# Patient Record
Sex: FEMALE | Race: WHITE | Employment: UNEMPLOYED | URBAN - METROPOLITAN AREA
[De-identification: names, ages, dates, MRNs, and addresses within clinical notes are randomized per-mention and may not be internally consistent; named-entity substitution may affect disease eponyms.]

---

## 2017-04-05 PROBLEM — K21.9 GASTROESOPHAGEAL REFLUX DISEASE: Status: ACTIVE | Noted: 2017-04-05

## 2017-05-26 ENCOUNTER — HOSPITAL ENCOUNTER (OUTPATIENT)
Dept: PHYSICAL THERAPY | Age: 37
Discharge: HOME | End: 2017-05-26
Payer: COMMERCIAL

## 2017-05-26 DIAGNOSIS — M79.7 FIBROMYALGIA: ICD-10-CM

## 2017-05-26 PROCEDURE — 97162 PT EVAL MOD COMPLEX 30 MIN: CPT

## 2017-05-26 NOTE — PROGRESS NOTES
Ambulatory/Rehab Services H2 Model Falls Risk Assessment    Risk Factor Pts. ·   Confusion/Disorientation/Impulsivity  []    4 ·   Symptomatic Depression  []   2 ·   Altered Elimination  []   1 ·   Dizziness/Vertigo  []   1 ·   Gender (Male)  []   1 ·   Any administered antiepileptics (anticonvulsants):  []   2 ·   Any administered benzodiazepines:  []   1 ·   Visual Impairment (specify):  []   1 ·   Portable Oxygen Use  []   1 ·   Orthostatic ? BP  []   1 ·   History of Recent Falls (within 3 mos.)  []   5     Ability to Rise from Chair (choose one) Pts. ·   Ability to rise in a single movement  [x]   0 ·   Pushes up, successful in one attempt  []   1 ·   Multiple attempts, but successful  []   3 ·   Unable to rise without assistance  []   4   Total: (5 or greater = High Risk) 0     Falls Prevention Plan:   []                Physical Limitations to Exercise (specify):   []                Mobility Assistance Device (type):   []                Exercise/Equipment Adaptation (specify):    ©2010 [facility] of [employer] [address] [place] [address] Patent #[id number].  Federal Law prohibits the replication, distribution or use without written permission from [facility] of [address] [place] [address]  5/26/2017

## 2017-05-26 NOTE — PROGRESS NOTES
Dawood Florentino  : 1980 [facility] [place] [address] [address] [facility]  [phone]   [phone]        OUTPATIENT PHYSICAL THERAPY:Initial Assessment 2017    ICD-10: Treatment Diagnosis: fibromyalgia (M79.7)  Difficulty in walking, not elsewhere classified (R26.2)  Precautions/Allergies:   Excedrin p.m. [diphenhydramine-acetaminophen]; Excedrin [acetaminophen-caffeine]; and Pepto-bismol [bismuth subsalicylate]   Fall Risk Score: 0 (? 5 = High Risk)  MD Orders: Evaluate and treat MEDICAL/REFERRING DIAGNOSIS:  Fibromyalgia [M79.7]   DATE OF ONSET: 17  REFERRING PHYSICIAN: Demetria Escobedo, [address]: 17   Patient has attended 1 physical therapy session including initial evaluation. INITIAL ASSESSMENT:  Ms. Ilana Mcdonald presents with severe self reported fibromyalgia symptoms and self reported mild mood disturbances. She presents with severe self reported fatigue and decreased activity tolerance with functional mobility (6 minute walk test). She presents with postural deviations listed in detail below. She would benefit from skilled physical therapy in order to restore prior level of function and prevent future impairment. PROBLEM LIST (Impacting functional limitations):  1. Decreased Strength  2. Decreased ADL/Functional Activities  3. Decreased Transfer Abilities  4. Decreased Ambulation Ability/Technique  5. Decreased Balance  6. Increased Pain  7. Decreased Activity Tolerance  8. Decreased Pacing Skills  9. Decreased Work Simplification/Energy Conservation Techniques  10. Increased Fatigue  11. Decreased Flexibility/Joint Mobility  12. Decreased [place] with Home Exercise Program INTERVENTIONS PLANNED:  1. Balance Exercise  2. Bed Mobility  3. Cold  4. Family Education  5. Gait Training  6. Heat  7. Home Exercise Program (HEP)  8. Manual Therapy  9. Neuromuscular Re-education/Strengthening  10.  Range of Motion (ROM)  11. Therapeutic Activites  12. Therapeutic Exercise/Strengthening  13. Transfer Training  14. aquatic therapy   15. Postural training/ training   TREATMENT PLAN:  Effective Dates: 5/26/17 TO 8/26/17. Frequency/Duration: 2 times a week for 8 weeks  GOALS: (Goals have been discussed and agreed upon with patient.)  DISCHARGE GOALS: Time Frame: 8 weeks  Patient will be independent with home exercise program within 8 weeks in order to improve independence with management of patient's symptoms and/or functional deficits. Patient will improve their fibromyalgia impact scale score to less than 70 points within 8 weeks in order to improve activity tolerance and pain free independence with activities of daily living and functional mobility. Patient will improve their physical subscale score for modified fatigue impact scale to 27 points within 8 weeks in order to improve activity tolerance. Rehabilitation Potential For Stated Goals: Good  Regarding Brenda Peterson therapy, I certify that the treatment plan above will be carried out by a therapist or under their direction. Thank you for this referral,  Donna Bobo, PT     Referring Physician Signature: Guy Rivera, *              Date                    The information in this section was collected on 5/26/17 (except where otherwise noted). HISTORY:   History of Present Injury/Illness (Reason for Referral):  Megan Andre notes she is going to try physical therapy again \"even though I know it won't help. \" She notes she is looking to hire a  due to [employer]" with medical care and work related issues. She notes she has been on short term disability due to fatigue and increased pain. She plans to return to work 5/30/17. She notes fibromyalgia symptoms are worsening.    Past Medical History/Comorbidities: anemia, fibromyalgia, GERD, hypothyroidism, migraine, morbid obesity, oligomenorrhea, ovarian cyst, vitamin D deficiency, gastrectomy  Social History/Living Environment:     Roberta Navarro lives with her teenage daughter, no steps to enter house, one level home  Prior Level of Function/Work/Activity:  Roberta Navarro works full time - plans to return to work 5/30/17. Her job requirements include prolonged sitting, computer, and phone use  Personal Factors:          Sex:  female        Age:  40 y.o. Current Medications:  Trisprintec, lyrica, zomig, cymbalta, metformin, synthroid, iron, valtrex, vitamin D3, multivitamin   Date Last Reviewed:  5/26/2017   Number of Personal Factors/Comorbidities that affect the Plan of Care: 1-2: MODERATE COMPLEXITY   EXAMINATION:   Observation/Orthostatic Postural Assessment:  Assessed 5/26/17 Roberta Navarro sits with forward head and rounded shoulders which indicate tight anterior chest musculature, upper trapezius, and levator scapula and weak posterior scapula musculature and deep cervical flexors.  Roberta Navarro stands with left iliac crest superior to right, left PSIS superior to right, left ASIS inferior to right  Palpation:  Assessed 5/26/17 Roberta Navarro notes tenderness to palpate throughout body during assessment  ROM: Assessed  5/26/17  Selective functional movement assessment  Cervical flexion 75%  Cervical extension 90%  Cervical rotation/sidebend bilaterally 75%  appleys scratch test external rotation T4 - feels good  appleys scratch test internal rotation T7 - pain left anterior shoulder and upper arm   Multisegmental flexion 75% - pain right low back region  Multisegmental extension 75% - doesn't hurt  Multisegmental rotation 75% - pain with right rotation  Single leg stance - at least 3 seconds bilaterally   Strength: Assessed  5/26/17   4/5 all major muscle groups bilateral upper and lower extremities   Special Tests: Assessed 5/26/17  Slump test - negative bilaterally  Straight leg raise- negative bilaterally, tight hamstrings bilaterally   Neurological Screen: Assessed 5/26/17  Myotomes: strong and symmetrical bilateral upper and lower extremities   Dermatomes: tingling noted bilateral hands and feet  Functional Mobility:  Assessed 5/26/17 Areta Clarity notes she requires assistance with carrying groceries, she notes she ambulates with straight cane at times due to increased pain, she notes difficulty completing house chores without significant rest between tasks  Balance:  Assessed 5/26/17 see single leg stance above  Mental Status:  Assessed 5/26/17  Alert and oriented x 4   Body Structures Involved:  1. Nerves  2. Joints  3. Muscles  4. Ligaments Body Functions Affected:  1. Sensory/Pain  2. Neuromusculoskeletal  3. Movement Related  4. Skin Related Activities and Participation Affected:  1. General Tasks and Demands  2. Mobility  3. Self Care  4. Domestic Life  5. Interpersonal Interactions and Relationships  6.  Community, Social and [place]   Number of elements that affect the Plan of Care: 3: MODERATE COMPLEXITY   CLINICAL PRESENTATION:   Presentation: Evolving clinical presentation with changing clinical characteristics: MODERATE COMPLEXITY   CLINICAL DECISION MAKING:   Objective Measures: Assessed 5/26/17    Tool Used: Fibromyalgia Impact Questionnaire  Score:  Initial: 86.73    Interpretation of Score: <40 = Mild Fibromyalgia Syndrome   40-60 = Moderate Fibromyalgia Syndrome   >60-70 = Severe Fibromyalgia Syndrome  Score 0 1-18 19-37 38-57 58-77 78-96 97   Modifier CH CI CJ CK CL CM CN     Tool Used: Thompson Depression Index   Score:  Initial: 13    Interpretation of Score: 0-10 = ups and downs considered normal   11-16= mild mood disturbance   17-20= borderline clinical depression   21-30= moderate depression   31-40= severe depression   Over 40= extreme depression     Tool Used: Modified Fatigue Impact Scale  Score:  Initial: Total score: 68/84  Physical subscale score: 31  Cognitive subscale score: 29  Psychosocial subscale score: 8      Tool Used: 6-MINUTE WALK TEST  Score:  Initial: 420 feet    Interpretation of Score: Normal range varies but is approximately 4347-0106 Feet  Medical Necessity:   · Patient is expected to demonstrate progress in strength, coordination and functional technique to increase independence with pain free functional mobility and activities of daily living. · Patient demonstrates good rehab potential due to higher previous functional level. Reason for Services/Other Comments:  · Patient continues to require skilled intervention due to increased pain with functional mobility and decreased activity tolerance. Use of outcome tool(s) and clinical judgement create a POC that gives a: Questionable prediction of patient's progress: MODERATE COMPLEXITY            TREATMENT:   (In addition to Assessment/Re-Assessment sessions the following treatments were rendered)  Pre-treatment Symptoms/Complaints:  Bernardo Tsai notes 4/10 pain whole body - achy constant pain  Pain: Initial:   Pain Intensity 1: 4  Pain Location 1:  (whole body)      Evaluation only this date    Treatment/Session Assessment:    Response to Treatment:  Bernardo Tsai notes increased pain in low back and legs after 6 minute walk test.   Pain: Post Treatment Session: 6/10  · Compliance with Program/Exercises: Will assess as treatment progresses. · Recommendations/Intent for next treatment session: \"Next visit will focus on advancements to more challenging activities\".   Total Treatment Duration:  PT Patient Time In/Time Out  Time In: 1345  Time Out: ENRIQUE Huynh

## 2017-05-30 ENCOUNTER — APPOINTMENT (OUTPATIENT)
Dept: PHYSICAL THERAPY | Age: 37
End: 2017-05-30
Payer: COMMERCIAL

## 2017-06-05 ENCOUNTER — HOSPITAL ENCOUNTER (OUTPATIENT)
Dept: PHYSICAL THERAPY | Age: 37
Discharge: HOME | End: 2017-06-05
Payer: COMMERCIAL

## 2017-06-05 PROCEDURE — 97110 THERAPEUTIC EXERCISES: CPT

## 2017-06-05 NOTE — PROGRESS NOTES
Jluis Lebron  : 1980 [facility] at [address], [facility] [address] [facility]  [phone]   [phone]        OUTPATIENT PHYSICAL THERAPY:Daily Note f 2017    ICD-10: Treatment Diagnosis: fibromyalgia (M79.7)  Difficulty in walking, not elsewhere classified (R26.2)  Precautions/Allergies:   Excedrin p.m. [diphenhydramine-acetaminophen]; Excedrin [acetaminophen-caffeine]; and Pepto-bismol [bismuth subsalicylate]   Fall Risk Score: 0 (? 5 = High Risk)  MD Orders: Evaluate and treat MEDICAL/REFERRING DIAGNOSIS:  Fibromyalgia [M79.7]   DATE OF ONSET: 17  REFERRING PHYSICIAN: Vonda Mata, [address]: 17   Patient has attended 1 physical therapy session including initial evaluation. INITIAL ASSESSMENT:  Ms. Dipak Ryan presents with severe self reported fibromyalgia symptoms and self reported mild mood disturbances. She presents with severe self reported fatigue and decreased activity tolerance with functional mobility (6 minute walk test). She presents with postural deviations listed in detail below. She would benefit from skilled physical therapy in order to restore prior level of function and prevent future impairment. PROBLEM LIST (Impacting functional limitations):  1. Decreased Strength  2. Decreased ADL/Functional Activities  3. Decreased Transfer Abilities  4. Decreased Ambulation Ability/Technique  5. Decreased Balance  6. Increased Pain  7. Decreased Activity Tolerance  8. Decreased Pacing Skills  9. Decreased Work Simplification/Energy Conservation Techniques  10. Increased Fatigue  11. Decreased Flexibility/Joint Mobility  12. Decreased [place] with Home Exercise Program INTERVENTIONS PLANNED:  1. Balance Exercise  2. Bed Mobility  3. Cold  4. Family Education  5. Gait Training  6. Heat  7. Home Exercise Program (HEP)  8. Manual Therapy  9. Neuromuscular Re-education/Strengthening  10.  Range of Motion (ROM)  11. Therapeutic Activites  12. Therapeutic Exercise/Strengthening  13. Transfer Training  14. aquatic therapy   15. Postural training/ training   TREATMENT PLAN:  Effective Dates: 5/26/17 TO 8/26/17. Frequency/Duration: 2 times a week for 8 weeks  GOALS: (Goals have been discussed and agreed upon with patient.)  DISCHARGE GOALS: Time Frame: 8 weeks  Patient will be independent with home exercise program within 8 weeks in order to improve independence with management of patient's symptoms and/or functional deficits. Patient will improve their fibromyalgia impact scale score to less than 70 points within 8 weeks in order to improve activity tolerance and pain free independence with activities of daily living and functional mobility. Patient will improve their physical subscale score for modified fatigue impact scale to 27 points within 8 weeks in order to improve activity tolerance. Rehabilitation Potential For Stated Goals: Good  Regarding Enedelia Dixon therapy, I certify that the treatment plan above will be carried out by a therapist or under their direction. Thank you for this referral,  Savi Womack PTA     Referring Physician Signature: Romi Baldwin, *              Date                    The information in this section was collected on 5/26/17 (except where otherwise noted). HISTORY:   History of Present Injury/Illness (Reason for Referral):  Kim Stark notes she is going to try physical therapy again \"even though I know it won't help. \" She notes she is looking to hire a  due to [employer]" with medical care and work related issues. She notes she has been on short term disability due to fatigue and increased pain. She plans to return to work 5/30/17. She notes fibromyalgia symptoms are worsening.    Past Medical History/Comorbidities: anemia, fibromyalgia, GERD, hypothyroidism, migraine, morbid obesity, oligomenorrhea, ovarian cyst, vitamin D deficiency, gastrectomy  Social History/Living Environment:     Megan Andre lives with her teenage daughter, no steps to enter house, one level home  Prior Level of Function/Work/Activity:  Megan Andre works full time - plans to return to work 5/30/17. Her job requirements include prolonged sitting, computer, and phone use  Personal Factors:          Sex:  female        Age:  40 y.o. Current Medications:  Trisprintec, lyrica, zomig, cymbalta, metformin, synthroid, iron, valtrex, vitamin D3, multivitamin   Date Last Reviewed:  6/5/2017   Number of Personal Factors/Comorbidities that affect the Plan of Care: 1-2: MODERATE COMPLEXITY   EXAMINATION:   Observation/Orthostatic Postural Assessment:  Assessed 5/26/17 Megan Andre sits with forward head and rounded shoulders which indicate tight anterior chest musculature, upper trapezius, and levator scapula and weak posterior scapula musculature and deep cervical flexors.  Megan Andre stands with left iliac crest superior to right, left PSIS superior to right, left ASIS inferior to right  Palpation:  Assessed 5/26/17 Megan Andre notes tenderness to palpate throughout body during assessment  ROM: Assessed  5/26/17  Selective functional movement assessment  Cervical flexion 75%  Cervical extension 90%  Cervical rotation/sidebend bilaterally 75%  appleys scratch test external rotation T4 - feels good  appleys scratch test internal rotation T7 - pain left anterior shoulder and upper arm   Multisegmental flexion 75% - pain right low back region  Multisegmental extension 75% - doesn't hurt  Multisegmental rotation 75% - pain with right rotation  Single leg stance - at least 3 seconds bilaterally   Strength: Assessed  5/26/17   4/5 all major muscle groups bilateral upper and lower extremities   Special Tests: Assessed 5/26/17  Slump test - negative bilaterally  Straight leg raise- negative bilaterally, tight hamstrings bilaterally   Neurological Screen: Assessed 5/26/17  Myotomes: strong and symmetrical bilateral upper and lower extremities   Dermatomes: tingling noted bilateral hands and feet  Functional Mobility:  Assessed 5/26/17 Kiki Tamayo notes she requires assistance with carrying groceries, she notes she ambulates with straight cane at times due to increased pain, she notes difficulty completing house chores without significant rest between tasks  Balance:  Assessed 5/26/17 see single leg stance above  Mental Status:  Assessed 5/26/17  Alert and oriented x 4   Body Structures Involved:  1. Nerves  2. Joints  3. Muscles  4. Ligaments Body Functions Affected:  1. Sensory/Pain  2. Neuromusculoskeletal  3. Movement Related  4. Skin Related Activities and Participation Affected:  1. General Tasks and Demands  2. Mobility  3. Self Care  4. Domestic Life  5. Interpersonal Interactions and Relationships  6.  Community, Social and [place]   Number of elements that affect the Plan of Care: 3: MODERATE COMPLEXITY   CLINICAL PRESENTATION:   Presentation: Evolving clinical presentation with changing clinical characteristics: MODERATE COMPLEXITY   CLINICAL DECISION MAKING:   Objective Measures: Assessed 5/26/17    Tool Used: Fibromyalgia Impact Questionnaire  Score:  Initial: 86.73    Interpretation of Score: <40 = Mild Fibromyalgia Syndrome   40-60 = Moderate Fibromyalgia Syndrome   >60-70 = Severe Fibromyalgia Syndrome  Score 0 1-18 19-37 38-57 58-77 78-96 97   Modifier CH CI CJ CK CL CM CN     Tool Used: Thompson Depression Index   Score:  Initial: 13    Interpretation of Score: 0-10 = ups and downs considered normal   11-16= mild mood disturbance   17-20= borderline clinical depression   21-30= moderate depression   31-40= severe depression   Over 40= extreme depression     Tool Used: Modified Fatigue Impact Scale  Score:  Initial: Total score: 68/84  Physical subscale score: 31  Cognitive subscale score: 29  Psychosocial subscale score: 8      Tool Used: 6-MINUTE WALK TEST  Score:  Initial: 420 feet    Interpretation of Score: Normal range varies but is approximately 2571-0302 Feet  Medical Necessity:   · Patient is expected to demonstrate progress in strength, coordination and functional technique to increase independence with pain free functional mobility and activities of daily living. · Patient demonstrates good rehab potential due to higher previous functional level. Reason for Services/Other Comments:  · Patient continues to require skilled intervention due to increased pain with functional mobility and decreased activity tolerance. Use of outcome tool(s) and clinical judgement create a POC that gives a: Questionable prediction of patient's progress: MODERATE COMPLEXITY            TREATMENT:   (In addition to Assessment/Re-Assessment sessions the following treatments were rendered)  Pre-treatment Symptoms/Complaints:  Roberta Navarro notes pain was 4.5/10. She reports she passed out Saturday afternoon, and took it easy on Sunday. .  Pain: Initial: 4.5/10           THERAPEUTIC EXERCISE: (45 minutes):  Exercises per grid below to improve mobility, strength and balance. Date:  6-5-17 Date:   Date:     Activity/Exercise Parameters Parameters Parameters   Nustep  Level 2  10 minutes     Supine - hamstring stretch With strap   3 x 30 sec B     Piriformis stretch  3 x 30 sec B     Pelvic tilt  10 x 5 sec      Lower trunk rotation  5 x 10 sec B     Knee to chest 3 x 30 sec                   Treatment/Session Assessment:  Patient was instructed and given handouts for the above exercises for a HEP. She did require verbal and tactile cues to correct a few positions. She had just a slight increase with pain upon departure. She did report feeling tired, but no fatigue noted. Response to Treatment:  Roberta Navarro seems open with treatment today. No difficulties noted. Pain: Post Treatment Session: 5.5/10  · Compliance with Program/Exercises:  Will assess as treatment progresses. · Recommendations/Intent for next treatment session: \"Next visit will focus on advancements to more challenging activities\".   Total Treatment Duration: 45 minutes   PT Patient Time In/Time Out  Time In: 3245  Time Out: [address], PTA

## 2017-06-08 ENCOUNTER — APPOINTMENT (OUTPATIENT)
Dept: PHYSICAL THERAPY | Age: 37
End: 2017-06-08
Payer: COMMERCIAL

## 2017-06-08 ENCOUNTER — HOSPITAL ENCOUNTER (OUTPATIENT)
Dept: PHYSICAL THERAPY | Age: 37
Discharge: HOME | End: 2017-06-08
Payer: COMMERCIAL

## 2017-06-08 PROCEDURE — 97113 AQUATIC THERAPY/EXERCISES: CPT

## 2017-06-08 NOTE — PROGRESS NOTES
James Long  : 1980 [facility] at [address], [facility]  [phone]   [id number]        OUTPATIENT PHYSICAL THERAPY:Daily Note and Aquatic Note 2017    ICD-10: Treatment Diagnosis: fibromyalgia (M79.7)  Difficulty in walking, not elsewhere classified (R26.2)  Precautions/Allergies:   Excedrin p.m. [diphenhydramine-acetaminophen]; Excedrin [acetaminophen-caffeine]; and Pepto-bismol [bismuth subsalicylate]   Fall Risk Score: 0 (? 5 = High Risk)  MD Orders: Evaluate and treat MEDICAL/REFERRING DIAGNOSIS:  Fibromyalgia [M79.7]   DATE OF ONSET: 17  REFERRING PHYSICIAN: Christiano Chavira, [address]: 17   Patient has attended 1 physical therapy session including initial evaluation. INITIAL ASSESSMENT:  Ms. Latisha Mehta presents with severe self reported fibromyalgia symptoms and self reported mild mood disturbances. She presents with severe self reported fatigue and decreased activity tolerance with functional mobility (6 minute walk test). She presents with postural deviations listed in detail below. She would benefit from skilled physical therapy in order to restore prior level of function and prevent future impairment. PROBLEM LIST (Impacting functional limitations):  1. Decreased Strength  2. Decreased ADL/Functional Activities  3. Decreased Transfer Abilities  4. Decreased Ambulation Ability/Technique  5. Decreased Balance  6. Increased Pain  7. Decreased Activity Tolerance  8. Decreased Pacing Skills  9. Decreased Work Simplification/Energy Conservation Techniques  10. Increased Fatigue  11. Decreased Flexibility/Joint Mobility  12. Decreased [place] with Home Exercise Program INTERVENTIONS PLANNED:  1. Balance Exercise  2. Bed Mobility  3. Cold  4. Family Education  5. Gait Training  6. Heat  7. Home Exercise Program (HEP)  8. Manual Therapy  9. Neuromuscular Re-education/Strengthening  10.  Range of Motion (ROM)  11. Therapeutic Activites  12. Therapeutic Exercise/Strengthening  13. Transfer Training  14. aquatic therapy   15. Postural training/ training   TREATMENT PLAN:  Effective Dates: 5/26/17 TO 8/26/17. Frequency/Duration: 2 times a week for 8 weeks  GOALS: (Goals have been discussed and agreed upon with patient.)  DISCHARGE GOALS: Time Frame: 8 weeks  Patient will be independent with home exercise program within 8 weeks in order to improve independence with management of patient's symptoms and/or functional deficits. Patient will improve their fibromyalgia impact scale score to less than 70 points within 8 weeks in order to improve activity tolerance and pain free independence with activities of daily living and functional mobility. Patient will improve their physical subscale score for modified fatigue impact scale to 27 points within 8 weeks in order to improve activity tolerance. Rehabilitation Potential For Stated Goals: Good  Regarding Man [facility]ings[place] therapy, I certify that the treatment plan above will be carried out by a therapist or under their direction. Thank you for this referral,  Aarti Almanza PTA     Referring Physician Signature: Saeid Santoyo, *              Date                    The information in this section was collected on 5/26/17 (except where otherwise noted). HISTORY:   History of Present Injury/Illness (Reason for Referral):  Gracia Ramírez notes she is going to try physical therapy again \"even though I know it won't help. \" She notes she is looking to hire a  due to [employer]" with medical care and work related issues. She notes she has been on short term disability due to fatigue and increased pain. She plans to return to work 5/30/17. She notes fibromyalgia symptoms are worsening.    Past Medical History/Comorbidities: anemia, fibromyalgia, GERD, hypothyroidism, migraine, morbid obesity, oligomenorrhea, ovarian cyst, vitamin D deficiency, gastrectomy  Social History/Living Environment:     Bulmaro Rahman lives with her teenage daughter, no steps to enter house, one level home  Prior Level of Function/Work/Activity:  Bulmaro Rahman works full time - plans to return to work 5/30/17. Her job requirements include prolonged sitting, computer, and phone use  Personal Factors:          Sex:  female        Age:  40 y.o. Current Medications:  Trisprintec, lyrica, zomig, cymbalta, metformin, synthroid, iron, valtrex, vitamin D3, multivitamin   Date Last Reviewed:  6/8/2017   Number of Personal Factors/Comorbidities that affect the Plan of Care: 1-2: MODERATE COMPLEXITY   EXAMINATION:   Observation/Orthostatic Postural Assessment:  Assessed 5/26/17 Bulmaro Rahman sits with forward head and rounded shoulders which indicate tight anterior chest musculature, upper trapezius, and levator scapula and weak posterior scapula musculature and deep cervical flexors.  Bulmaro Rahman stands with left iliac crest superior to right, left PSIS superior to right, left ASIS inferior to right  Palpation:  Assessed 5/26/17 Bulmaro Rahman notes tenderness to palpate throughout body during assessment  ROM: Assessed  5/26/17  Selective functional movement assessment  Cervical flexion 75%  Cervical extension 90%  Cervical rotation/sidebend bilaterally 75%  appleys scratch test external rotation T4 - feels good  appleys scratch test internal rotation T7 - pain left anterior shoulder and upper arm   Multisegmental flexion 75% - pain right low back region  Multisegmental extension 75% - doesn't hurt  Multisegmental rotation 75% - pain with right rotation  Single leg stance - at least 3 seconds bilaterally   Strength: Assessed  5/26/17   4/5 all major muscle groups bilateral upper and lower extremities   Special Tests: Assessed 5/26/17  Slump test - negative bilaterally  Straight leg raise- negative bilaterally, tight hamstrings bilaterally   Neurological Screen: Assessed 5/26/17  Myotomes: strong and symmetrical bilateral upper and lower extremities   Dermatomes: tingling noted bilateral hands and feet  Functional Mobility:  Assessed 5/26/17 Traci Moreno notes she requires assistance with carrying groceries, she notes she ambulates with straight cane at times due to increased pain, she notes difficulty completing house chores without significant rest between tasks  Balance:  Assessed 5/26/17 see single leg stance above  Mental Status:  Assessed 5/26/17  Alert and oriented x 4   Body Structures Involved:  1. Nerves  2. Joints  3. Muscles  4. Ligaments Body Functions Affected:  1. Sensory/Pain  2. Neuromusculoskeletal  3. Movement Related  4. Skin Related Activities and Participation Affected:  1. General Tasks and Demands  2. Mobility  3. Self Care  4. Domestic Life  5. Interpersonal Interactions and Relationships  6.  Community, Social and [place]   Number of elements that affect the Plan of Care: 3: MODERATE COMPLEXITY   CLINICAL PRESENTATION:   Presentation: Evolving clinical presentation with changing clinical characteristics: MODERATE COMPLEXITY   CLINICAL DECISION MAKING:   Objective Measures: Assessed 5/26/17    Tool Used: Fibromyalgia Impact Questionnaire  Score:  Initial: 86.73    Interpretation of Score: <40 = Mild Fibromyalgia Syndrome   40-60 = Moderate Fibromyalgia Syndrome   >60-70 = Severe Fibromyalgia Syndrome  Score 0 1-18 19-37 38-57 58-77 78-96 97   Modifier CH CI CJ CK CL CM CN     Tool Used: Thompson Depression Index   Score:  Initial: 13    Interpretation of Score: 0-10 = ups and downs considered normal   11-16= mild mood disturbance   17-20= borderline clinical depression   21-30= moderate depression   31-40= severe depression   Over 40= extreme depression     Tool Used: Modified Fatigue Impact Scale  Score:  Initial: Total score: 68/84  Physical subscale score: 31  Cognitive subscale score: 29  Psychosocial subscale score: 8      Tool Used: 6-MINUTE WALK TEST  Score:  Initial: 420 feet    Interpretation of Score: Normal range varies but is approximately 4297-7158 Feet  Medical Necessity:   · Patient is expected to demonstrate progress in strength, coordination and functional technique to increase independence with pain free functional mobility and activities of daily living. · Patient demonstrates good rehab potential due to higher previous functional level. Reason for Services/Other Comments:  · Patient continues to require skilled intervention due to increased pain with functional mobility and decreased activity tolerance. Use of outcome tool(s) and clinical judgement create a POC that gives a: Questionable prediction of patient's progress: MODERATE COMPLEXITY            TREATMENT:     Aquatic Therapy (45 minutes): Aquatic treatment performed per flow grid for Decreased muscle strength, Decreased endurance and Decreased activity endurance.       Aquatic Exercise Log       Date  6-8-17 Date   Date   Date   Date     Activity/ Exercise Parameters Parameters Parameters Parameters Parameters   Walking forward 2 laps       Walking backward 2 laps       Walking sideways 2 laps         Marching 2 laps         Goose Step 2 laps         Tip toes          Heels          Lunges        Side step squats        LE Exercises noodle         Hip Flex/Ext Marching x 15 B         Hip Abd/Add X 15 B         Hip IR/ER          Calf raises          Knee Flex          Squats          Leg Circles Deeper water x 10 each way each leg         Step Ups        UE Exercises noodle         Squeeze In X 15 B         Push Down X 15 B         Pull Down X 15 B         Bicep/Tricep        Rows/Press outs         Chi Positions          Trunk Rotation        Deep H2O/ Noodles noodle         Stabilization          Arms only Straddle - 2 laps         Legs only Straddle - 2 laps  Both - 2 laps       BarkBox X 20          Scissors X 20          Crab walk 2 laps       Lower abdominal work  Knees to chest x 10          Cardio          Jogging        Lap   Swimming          Stretches [x]  In [place]  []  Whirlpool* []  In [place]  []  Whirlpool* []  In [place]  []  Whirlpool* []  In Pool  []  Whirlpool* []  In Pool  []  Whirlpool*     Hamstrings X 3 B self         Heelcords X 3 B self         Piriformis X 3 B self         Neck                  * For increased soft tissue extensibility a warm water (over 100°F) environment was utilized. Supervision/monitoring was provided at all times. Treatment/Session Assessment:  Patient did well with her first aquatic session. She reports she is trying hard. Patient reports feeling tired, but no fatigue noted. Canary Malady Response to Treatment:  Elia Tavera seems open with treatment today. No difficulties noted. Pain: Post Treatment Session: 5.5/10  · Compliance with Program/Exercises: Will assess as treatment progresses. · Recommendations/Intent for next treatment session: \"Next visit will focus on advancements to more challenging activities\".   Total Treatment Duration: 45 minutes   PT Patient Time In/Time Out  Time In: 1315  Time Out: 179 [facility]

## 2017-06-12 ENCOUNTER — HOSPITAL ENCOUNTER (OUTPATIENT)
Dept: PHYSICAL THERAPY | Age: 37
Discharge: HOME | End: 2017-06-12
Payer: COMMERCIAL

## 2017-06-12 PROCEDURE — 97110 THERAPEUTIC EXERCISES: CPT

## 2017-06-12 NOTE — PROGRESS NOTES
Roberta Navarro  : 1980 [facility] Center at [address], [facility]  [phone]   [phone]        OUTPATIENT PHYSICAL THERAPY:Daily Note f 2017    ICD-10: Treatment Diagnosis: fibromyalgia (M79.7)  Difficulty in walking, not elsewhere classified (R26.2)  Precautions/Allergies:   Excedrin p.m. [diphenhydramine-acetaminophen]; Excedrin [acetaminophen-caffeine]; and Pepto-bismol [bismuth subsalicylate]   Fall Risk Score: 0 (? 5 = High Risk)  MD Orders: Evaluate and treat MEDICAL/REFERRING DIAGNOSIS:  Fibromyalgia [M79.7]   DATE OF ONSET: 17  REFERRING PHYSICIAN: Jay Williamson, [address]: 17   Patient has attended 1 physical therapy session including initial evaluation. INITIAL ASSESSMENT:  Ms. Velvet Olivarez presents with severe self reported fibromyalgia symptoms and self reported mild mood disturbances. She presents with severe self reported fatigue and decreased activity tolerance with functional mobility (6 minute walk test). She presents with postural deviations listed in detail below. She would benefit from skilled physical therapy in order to restore prior level of function and prevent future impairment. PROBLEM LIST (Impacting functional limitations):  1. Decreased Strength  2. Decreased ADL/Functional Activities  3. Decreased Transfer Abilities  4. Decreased Ambulation Ability/Technique  5. Decreased Balance  6. Increased Pain  7. Decreased Activity Tolerance  8. Decreased Pacing Skills  9. Decreased Work Simplification/Energy Conservation Techniques  10. Increased Fatigue  11. Decreased Flexibility/Joint Mobility  12. Decreased [place] with Home Exercise Program INTERVENTIONS PLANNED:  1. Balance Exercise  2. Bed Mobility  3. Cold  4. Family Education  5. Gait Training  6. Heat  7. Home Exercise Program (HEP)  8. Manual Therapy  9. Neuromuscular Re-education/Strengthening  10.  Range of Motion (ROM)  11. Therapeutic Activites  12. Therapeutic Exercise/Strengthening  13. Transfer Training  14. aquatic therapy   15. Postural training/ training   TREATMENT PLAN:  Effective Dates: 5/26/17 TO 8/26/17. Frequency/Duration: 2 times a week for 8 weeks  GOALS: (Goals have been discussed and agreed upon with patient.)  DISCHARGE GOALS: Time Frame: 8 weeks  Patient will be independent with home exercise program within 8 weeks in order to improve independence with management of patient's symptoms and/or functional deficits. Patient will improve their fibromyalgia impact scale score to less than 70 points within 8 weeks in order to improve activity tolerance and pain free independence with activities of daily living and functional mobility. Patient will improve their physical subscale score for modified fatigue impact scale to 27 points within 8 weeks in order to improve activity tolerance. Rehabilitation Potential For Stated Goals: Good  Regarding Shayla Christopher therapy, I certify that the treatment plan above will be carried out by a therapist or under their direction. Thank you for this referral,  Lexi Mohr PTA     Referring Physician Signature: Rajeev Arguello, *              Date                    The information in this section was collected on 5/26/17 (except where otherwise noted). HISTORY:   History of Present Injury/Illness (Reason for Referral):  Traci Moreno notes she is going to try physical therapy again \"even though I know it won't help. \" She notes she is looking to hire a  due to [employer]" with medical care and work related issues. She notes she has been on short term disability due to fatigue and increased pain. She plans to return to work 5/30/17. She notes fibromyalgia symptoms are worsening.    Past Medical History/Comorbidities: anemia, fibromyalgia, GERD, hypothyroidism, migraine, morbid obesity, oligomenorrhea, ovarian cyst, vitamin D deficiency, gastrectomy  Social History/Living Environment:     Marlyn Tang lives with her teenage daughter, no steps to enter house, one level home  Prior Level of Function/Work/Activity:  Marlyn Tang works full time - plans to return to work 5/30/17. Her job requirements include prolonged sitting, computer, and phone use  Personal Factors:          Sex:  female        Age:  40 y.o. Current Medications:  Trisprintec, lyrica, zomig, cymbalta, metformin, synthroid, iron, valtrex, vitamin D3, multivitamin   Date Last Reviewed:  6/12/2017   Number of Personal Factors/Comorbidities that affect the Plan of Care: 1-2: MODERATE COMPLEXITY   EXAMINATION:   Observation/Orthostatic Postural Assessment:  Assessed 5/26/17 Marlyn Tang sits with forward head and rounded shoulders which indicate tight anterior chest musculature, upper trapezius, and levator scapula and weak posterior scapula musculature and deep cervical flexors.  Marlyn Tang stands with left iliac crest superior to right, left PSIS superior to right, left ASIS inferior to right  Palpation:  Assessed 5/26/17 Marlyn Tang notes tenderness to palpate throughout body during assessment  ROM: Assessed  5/26/17  Selective functional movement assessment  Cervical flexion 75%  Cervical extension 90%  Cervical rotation/sidebend bilaterally 75%  appleys scratch test external rotation T4 - feels good  appleys scratch test internal rotation T7 - pain left anterior shoulder and upper arm   Multisegmental flexion 75% - pain right low back region  Multisegmental extension 75% - doesn't hurt  Multisegmental rotation 75% - pain with right rotation  Single leg stance - at least 3 seconds bilaterally   Strength: Assessed  5/26/17   4/5 all major muscle groups bilateral upper and lower extremities   Special Tests: Assessed 5/26/17  Slump test - negative bilaterally  Straight leg raise- negative bilaterally, tight hamstrings bilaterally   Neurological Screen: Assessed 5/26/17  Myotomes: strong and symmetrical bilateral upper and lower extremities   Dermatomes: tingling noted bilateral hands and feet  Functional Mobility:  Assessed 5/26/17 Tiesha Wilcox notes she requires assistance with carrying groceries, she notes she ambulates with straight cane at times due to increased pain, she notes difficulty completing house chores without significant rest between tasks  Balance:  Assessed 5/26/17 see single leg stance above  Mental Status:  Assessed 5/26/17  Alert and oriented x 4   Body Structures Involved:  1. Nerves  2. Joints  3. Muscles  4. Ligaments Body Functions Affected:  1. Sensory/Pain  2. Neuromusculoskeletal  3. Movement Related  4. Skin Related Activities and Participation Affected:  1. General Tasks and Demands  2. Mobility  3. Self Care  4. Domestic Life  5. Interpersonal Interactions and Relationships  6.  Community, Social and [place]   Number of elements that affect the Plan of Care: 3: MODERATE COMPLEXITY   CLINICAL PRESENTATION:   Presentation: Evolving clinical presentation with changing clinical characteristics: MODERATE COMPLEXITY   CLINICAL DECISION MAKING:   Objective Measures: Assessed 5/26/17    Tool Used: Fibromyalgia Impact Questionnaire  Score:  Initial: 86.73    Interpretation of Score: <40 = Mild Fibromyalgia Syndrome   40-60 = Moderate Fibromyalgia Syndrome   >60-70 = Severe Fibromyalgia Syndrome  Score 0 1-18 19-37 38-57 58-77 78-96 97   Modifier CH CI CJ CK CL CM CN     Tool Used: Thompson Depression Index   Score:  Initial: 13    Interpretation of Score: 0-10 = ups and downs considered normal   11-16= mild mood disturbance   17-20= borderline clinical depression   21-30= moderate depression   31-40= severe depression   Over 40= extreme depression     Tool Used: Modified Fatigue Impact Scale  Score:  Initial: Total score: 68/84  Physical subscale score: 31  Cognitive subscale score: 29  Psychosocial subscale score: 8      Tool Used: 6-MINUTE WALK TEST  Score:  Initial: 420 feet    Interpretation of Score: Normal range varies but is approximately 3010-3851 Feet  Medical Necessity:   · Patient is expected to demonstrate progress in strength, coordination and functional technique to increase independence with pain free functional mobility and activities of daily living. · Patient demonstrates good rehab potential due to higher previous functional level. Reason for Services/Other Comments:  · Patient continues to require skilled intervention due to increased pain with functional mobility and decreased activity tolerance. Use of outcome tool(s) and clinical judgement create a POC that gives a: Questionable prediction of patient's progress: MODERATE COMPLEXITY            TREATMENT:   (In addition to Assessment/Re-Assessment sessions the following treatments were rendered)  Pre-treatment Symptoms/Complaints:  Tamara Grayson notes pain was 7.5/10. She reports that this weekend and today have been bad. She reports extra stress in her life right now. She presents with straight cane today. Pain: Initial: 7.5/10           THERAPEUTIC EXERCISE: (45 minutes):  Exercises per grid below to improve mobility, strength and balance. Date:  6-5-17 Date:  6-12-17 Date:     Activity/Exercise Parameters Parameters Parameters   Nustep  Level 2  10 minutes Level 2  10 minutes     Supine - hamstring stretch With strap   3 x 30 sec B With strap   3 x 30 sec B    Piriformis stretch  3 x 30 sec B 3 x 30 sec B    Pelvic tilt  10 x 5 sec  10 x 5 sec hold     Lower trunk rotation  5 x 10 sec B 5 x 10 sec B     Knee to chest 3 x 30 sec      Straight leg raise  2 x 10 B    Side lying -   Clams   Hip abduction     X 10 B  X 10 B    rows  Yellow   x 10 B    Shoulder extension  Yellow   X 10 B           Treatment/Session Assessment:  Patient did well with the exercises and the new ones, but did require a few rest breaks due to fatigue.  She had just a slight increase with pain upon departure. She reports pain to be 8/10. Response to Treatment:  Tamara Grayson seems open with treatment today. No difficulties noted. Red thera-band given for home use. Pain: Post Treatment Session: 8/10  · Compliance with Program/Exercises: Will assess as treatment progresses. · Recommendations/Intent for next treatment session: \"Next visit will focus on advancements to more challenging activities\".   Total Treatment Duration: 45 minutes   PT Patient Time In/Time Out  Time In: 1430  Time Out: 1800 [facility]

## 2017-06-15 ENCOUNTER — APPOINTMENT (OUTPATIENT)
Dept: PHYSICAL THERAPY | Age: 37
End: 2017-06-15
Payer: COMMERCIAL

## 2017-06-15 ENCOUNTER — HOSPITAL ENCOUNTER (OUTPATIENT)
Dept: PHYSICAL THERAPY | Age: 37
Discharge: HOME | End: 2017-06-15
Payer: COMMERCIAL

## 2017-06-15 PROCEDURE — 97113 AQUATIC THERAPY/EXERCISES: CPT

## 2017-06-15 NOTE — PROGRESS NOTES
Bernardo Tsai  : 1980 [facility] at [address], [place], [address] [facility]  [phone]   [phone]        OUTPATIENT PHYSICAL THERAPY:Daily Note and Aquatic Note 6/15/2017    ICD-10: Treatment Diagnosis: fibromyalgia (M79.7)  Difficulty in walking, not elsewhere classified (R26.2)  Precautions/Allergies:   Excedrin p.m. [diphenhydramine-acetaminophen]; Excedrin [acetaminophen-caffeine]; and Pepto-bismol [bismuth subsalicylate]   Fall Risk Score: 0 (? 5 = High Risk)  MD Orders: Evaluate and treat MEDICAL/REFERRING DIAGNOSIS:  Fibromyalgia [M79.7]   DATE OF ONSET: 17  REFERRING PHYSICIAN: Carlos Bennett [address]: 17   Patient has attended 1 physical therapy session including initial evaluation. INITIAL ASSESSMENT:  Ms. So Elliott presents with severe self reported fibromyalgia symptoms and self reported mild mood disturbances. She presents with severe self reported fatigue and decreased activity tolerance with functional mobility (6 minute walk test). She presents with postural deviations listed in detail below. She would benefit from skilled physical therapy in order to restore prior level of function and prevent future impairment. PROBLEM LIST (Impacting functional limitations):  1. Decreased Strength  2. Decreased ADL/Functional Activities  3. Decreased Transfer Abilities  4. Decreased Ambulation Ability/Technique  5. Decreased Balance  6. Increased Pain  7. Decreased Activity Tolerance  8. Decreased Pacing Skills  9. Decreased Work Simplification/Energy Conservation Techniques  10. Increased Fatigue  11. Decreased Flexibility/Joint Mobility  12. Decreased [place] with Home Exercise Program INTERVENTIONS PLANNED:  1. Balance Exercise  2. Bed Mobility  3. Cold  4. Family Education  5. Gait Training  6. Heat  7. Home Exercise Program (HEP)  8. Manual Therapy  9.  Neuromuscular Re-education/Strengthening  10. Range of Motion (ROM)  11. Therapeutic Activites  12. Therapeutic Exercise/Strengthening  13. Transfer Training  14. aquatic therapy   15. Postural training/ training   TREATMENT PLAN:  Effective Dates: 5/26/17 TO 8/26/17. Frequency/Duration: 2 times a week for 8 weeks  GOALS: (Goals have been discussed and agreed upon with patient.)  DISCHARGE GOALS: Time Frame: 8 weeks  Patient will be independent with home exercise program within 8 weeks in order to improve independence with management of patient's symptoms and/or functional deficits. Patient will improve their fibromyalgia impact scale score to less than 70 points within 8 weeks in order to improve activity tolerance and pain free independence with activities of daily living and functional mobility. Patient will improve their physical subscale score for modified fatigue impact scale to 27 points within 8 weeks in order to improve activity tolerance. Rehabilitation Potential For Stated Goals: Good  Regarding [place] therapy, I certify that the treatment plan above will be carried out by a therapist or under their direction. Thank you for this referral,  Jay Galeas PTA     Referring Physician Signature: Sindhu Waller, *              Date                    The information in this section was collected on 5/26/17 (except where otherwise noted). HISTORY:   History of Present Injury/Illness (Reason for Referral):  Salomón Lo notes she is going to try physical therapy again \"even though I know it won't help. \" She notes she is looking to hire a  due to [employer]" with medical care and work related issues. She notes she has been on short term disability due to fatigue and increased pain. She plans to return to work 5/30/17. She notes fibromyalgia symptoms are worsening.    Past Medical History/Comorbidities: anemia, fibromyalgia, GERD, hypothyroidism, migraine, morbid obesity, oligomenorrhea, ovarian cyst, vitamin D deficiency, gastrectomy  Social History/Living Environment:     Ra Torres lives with her teenage daughter, no steps to enter house, one level home  Prior Level of Function/Work/Activity:  Ra Torres works full time - plans to return to work 5/30/17. Her job requirements include prolonged sitting, computer, and phone use  Personal Factors:          Sex:  female        Age:  40 y.o. Current Medications:  Trisprintec, lyrica, zomig, cymbalta, metformin, synthroid, iron, valtrex, vitamin D3, multivitamin   Date Last Reviewed:  6/15/2017   Number of Personal Factors/Comorbidities that affect the Plan of Care: 1-2: MODERATE COMPLEXITY   EXAMINATION:   Observation/Orthostatic Postural Assessment:  Assessed 5/26/17 Ra Torres sits with forward head and rounded shoulders which indicate tight anterior chest musculature, upper trapezius, and levator scapula and weak posterior scapula musculature and deep cervical flexors.  Ra Torres stands with left iliac crest superior to right, left PSIS superior to right, left ASIS inferior to right  Palpation:  Assessed 5/26/17 Ra Torres notes tenderness to palpate throughout body during assessment  ROM: Assessed  5/26/17  Selective functional movement assessment  Cervical flexion 75%  Cervical extension 90%  Cervical rotation/sidebend bilaterally 75%  appleys scratch test external rotation T4 - feels good  appleys scratch test internal rotation T7 - pain left anterior shoulder and upper arm   Multisegmental flexion 75% - pain right low back region  Multisegmental extension 75% - doesn't hurt  Multisegmental rotation 75% - pain with right rotation  Single leg stance - at least 3 seconds bilaterally   Strength: Assessed  5/26/17   4/5 all major muscle groups bilateral upper and lower extremities   Special Tests: Assessed 5/26/17  Slump test - negative bilaterally  Straight leg raise- negative bilaterally, tight hamstrings bilaterally   Neurological Screen: Assessed 5/26/17  Myotomes: strong and symmetrical bilateral upper and lower extremities   Dermatomes: tingling noted bilateral hands and feet  Functional Mobility:  Assessed 5/26/17 Traci Moreno notes she requires assistance with carrying groceries, she notes she ambulates with straight cane at times due to increased pain, she notes difficulty completing house chores without significant rest between tasks  Balance:  Assessed 5/26/17 see single leg stance above  Mental Status:  Assessed 5/26/17  Alert and oriented x 4   Body Structures Involved:  1. Nerves  2. Joints  3. Muscles  4. Ligaments Body Functions Affected:  1. Sensory/Pain  2. Neuromusculoskeletal  3. Movement Related  4. Skin Related Activities and Participation Affected:  1. General Tasks and Demands  2. Mobility  3. Self Care  4. Domestic Life  5. Interpersonal Interactions and Relationships  6.  Community, Social and [place]   Number of elements that affect the Plan of Care: 3: MODERATE COMPLEXITY   CLINICAL PRESENTATION:   Presentation: Evolving clinical presentation with changing clinical characteristics: MODERATE COMPLEXITY   CLINICAL DECISION MAKING:   Objective Measures: Assessed 5/26/17    Tool Used: Fibromyalgia Impact Questionnaire  Score:  Initial: 86.73    Interpretation of Score: <40 = Mild Fibromyalgia Syndrome   40-60 = Moderate Fibromyalgia Syndrome   >60-70 = Severe Fibromyalgia Syndrome  Score 0 1-18 19-37 38-57 58-77 78-96 97   Modifier CH CI CJ CK CL CM CN     Tool Used: Thompson Depression Index   Score:  Initial: 13    Interpretation of Score: 0-10 = ups and downs considered normal   11-16= mild mood disturbance   17-20= borderline clinical depression   21-30= moderate depression   31-40= severe depression   Over 40= extreme depression     Tool Used: Modified Fatigue Impact Scale  Score:  Initial: Total score: 68/84  Physical subscale score: 31  Cognitive subscale score: 29  Psychosocial subscale score: 8      Tool Used: 6-MINUTE WALK TEST  Score:  Initial: 420 feet    Interpretation of Score: Normal range varies but is approximately 7470-5088 Feet  Medical Necessity:   · Patient is expected to demonstrate progress in strength, coordination and functional technique to increase independence with pain free functional mobility and activities of daily living. · Patient demonstrates good rehab potential due to higher previous functional level. Reason for Services/Other Comments:  · Patient continues to require skilled intervention due to increased pain with functional mobility and decreased activity tolerance. Use of outcome tool(s) and clinical judgement create a POC that gives a: Questionable prediction of patient's progress: MODERATE COMPLEXITY            TREATMENT:     Aquatic Therapy (45 minutes): Aquatic treatment performed per flow grid for Decreased muscle strength, Decreased endurance and Decreased activity endurance.       Aquatic Exercise Log       Date  6-8-17 Date  6-15-17 Date   Date   Date     Activity/ Exercise Parameters Parameters Parameters Parameters Parameters   Walking forward 2 laps 2 laps      Walking backward 2 laps 2 laps      Walking sideways 2 laps 2 laps        Marching 2 laps 2 laps        Goose Step 2 laps 2 laps        Tip toes  2 laps        Heels  2 laps        Lunges        Side step squats  2 laps      LE Exercises noodle noodle        Hip Flex/Ext Marching x 15 B Marching x 20 B        Hip Abd/Add X 15 B X 20 B        Hip IR/ER          Calf raises          Knee Flex          Squats          Leg Circles Deeper water x 10 each way each leg Deeper water x 10 each way each leg        Step Ups        UE Exercises noodle noodle        Squeeze In X 15 B X 20 B        Push Down X 15 B X 20 B        Pull Down X 15 B         Bicep/Tricep        Rows/Press outs  Push ups x 20        Chi Positions          Trunk Rotation        Deep H2O/ Noodles noodle noodle        Stabilization          Arms only Straddle - 2 laps Straddle - 2 laps        Legs only Straddle - 2 laps  Both - 2 laps Straddle - 2 laps  Both - 2 laps      Cross   [place] X 20  X 20         Scissors X 20  X 20   Combo x 20         Crab walk 2 laps 2 laps      Lower abdominal   work  Knees to chest x 10          Cardio          Jogging  Punching noodles down at sides while jogging - 3 minutes       Lap   Swimming          Stretches [x]  In [place]  []  Whirlpool* [x]  In [place]  []  Whirlpool* []  In Pool  []  Whirlpool* []  In Pool  []  Whirlpool* []  In Pool  []  Whirlpool*     Hamstrings X 3 B self X 3 B         Heelcords X 3 B self X 3 B        Piriformis X 3 B self X 3 B        Neck                  * For increased soft tissue extensibility a warm water (over 100°F) environment was utilized. Supervision/monitoring was provided at all times. Pre-treatment pain - 6/10  Treatment/Session Assessment:  Patient doing well with increase in reps and additional activities. She reports she feels her endurance is improving some days. No fatigue noted. Lis Acosta Response to Treatment:  Marlyn Tang seems open with treatment today. No difficulties noted. Pain: Post Treatment Session: 5.5/10  · Compliance with Program/Exercises: Will assess as treatment progresses. · Recommendations/Intent for next treatment session: \"Next visit will focus on advancements to more challenging activities\".   Total Treatment Duration: 45 minutes   PT Patient Time In/Time Out  Time In: 0885  Time Out: [facility] FRANK song

## 2017-06-19 ENCOUNTER — HOSPITAL ENCOUNTER (OUTPATIENT)
Dept: PHYSICAL THERAPY | Age: 37
Discharge: HOME | End: 2017-06-19
Payer: COMMERCIAL

## 2017-06-19 PROCEDURE — 97110 THERAPEUTIC EXERCISES: CPT

## 2017-06-19 NOTE — PROGRESS NOTES
Areta Clarity  : 1980 [facility] at [place] [address] [address] [facility]  [phone]   [phone]        OUTPATIENT PHYSICAL THERAPY:Daily Note f 2017    ICD-10: Treatment Diagnosis: fibromyalgia (M79.7)  Difficulty in walking, not elsewhere classified (R26.2)  Precautions/Allergies:   Excedrin p.m. [diphenhydramine-acetaminophen]; Excedrin [acetaminophen-caffeine]; and Pepto-bismol [bismuth subsalicylate]   Fall Risk Score: 0 (? 5 = High Risk)  MD Orders: Evaluate and treat MEDICAL/REFERRING DIAGNOSIS:  Fibromyalgia [M79.7]   DATE OF ONSET: 17  REFERRING PHYSICIAN: Efrain Pereyra, [address]: 17   Patient has attended 6 physical therapy sessions including initial evaluation. INITIAL ASSESSMENT:  Ms. Jalen Cabezas presents with severe self reported fibromyalgia symptoms and self reported mild mood disturbances. She presents with severe self reported fatigue and decreased activity tolerance with functional mobility (6 minute walk test). She presents with postural deviations listed in detail below. She would benefit from skilled physical therapy in order to restore prior level of function and prevent future impairment. PROBLEM LIST (Impacting functional limitations):  1. Decreased Strength  2. Decreased ADL/Functional Activities  3. Decreased Transfer Abilities  4. Decreased Ambulation Ability/Technique  5. Decreased Balance  6. Increased Pain  7. Decreased Activity Tolerance  8. Decreased Pacing Skills  9. Decreased Work Simplification/Energy Conservation Techniques  10. Increased Fatigue  11. Decreased Flexibility/Joint Mobility  12. Decreased [place] with Home Exercise Program INTERVENTIONS PLANNED:  1. Balance Exercise  2. Bed Mobility  3. Cold  4. Family Education  5. Gait Training  6. Heat  7. Home Exercise Program (HEP)  8. Manual Therapy  9. Neuromuscular Re-education/Strengthening  10.  Range of Motion (ROM)  11. Therapeutic Activites  12. Therapeutic Exercise/Strengthening  13. Transfer Training  14. aquatic therapy   15. Postural training/ training   TREATMENT PLAN:  Effective Dates: 5/26/17 TO 8/26/17. Frequency/Duration: 2 times a week for 8 weeks  GOALS: (Goals have been discussed and agreed upon with patient.)  DISCHARGE GOALS: Time Frame: 8 weeks  Patient will be independent with home exercise program within 8 weeks in order to improve independence with management of patient's symptoms and/or functional deficits. Patient will improve their fibromyalgia impact scale score to less than 70 points within 8 weeks in order to improve activity tolerance and pain free independence with activities of daily living and functional mobility. Patient will improve their physical subscale score for modified fatigue impact scale to 27 points within 8 weeks in order to improve activity tolerance. Rehabilitation Potential For Stated Goals: Good            The information in this section was collected on 5/26/17 (except where otherwise noted). HISTORY:   History of Present Injury/Illness (Reason for Referral):  Remington Avalos notes she is going to try physical therapy again \"even though I know it won't help. \" She notes she is looking to hire a  due to [employer]" with medical care and work related issues. She notes she has been on short term disability due to fatigue and increased pain. She plans to return to work 5/30/17. She notes fibromyalgia symptoms are worsening. Past Medical History/Comorbidities: anemia, fibromyalgia, GERD, hypothyroidism, migraine, morbid obesity, oligomenorrhea, ovarian cyst, vitamin D deficiency, gastrectomy  Social History/Living Environment:     Remington Avalos lives with her teenage daughter, no steps to enter house, one level home  Prior Level of Function/Work/Activity:  Remington Avalos works full time - plans to return to work 5/30/17.  Her job requirements include prolonged sitting, computer, and phone use  Personal Factors:          Sex:  female        Age:  40 y.o. Current Medications:  Trisprintec, lyrica, zomig, cymbalta, metformin, synthroid, iron, valtrex, vitamin D3, multivitamin   Date Last Reviewed:  6/20/2017   Number of Personal Factors/Comorbidities that affect the Plan of Care: 1-2: MODERATE COMPLEXITY   EXAMINATION:   Observation/Orthostatic Postural Assessment:  Assessed 5/26/17 Lorina Duverney sits with forward head and rounded shoulders which indicate tight anterior chest musculature, upper trapezius, and levator scapula and weak posterior scapula musculature and deep cervical flexors.  Lorina Duverney stands with left iliac crest superior to right, left PSIS superior to right, left ASIS inferior to right  Palpation:  Assessed 5/26/17 Lorina Duverney notes tenderness to palpate throughout body during assessment  ROM: Assessed  5/26/17  Selective functional movement assessment  Cervical flexion 75%  Cervical extension 90%  Cervical rotation/sidebend bilaterally 75%  appleys scratch test external rotation T4 - feels good  appleys scratch test internal rotation T7 - pain left anterior shoulder and upper arm   Multisegmental flexion 75% - pain right low back region  Multisegmental extension 75% - doesn't hurt  Multisegmental rotation 75% - pain with right rotation  Single leg stance - at least 3 seconds bilaterally   Strength: Assessed  5/26/17   4/5 all major muscle groups bilateral upper and lower extremities   Special Tests: Assessed 5/26/17  Slump test - negative bilaterally  Straight leg raise- negative bilaterally, tight hamstrings bilaterally   Neurological Screen: Assessed 5/26/17  Myotomes: strong and symmetrical bilateral upper and lower extremities   Dermatomes: tingling noted bilateral hands and feet  Functional Mobility:  Assessed 5/26/17 Lorina Duverney notes she requires assistance with carrying groceries, she notes she ambulates with straight cane at times due to increased pain, she notes difficulty completing house chores without significant rest between tasks  Balance:  Assessed 5/26/17 see single leg stance above  Mental Status:  Assessed 5/26/17  Alert and oriented x 4   Body Structures Involved:  1. Nerves  2. Joints  3. Muscles  4. Ligaments Body Functions Affected:  1. Sensory/Pain  2. Neuromusculoskeletal  3. Movement Related  4. Skin Related Activities and Participation Affected:  1. General Tasks and Demands  2. Mobility  3. Self Care  4. Domestic Life  5. Interpersonal Interactions and Relationships  6.  Community, Social and [place]   Number of elements that affect the Plan of Care: 3: MODERATE COMPLEXITY   CLINICAL PRESENTATION:   Presentation: Evolving clinical presentation with changing clinical characteristics: MODERATE COMPLEXITY   CLINICAL DECISION MAKING:   Objective Measures: Assessed 5/26/17    Tool Used: Fibromyalgia Impact Questionnaire  Score:  Initial: 86.73    Interpretation of Score: <40 = Mild Fibromyalgia Syndrome   40-60 = Moderate Fibromyalgia Syndrome   >60-70 = Severe Fibromyalgia Syndrome  Score 0 1-18 19-37 38-57 58-77 78-96 97   Modifier CH CI CJ CK CL CM CN     Tool Used: Thompson Depression Index   Score:  Initial: 13    Interpretation of Score: 0-10 = ups and downs considered normal   11-16= mild mood disturbance   17-20= borderline clinical depression   21-30= moderate depression   31-40= severe depression   Over 40= extreme depression     Tool Used: Modified Fatigue Impact Scale  Score:  Initial: Total score: 68/84  Physical subscale score: 31  Cognitive subscale score: 29  Psychosocial subscale score: 8      Tool Used: 6-MINUTE WALK TEST  Score:  Initial: 420 feet    Interpretation of Score: Normal range varies but is approximately 5565-1314 Feet  Medical Necessity:   · Patient is expected to demonstrate progress in strength, coordination and functional technique to increase independence with pain free functional mobility and activities of daily living. · Patient demonstrates good rehab potential due to higher previous functional level. Reason for Services/Other Comments:  · Patient continues to require skilled intervention due to increased pain with functional mobility and decreased activity tolerance. Use of outcome tool(s) and clinical judgement create a POC that gives a: Questionable prediction of patient's progress: MODERATE COMPLEXITY            TREATMENT:   (In addition to Assessment/Re-Assessment sessions the following treatments were rendered)  Pre-treatment Symptoms/Complaints:  Tisha Han notes 4/10 pain whole body   Pain: Initial:     Pain Intensity 1: 4  Pain Location 1:  (whole body)  Pain Intervention(s) 1: Exercise      Therapeutic Exercise: ( 42 minutes):  Exercises per grid below to improve mobility, strength, balance and coordination. Required minimal visual and verbal cues to promote proper body alignment and promote proper body posture. Progressed complexity of movement as indicated.    Date:  6-5-17 Date:  6-12-17 Date:  6/19/17   Activity/Exercise Parameters Parameters Parameters   Nustep  Level 2  10 minutes Level 2  10 minutes  Level 3  10 minutes   Supine - hamstring stretch With strap   3 x 30 sec B With strap   3 x 30 sec B  3 sets   30 seconds bilaterally    Piriformis stretch  3 x 30 sec B 3 x 30 sec B 30 second hold  3 repetitions    Pelvic tilt  10 x 5 sec  10 x 5 sec hold  5 second hold with addition of isometric hip flexion 10 repetitions bilaterally    Lower trunk rotation  5 x 10 sec B 5 x 10 sec B  10 second hold   10 repetitions bilaterally   Knee to chest 3 x 30 sec      Straight leg raise  2 x 10 B    Side lying -   Clams   Hip abduction     X 10 B  X 10 B    rows  Yellow   x 10 B    Shoulder extension  Yellow   X 10 B    Posterior pelvic tilt (transverse abdominus contraction) with isometric hip flexion hold    5 second hold   10 repetitions bilaterally Treatment/Session Assessment:    Response to Treatment:  Ramirezshell Long notes improved symptoms when on NuStep  Pain: Post Treatment Session: 2/10  · Compliance with Program/Exercises: Will assess as treatment progresses. · Recommendations/Intent for next treatment session: \"Next visit will focus on advancements to more challenging activities\".   Total Treatment Duration:   PT Patient Time In/Time Out  Time In: 1545  Time Out: [address], PT

## 2017-06-22 ENCOUNTER — HOSPITAL ENCOUNTER (OUTPATIENT)
Dept: PHYSICAL THERAPY | Age: 37
Discharge: HOME | End: 2017-06-22
Payer: COMMERCIAL

## 2017-06-22 ENCOUNTER — APPOINTMENT (OUTPATIENT)
Dept: PHYSICAL THERAPY | Age: 37
End: 2017-06-22
Payer: COMMERCIAL

## 2017-06-22 NOTE — PROGRESS NOTES
Shellie Villatoro  : 1980 [facility] at [facility] [address], [address] [facility], [address]  [phone]   [phone]            2017        Patient called to cancel this aquatic therapy appointment due to cramps. Will follow up with this patient at the next visit.   Mao Umana, PTA

## 2017-06-26 ENCOUNTER — HOSPITAL ENCOUNTER (OUTPATIENT)
Dept: PHYSICAL THERAPY | Age: 37
Discharge: HOME | End: 2017-06-26
Payer: COMMERCIAL

## 2017-06-26 PROCEDURE — 97110 THERAPEUTIC EXERCISES: CPT

## 2017-06-26 NOTE — PROGRESS NOTES
Jluis Lebron  : 1980 [facility] at [address], [facility]  [phone]   [phone]        OUTPATIENT PHYSICAL THERAPY:Daily Note f 2017    ICD-10: Treatment Diagnosis: fibromyalgia (M79.7)  Difficulty in walking, not elsewhere classified (R26.2)  Precautions/Allergies:   Excedrin p.m. [diphenhydramine-acetaminophen]; Excedrin [acetaminophen-caffeine]; and Pepto-bismol [bismuth subsalicylate]   Fall Risk Score: 0 (? 5 = High Risk)  MD Orders: Evaluate and treat MEDICAL/REFERRING DIAGNOSIS:  Fibromyalgia [M79.7]   DATE OF ONSET: 17  REFERRING PHYSICIAN: Vonda Mata, [address]: 17   Patient has attended 6 physical therapy sessions including initial evaluation. INITIAL ASSESSMENT:  Ms. Dipak Ryan presents with severe self reported fibromyalgia symptoms and self reported mild mood disturbances. She presents with severe self reported fatigue and decreased activity tolerance with functional mobility (6 minute walk test). She presents with postural deviations listed in detail below. She would benefit from skilled physical therapy in order to restore prior level of function and prevent future impairment. PROBLEM LIST (Impacting functional limitations):  1. Decreased Strength  2. Decreased ADL/Functional Activities  3. Decreased Transfer Abilities  4. Decreased Ambulation Ability/Technique  5. Decreased Balance  6. Increased Pain  7. Decreased Activity Tolerance  8. Decreased Pacing Skills  9. Decreased Work Simplification/Energy Conservation Techniques  10. Increased Fatigue  11. Decreased Flexibility/Joint Mobility  12. Decreased [place] with Home Exercise Program INTERVENTIONS PLANNED:  1. Balance Exercise  2. Bed Mobility  3. Cold  4. Family Education  5. Gait Training  6. Heat  7. Home Exercise Program (HEP)  8. Manual Therapy  9. Neuromuscular Re-education/Strengthening  10.  Range of Motion (ROM)  11. Therapeutic Activites  12. Therapeutic Exercise/Strengthening  13. Transfer Training  14. aquatic therapy   15. Postural training/ training   TREATMENT PLAN:  Effective Dates: 5/26/17 TO 8/26/17. Frequency/Duration: 2 times a week for 8 weeks  GOALS: (Goals have been discussed and agreed upon with patient.)  DISCHARGE GOALS: Time Frame: 8 weeks  Patient will be independent with home exercise program within 8 weeks in order to improve independence with management of patient's symptoms and/or functional deficits. Patient will improve their fibromyalgia impact scale score to less than 70 points within 8 weeks in order to improve activity tolerance and pain free independence with activities of daily living and functional mobility. Patient will improve their physical subscale score for modified fatigue impact scale to 27 points within 8 weeks in order to improve activity tolerance. Rehabilitation Potential For Stated Goals: Good            The information in this section was collected on 5/26/17 (except where otherwise noted). HISTORY:   History of Present Injury/Illness (Reason for Referral):  Ra Torres notes she is going to try physical therapy again \"even though I know it won't help. \" She notes she is looking to hire a  due to [employer]" with medical care and work related issues. She notes she has been on short term disability due to fatigue and increased pain. She plans to return to work 5/30/17. She notes fibromyalgia symptoms are worsening. Past Medical History/Comorbidities: anemia, fibromyalgia, GERD, hypothyroidism, migraine, morbid obesity, oligomenorrhea, ovarian cyst, vitamin D deficiency, gastrectomy  Social History/Living Environment:     Ra Torres lives with her teenage daughter, no steps to enter house, one level home  Prior Level of Function/Work/Activity:  Ra Torres works full time - plans to return to work 5/30/17.  Her job requirements include prolonged sitting, computer, and phone use  Personal Factors:          Sex:  female        Age:  40 y.o. Current Medications:  Trisprintec, lyrica, zomig, cymbalta, metformin, synthroid, iron, valtrex, vitamin D3, multivitamin   Date Last Reviewed:  6/26/2017   Number of Personal Factors/Comorbidities that affect the Plan of Care: 1-2: MODERATE COMPLEXITY   EXAMINATION:   Observation/Orthostatic Postural Assessment:  Assessed 5/26/17 Salomón Lo sits with forward head and rounded shoulders which indicate tight anterior chest musculature, upper trapezius, and levator scapula and weak posterior scapula musculature and deep cervical flexors.  Salomón Lo stands with left iliac crest superior to right, left PSIS superior to right, left ASIS inferior to right  Palpation:  Assessed 5/26/17 Salomón Lo notes tenderness to palpate throughout body during assessment  ROM: Assessed  5/26/17  Selective functional movement assessment  Cervical flexion 75%  Cervical extension 90%  Cervical rotation/sidebend bilaterally 75%  appleys scratch test external rotation T4 - feels good  appleys scratch test internal rotation T7 - pain left anterior shoulder and upper arm   Multisegmental flexion 75% - pain right low back region  Multisegmental extension 75% - doesn't hurt  Multisegmental rotation 75% - pain with right rotation  Single leg stance - at least 3 seconds bilaterally   Strength: Assessed  5/26/17   4/5 all major muscle groups bilateral upper and lower extremities   Special Tests: Assessed 5/26/17  Slump test - negative bilaterally  Straight leg raise- negative bilaterally, tight hamstrings bilaterally   Neurological Screen: Assessed 5/26/17  Myotomes: strong and symmetrical bilateral upper and lower extremities   Dermatomes: tingling noted bilateral hands and feet  Functional Mobility:  Assessed 5/26/17 Salomón Lo notes she requires assistance with carrying groceries, she notes she ambulates with straight cane at times due to increased pain, she notes difficulty completing house chores without significant rest between tasks  Balance:  Assessed 5/26/17 see single leg stance above  Mental Status:  Assessed 5/26/17  Alert and oriented x 4   Body Structures Involved:  1. Nerves  2. Joints  3. Muscles  4. Ligaments Body Functions Affected:  1. Sensory/Pain  2. Neuromusculoskeletal  3. Movement Related  4. Skin Related Activities and Participation Affected:  1. General Tasks and Demands  2. Mobility  3. Self Care  4. Domestic Life  5. Interpersonal Interactions and Relationships  6.  Community, Social and [place]   Number of elements that affect the Plan of Care: 3: MODERATE COMPLEXITY   CLINICAL PRESENTATION:   Presentation: Evolving clinical presentation with changing clinical characteristics: MODERATE COMPLEXITY   CLINICAL DECISION MAKING:   Objective Measures: Assessed 5/26/17    Tool Used: Fibromyalgia Impact Questionnaire  Score:  Initial: 86.73    Interpretation of Score: <40 = Mild Fibromyalgia Syndrome   40-60 = Moderate Fibromyalgia Syndrome   >60-70 = Severe Fibromyalgia Syndrome  Score 0 1-18 19-37 38-57 58-77 78-96 97   Modifier CH CI CJ CK CL CM CN     Tool Used: Thompson Depression Index   Score:  Initial: 13    Interpretation of Score: 0-10 = ups and downs considered normal   11-16= mild mood disturbance   17-20= borderline clinical depression   21-30= moderate depression   31-40= severe depression   Over 40= extreme depression     Tool Used: Modified Fatigue Impact Scale  Score:  Initial: Total score: 68/84  Physical subscale score: 31  Cognitive subscale score: 29  Psychosocial subscale score: 8      Tool Used: 6-MINUTE WALK TEST  Score:  Initial: 420 feet    Interpretation of Score: Normal range varies but is approximately 6936-2857 Feet  Medical Necessity:   · Patient is expected to demonstrate progress in strength, coordination and functional technique to increase independence with pain free functional mobility and activities of daily living. · Patient demonstrates good rehab potential due to higher previous functional level. Reason for Services/Other Comments:  · Patient continues to require skilled intervention due to increased pain with functional mobility and decreased activity tolerance. Use of outcome tool(s) and clinical judgement create a POC that gives a: Questionable prediction of patient's progress: MODERATE COMPLEXITY            TREATMENT:   (In addition to Assessment/Re-Assessment sessions the following treatments were rendered)  Pre-treatment Symptoms/Complaints:  James Long notes 4-5/10 pain deep in left hip and up under both shoulder blades. Pain: Initial:   4-5/10. Therapeutic Exercise: ( 45 minutes):  Exercises per grid below to improve mobility, strength, balance and coordination. Required minimal visual and verbal cues to promote proper body alignment and promote proper body posture. Progressed complexity of movement as indicated.    Date:  6-5-17 Date:  6-12-17 Date:  6/19/17 6-26-17   Activity/Exercise Parameters Parameters Parameters    Nustep  Level 2  10 minutes Level 2  10 minutes  Level 3  10 minutes Physiostep   Level 2  10 minutes arms and legs    Supine - hamstring stretch With strap   3 x 30 sec B With strap   3 x 30 sec B  3 sets   30 seconds bilaterally  With strap  3 x 30 sec B   Piriformis stretch  3 x 30 sec B 3 x 30 sec B 30 second hold  3 repetitions  3 x 30 sec B and hip capsule stretch   3 x 30 sec B   Pelvic tilt  10 x 5 sec  10 x 5 sec hold  5 second hold with addition of isometric hip flexion 10 repetitions bilaterally  10 x 5 sec    Lower trunk rotation  5 x 10 sec B 5 x 10 sec B  10 second hold   10 repetitions bilaterally 10 x 10 sec B    Knee to chest 3 x 30 sec       Straight leg raise  2 x 10 B  2 x 10 B   Side lying -   Clams   Hip abduction     X 10 B  X 10 B    2 x 10 B  2 x 10 B   rows  Yellow   x 10 B     Shoulder extension Yellow   X 10 B     Posterior pelvic tilt (transverse abdominus contraction) with isometric hip flexion hold    5 second hold   10 repetitions bilaterally  10 x 5 sec hold B                    Treatment/Session Assessment:    Response to Treatment:  Ra Torres reports mobility on Physiostep is easier today. Pain: Post Treatment Session: 4-5/10  · Compliance with Program/Exercises: Will assess as treatment progresses. · Recommendations/Intent for next treatment session: \"Next visit will focus on advancements to more challenging activities\".   Total Treatment Duration:  45 minutes   PT Patient Time In/Time Out  Time In: 1520 (Patient was 5 minutes late )  Time Out: [address], PTA

## 2017-06-29 ENCOUNTER — HOSPITAL ENCOUNTER (OUTPATIENT)
Dept: PHYSICAL THERAPY | Age: 37
Discharge: HOME | End: 2017-06-29
Payer: COMMERCIAL

## 2017-06-29 PROCEDURE — 97113 AQUATIC THERAPY/EXERCISES: CPT

## 2017-06-29 NOTE — PROGRESS NOTES
Kathy Barrier  : 1980 [facility] at [address], [address] [facility]  [phone]   [phone]        OUTPATIENT PHYSICAL THERAPY:Daily Note and Aquatic Note 2017    ICD-10: Treatment Diagnosis: fibromyalgia (M79.7)  Difficulty in walking, not elsewhere classified (R26.2)  Precautions/Allergies:   Excedrin p.m. [diphenhydramine-acetaminophen]; Excedrin [acetaminophen-caffeine]; and Pepto-bismol [bismuth subsalicylate]   Fall Risk Score: 0 (? 5 = High Risk)  MD Orders: Evaluate and treat MEDICAL/REFERRING DIAGNOSIS:  Fibromyalgia [M79.7]   DATE OF ONSET: 17  REFERRING PHYSICIAN: Gary Cheek, [address]: 17   Patient has attended 1 physical therapy session including initial evaluation. INITIAL ASSESSMENT:  Ms. Juan Blount presents with severe self reported fibromyalgia symptoms and self reported mild mood disturbances. She presents with severe self reported fatigue and decreased activity tolerance with functional mobility (6 minute walk test). She presents with postural deviations listed in detail below. She would benefit from skilled physical therapy in order to restore prior level of function and prevent future impairment. PROBLEM LIST (Impacting functional limitations):  1. Decreased Strength  2. Decreased ADL/Functional Activities  3. Decreased Transfer Abilities  4. Decreased Ambulation Ability/Technique  5. Decreased Balance  6. Increased Pain  7. Decreased Activity Tolerance  8. Decreased Pacing Skills  9. Decreased Work Simplification/Energy Conservation Techniques  10. Increased Fatigue  11. Decreased Flexibility/Joint Mobility  12. Decreased [place] with Home Exercise Program INTERVENTIONS PLANNED:  1. Balance Exercise  2. Bed Mobility  3. Cold  4. Family Education  5. Gait Training  6. Heat  7. Home Exercise Program (HEP)  8. Manual Therapy  9.  Neuromuscular Re-education/Strengthening  10. Range of Motion (ROM)  11. Therapeutic Activites  12. Therapeutic Exercise/Strengthening  13. Transfer Training  14. aquatic therapy   15. Postural training/ training   TREATMENT PLAN:  Effective Dates: 5/26/17 TO 8/26/17. Frequency/Duration: 2 times a week for 8 weeks  GOALS: (Goals have been discussed and agreed upon with patient.)  DISCHARGE GOALS: Time Frame: 8 weeks  Patient will be independent with home exercise program within 8 weeks in order to improve independence with management of patient's symptoms and/or functional deficits. Patient will improve their fibromyalgia impact scale score to less than 70 points within 8 weeks in order to improve activity tolerance and pain free independence with activities of daily living and functional mobility. Patient will improve their physical subscale score for modified fatigue impact scale to 27 points within 8 weeks in order to improve activity tolerance. Rehabilitation Potential For Stated Goals: Good  Regarding Calvin ovalles, I certify that the treatment plan above will be carried out by a therapist or under their direction. Thank you for this referral,  Marycarmen Taylor PTA     Referring Physician Signature: Markos Pascual, *              Date                    The information in this section was collected on 5/26/17 (except where otherwise noted). HISTORY:   History of Present Injury/Illness (Reason for Referral):  Fernando Zurita notes she is going to try physical therapy again \"even though I know it won't help. \" She notes she is looking to hire a  due to [employer]" with medical care and work related issues. She notes she has been on short term disability due to fatigue and increased pain. She plans to return to work 5/30/17. She notes fibromyalgia symptoms are worsening.    Past Medical History/Comorbidities: anemia, fibromyalgia, GERD, hypothyroidism, migraine, morbid obesity, oligomenorrhea, ovarian cyst, vitamin D deficiency, gastrectomy  Social History/Living Environment:     Denilson Mills lives with her teenage daughter, no steps to enter house, one level home  Prior Level of Function/Work/Activity:  Denilson Mills works full time - plans to return to work 5/30/17. Her job requirements include prolonged sitting, computer, and phone use  Personal Factors:          Sex:  female        Age:  40 y.o. Current Medications:  Trisprintec, lyrica, zomig, cymbalta, metformin, synthroid, iron, valtrex, vitamin D3, multivitamin   Date Last Reviewed:  6/29/2017   Number of Personal Factors/Comorbidities that affect the Plan of Care: 1-2: MODERATE COMPLEXITY   EXAMINATION:   Observation/Orthostatic Postural Assessment:  Assessed 5/26/17 Denilson Mills sits with forward head and rounded shoulders which indicate tight anterior chest musculature, upper trapezius, and levator scapula and weak posterior scapula musculature and deep cervical flexors.  Denilson Mills stands with left iliac crest superior to right, left PSIS superior to right, left ASIS inferior to right  Palpation:  Assessed 5/26/17 Denilson Mills notes tenderness to palpate throughout body during assessment  ROM: Assessed  5/26/17  Selective functional movement assessment  Cervical flexion 75%  Cervical extension 90%  Cervical rotation/sidebend bilaterally 75%  appleys scratch test external rotation T4 - feels good  appleys scratch test internal rotation T7 - pain left anterior shoulder and upper arm   Multisegmental flexion 75% - pain right low back region  Multisegmental extension 75% - doesn't hurt  Multisegmental rotation 75% - pain with right rotation  Single leg stance - at least 3 seconds bilaterally   Strength: Assessed  5/26/17   4/5 all major muscle groups bilateral upper and lower extremities   Special Tests: Assessed 5/26/17  Slump test - negative bilaterally  Straight leg raise- negative bilaterally, tight hamstrings bilaterally   Neurological Screen: Assessed 5/26/17  Myotomes: strong and symmetrical bilateral upper and lower extremities   Dermatomes: tingling noted bilateral hands and feet  Functional Mobility:  Assessed 5/26/17 Lataleta Farm notes she requires assistance with carrying groceries, she notes she ambulates with straight cane at times due to increased pain, she notes difficulty completing house chores without significant rest between tasks  Balance:  Assessed 5/26/17 see single leg stance above  Mental Status:  Assessed 5/26/17  Alert and oriented x 4   Body Structures Involved:  1. Nerves  2. Joints  3. Muscles  4. Ligaments Body Functions Affected:  1. Sensory/Pain  2. Neuromusculoskeletal  3. Movement Related  4. Skin Related Activities and Participation Affected:  1. General Tasks and Demands  2. Mobility  3. Self Care  4. Domestic Life  5. Interpersonal Interactions and Relationships  6.  Community, Social and [place]   Number of elements that affect the Plan of Care: 3: MODERATE COMPLEXITY   CLINICAL PRESENTATION:   Presentation: Evolving clinical presentation with changing clinical characteristics: MODERATE COMPLEXITY   CLINICAL DECISION MAKING:   Objective Measures: Assessed 5/26/17    Tool Used: Fibromyalgia Impact Questionnaire  Score:  Initial: 86.73    Interpretation of Score: <40 = Mild Fibromyalgia Syndrome   40-60 = Moderate Fibromyalgia Syndrome   >60-70 = Severe Fibromyalgia Syndrome  Score 0 1-18 19-37 38-57 58-77 78-96 97   Modifier CH CI CJ CK CL CM CN     Tool Used: Thompson Depression Index   Score:  Initial: 13    Interpretation of Score: 0-10 = ups and downs considered normal   11-16= mild mood disturbance   17-20= borderline clinical depression   21-30= moderate depression   31-40= severe depression   Over 40= extreme depression     Tool Used: Modified Fatigue Impact Scale  Score:  Initial: Total score: 68/84  Physical subscale score: 31  Cognitive subscale score: 29  Psychosocial subscale score: 8      Tool Used: 6-MINUTE WALK TEST  Score:  Initial: 420 feet    Interpretation of Score: Normal range varies but is approximately 8923-5124 Feet  Medical Necessity:   · Patient is expected to demonstrate progress in strength, coordination and functional technique to increase independence with pain free functional mobility and activities of daily living. · Patient demonstrates good rehab potential due to higher previous functional level. Reason for Services/Other Comments:  · Patient continues to require skilled intervention due to increased pain with functional mobility and decreased activity tolerance. Use of outcome tool(s) and clinical judgement create a POC that gives a: Questionable prediction of patient's progress: MODERATE COMPLEXITY            TREATMENT:     Aquatic Therapy (45 minutes): Aquatic treatment performed per flow grid for Decreased muscle strength, Decreased endurance and Decreased activity endurance.       Aquatic Exercise Log       Date  6-8-17 Date  6-15-17 Date  6/29/17 Date   Date     Activity/ Exercise Parameters Parameters Parameters Parameters Parameters   Walking forward 2 laps 2 laps 2 laps     Walking backward 2 laps 2 laps 2 laps     Walking sideways 2 laps 2 laps 2 laps       Marching 2 laps 2 laps 2 laps       Goose Step 2 laps 2 laps 2 laps       Tip toes  2 laps 2 laps       Heels  2 laps 2 laps       Lunges        Side step squats  2 laps      LE Exercises noodle noodle Big noodle       Hip Flex/Ext Marching x 15 B Marching x 20 B 20x B       Hip Abd/Add X 15 B X 20 B 20x B       Hip IR/ER          Calf raises          Knee Flex          Squats          Leg Circles Deeper water x 10 each way each leg Deeper water x 10 each way each leg        Step Ups   20x B     UE Exercises noodle noodle Big noodle       Squeeze In X 15 B X 20 B 20x B       Push Down X 15 B X 20 B 10x B       Pull Down X 15 B  10x B       Bicep/Tricep        Rows/Press outs Push ups x 20  Pushing noodle forward/  Backward fast      Chi Positions          Trunk Rotation        Deep H2O/ Noodles noodle noodle        Stabilization          Arms only Straddle - 2 laps Straddle - 2 laps Straddle 2 laps  frwrd/bkwrd       Legs only Straddle - 2 laps  Both - 2 laps Straddle - 2 laps  Both - 2 laps Straddle 2 laps  frwd/bkwrd     [employer] X 20  X 20  Noodle  2 min x2       Scissors X 20  X 20   Combo x 20  Noodle  2 min x2       Crab walk 2 laps 2 laps      Lower abdominal   work  Knees to chest x 10   Knees to chest then kick out and pull down  10x       Cardio          Jogging  Punching noodles down at sides while jogging - 3 minutes  Holding arm rings down while jogging  2 min     Lap   Swimming          Stretches [x]  In [place]  []  [place]* [x]  In [place]  []  Whirlpool* [x]  In Pool  []  Whirlpool* []  In Pool  []  Whirlpool* []  In Pool  []  Whirlpool*     Hamstrings X 3 B self X 3 B  x3 seated       Heelcords X 3 B self X 3 B x3 seated       Piriformis X 3 B self X 3 B x3 seated       Neck                  * For increased soft tissue extensibility a warm water (over 100°F) environment was utilized. Supervision/monitoring was provided at all times. Pre-treatment pain - none noted  Treatment/Session Assessment:  Patient doing well and enjoying pool exercises. No fatigue noted. Michael Trammell Response to Treatment:  Loulou Sandra seems open with treatment today. No difficulties noted. Pain: Post Treatment Session: 5.5/10  · Compliance with Program/Exercises: Will assess as treatment progresses. · Recommendations/Intent for next treatment session: \"Next visit will focus on advancements to more challenging activities\".   Total Treatment Duration: 45 minutes   PT Patient Time In/Time Out  Time In: 1515  Time Out: [address], PTA            d[place]

## 2017-07-03 ENCOUNTER — HOSPITAL ENCOUNTER (OUTPATIENT)
Dept: PHYSICAL THERAPY | Age: 37
Discharge: HOME | End: 2017-07-03
Payer: COMMERCIAL

## 2017-07-03 PROCEDURE — 97140 MANUAL THERAPY 1/> REGIONS: CPT

## 2017-07-03 PROCEDURE — 97110 THERAPEUTIC EXERCISES: CPT

## 2017-07-03 NOTE — PROGRESS NOTES
Livia Light  : [facility] at [address], [facility]  [phone]   [phone]        OUTPATIENT PHYSICAL THERAPY:Progress Note f 7/3/2017    ICD-10: Treatment Diagnosis: fibromyalgia (M79.7)  Difficulty in walking, not elsewhere classified (R26.2)  Precautions/Allergies:   Excedrin p.m. [diphenhydramine-acetaminophen]; Excedrin [acetaminophen-caffeine]; and Pepto-bismol [bismuth subsalicylate]   Fall Risk Score: 0 (? 5 = High Risk)  MD Orders: Evaluate and treat MEDICAL/REFERRING DIAGNOSIS:  Fibromyalgia [M79.7]   DATE OF ONSET: 17  REFERRING PHYSICIAN: Carlos Roberson [address]: 17   Patient has attended 9 physical therapy sessions including initial evaluation. INITIAL ASSESSMENT:  Ms. Cassia Haider presents with moderate self reported fibromyalgia symptoms and self reported ups and downs considered normal. She presents with moderate self reported fatigue and improving activity tolerance with functional mobility (6 minute walk test). She presents with postural deviations listed in detail below. She would benefit from skilled physical therapy in order to restore prior level of function and prevent future impairment. PROBLEM LIST (Impacting functional limitations):  1. Decreased Strength  2. Decreased ADL/Functional Activities  3. Decreased Transfer Abilities  4. Decreased Ambulation Ability/Technique  5. Decreased Balance  6. Increased Pain  7. Decreased Activity Tolerance  8. Decreased Pacing Skills  9. Decreased Work Simplification/Energy Conservation Techniques  10. Increased Fatigue  11. Decreased Flexibility/Joint Mobility  12. Decreased [place] with Home Exercise Program INTERVENTIONS PLANNED:  1. Balance Exercise  2. Bed Mobility  3. Cold  4. Family Education  5. Gait Training  6. Heat  7. Home Exercise Program (HEP)  8. Manual Therapy  9.  Neuromuscular Re-education/Strengthening  10. Range of Motion (ROM)  11. Therapeutic Activites  12. Therapeutic Exercise/Strengthening  13. Transfer Training  14. aquatic therapy   15. Postural training/ training   TREATMENT PLAN:  Effective Dates: 5/26/17 TO 8/26/17. Frequency/Duration: 2 times a week for 8 weeks  GOALS: (Goals have been discussed and agreed upon with patient.)  DISCHARGE GOALS: Time Frame: 8 weeks  Patient will be independent with home exercise program within 8 weeks in order to improve independence with management of patient's symptoms and/or functional deficits. (Kenneth Hawkins 7/3/17)  Patient will improve their fibromyalgia impact scale score to less than 70 points within 8 weeks in order to improve activity tolerance and pain free independence with activities of daily living and functional mobility. (MET 7/3/17)  Patient will improve their physical subscale score for modified fatigue impact scale to 27 points within 8 weeks in order to improve activity tolerance. (CONTINUE 7/3/17)  Rehabilitation Potential For Stated Goals: Good            The information in this section was collected on 5/26/17 (except where otherwise noted). HISTORY:   History of Present Injury/Illness (Reason for Referral):  Shellie Villatoro notes she is going to try physical therapy again \"even though I know it won't help. \" She notes she is looking to hire a  due to [employer]" with medical care and work related issues. She notes she has been on short term disability due to fatigue and increased pain. She plans to return to work 5/30/17. She notes fibromyalgia symptoms are worsening.    Past Medical History/Comorbidities: anemia, fibromyalgia, GERD, hypothyroidism, migraine, morbid obesity, oligomenorrhea, ovarian cyst, vitamin D deficiency, gastrectomy  Social History/Living Environment:     Shellie Villatoro lives with her teenage daughter, no steps to enter house, one level home  Prior Level of Function/Work/Activity:  Kathy Dominguez works full time - plans to return to work 5/30/17. Her job requirements include prolonged sitting, computer, and phone use  Personal Factors:          Sex:  female        Age:  40 y.o. Current Medications:  Trisprintec, lyrica, zomig, cymbalta, metformin, synthroid, iron, valtrex, vitamin D3, multivitamin   Date Last Reviewed:  7/3/2017   Number of Personal Factors/Comorbidities that affect the Plan of Care: 1-2: MODERATE COMPLEXITY   EXAMINATION:   Observation/Orthostatic Postural Assessment:  Assessed 5/26/17 Kathy Dominguez sits with forward head and rounded shoulders which indicate tight anterior chest musculature, upper trapezius, and levator scapula and weak posterior scapula musculature and deep cervical flexors.  Kathy Dominguez stands with left iliac crest superior to right, left PSIS superior to right, left ASIS inferior to right  Palpation:  Assessed 5/26/17 Kathy Dominguez notes tenderness to palpate throughout body during assessment  ROM: Assessed  5/26/17  Selective functional movement assessment  Cervical flexion 75%  Cervical extension 90%  Cervical rotation/sidebend bilaterally 75%  appleys scratch test external rotation T4 - feels good  appleys scratch test internal rotation T7 - pain left anterior shoulder and upper arm   Multisegmental flexion 75% - pain right low back region  Multisegmental extension 75% - doesn't hurt  Multisegmental rotation 75% - pain with right rotation  Single leg stance - at least 3 seconds bilaterally   Strength: Assessed  5/26/17   4/5 all major muscle groups bilateral upper and lower extremities   Special Tests: Assessed 5/26/17  Slump test - negative bilaterally  Straight leg raise- negative bilaterally, tight hamstrings bilaterally   Neurological Screen: Assessed 5/26/17  Myotomes: strong and symmetrical bilateral upper and lower extremities   Dermatomes: tingling noted bilateral hands and feet  Functional Mobility: Assessed 5/26/17 Goran Cotton notes she requires assistance with carrying groceries, she notes she ambulates with straight cane at times due to increased pain, she notes difficulty completing house chores without significant rest between tasks  Balance:  Assessed 5/26/17 see single leg stance above  Mental Status:  Assessed 5/26/17  Alert and oriented x 4   Body Structures Involved:  1. Nerves  2. Joints  3. Muscles  4. Ligaments Body Functions Affected:  1. Sensory/Pain  2. Neuromusculoskeletal  3. Movement Related  4. Skin Related Activities and Participation Affected:  1. General Tasks and Demands  2. Mobility  3. Self Care  4. Domestic Life  5. Interpersonal Interactions and Relationships  6.  Community, Social and [place]   Number of elements that affect the Plan of Care: 3: MODERATE COMPLEXITY   CLINICAL PRESENTATION:   Presentation: Evolving clinical presentation with changing clinical characteristics: MODERATE COMPLEXITY   CLINICAL DECISION MAKING:   Objective Measures: Assessed 7/3/17    Tool Used: Fibromyalgia Impact Questionnaire  Score:  Initial: 86.73 60.1   Interpretation of Score: <40 = Mild Fibromyalgia Syndrome   40-60 = Moderate Fibromyalgia Syndrome   >60-70 = Severe Fibromyalgia Syndrome  Score 0 1-18 19-37 38-57 58-77 78-96 97   Modifier CH CI CJ CK CL CM CN     Tool Used: Thompson Depression Index   Score:  Initial: 13 10   Interpretation of Score: 0-10 = ups and downs considered normal   11-16= mild mood disturbance   17-20= borderline clinical depression   21-30= moderate depression   31-40= severe depression   Over 40= extreme depression     Tool Used: Modified Fatigue Impact Scale  Score:  Initial: Total score: 68/84  Physical subscale score: 31  Cognitive subscale score: 29  Psychosocial subscale score: 8 Total score: 59  Physical subscale score: 28  Cognitive subscale score: 25  Psychosocial subscale score 6     Tool Used: 6-MINUTE WALK TEST  Score:  Initial: 420 feet 780 feet - no standing rests   Interpretation of Score: Normal range varies but is approximately 5592-8503 Feet  Medical Necessity:   · Patient is expected to demonstrate progress in strength, coordination and functional technique to increase independence with pain free functional mobility and activities of daily living. · Patient demonstrates good rehab potential due to higher previous functional level. Reason for Services/Other Comments:  · Patient continues to require skilled intervention due to increased pain with functional mobility and decreased activity tolerance. Use of outcome tool(s) and clinical judgement create a POC that gives a: Questionable prediction of patient's progress: MODERATE COMPLEXITY            TREATMENT:   (In addition to Assessment/Re-Assessment sessions the following treatments were rendered)  Pre-treatment Symptoms/Complaints:  Martinez Crouch notes 4/10 pain in back and leg  Pain: Initial:   Pain Intensity 1: 4  Pain Location 1: Back, Leg  Pain Orientation 1: Right  Pain Intervention(s) 1: Exercise, Therapeutic touch      Therapeutic Exercise: ( 29 minutes):  Exercises per grid below to improve mobility, strength, balance and coordination. Required minimal visual and verbal cues to promote proper body alignment and promote proper body posture. Progressed complexity of movement as indicated.    Date:  6-5-17 Date:  6-12-17 Date:  6/19/17 6-26-17 Date  7/3/17   Activity/Exercise Parameters Parameters Parameters     Nustep  Level 2  10 minutes Level 2  10 minutes  Level 3  10 minutes Physiostep   Level 2  10 minutes arms and legs  NuStep level 3 10 minutes arms and legs   Supine - hamstring stretch With strap   3 x 30 sec B With strap   3 x 30 sec B  3 sets   30 seconds bilaterally  With strap  3 x 30 sec B    Piriformis stretch  3 x 30 sec B 3 x 30 sec B 30 second hold  3 repetitions  3 x 30 sec B and hip capsule stretch   3 x 30 sec B    Pelvic tilt  10 x 5 sec  10 x 5 sec hold  5 second hold with addition of isometric hip flexion 10 repetitions bilaterally  10 x 5 sec     Lower trunk rotation  5 x 10 sec B 5 x 10 sec B  10 second hold   10 repetitions bilaterally 10 x 10 sec B  10 second 10 repetitions bilaterally    Knee to chest 3 x 30 sec        Straight leg raise  2 x 10 B  2 x 10 B    Side lying -   Clams   Hip abduction     X 10 B  X 10 B    2 x 10 B  2 x 10 B 30 repetitions bilaterally    rows  Yellow   x 10 B      Shoulder extension  Yellow   X 10 B      Posterior pelvic tilt (transverse abdominus contraction) with isometric hip flexion hold    5 second hold   10 repetitions bilaterally  10 x 5 sec hold B     Ambulation for improved activity tolerance      780 feet - in 6 minutes no standing rests             Manual Therapy (     9 minutes): Soft tissue mobilization with foam roller thoracic, lumbar and bilateral posterior lower extremities for improved mobility and decreased pain     Therapeutic Modalities:                                                                                                 Treatment/Session Assessment:    Response to Treatment:  Regina Qureshi notes 3/10 pain in back following roll with foam roller  Pain: Post Treatment Session: 3/10  · Compliance with Program/Exercises: Will assess as treatment progresses. · Recommendations/Intent for next treatment session: \"Next visit will focus on advancements to more challenging activities\".   Total Treatment Duration:    PT Patient Time In/Time Out  Time In: 1530  Time Out: Litzy [address], PT

## 2017-07-06 ENCOUNTER — HOSPITAL ENCOUNTER (OUTPATIENT)
Dept: PHYSICAL THERAPY | Age: 37
Discharge: HOME | End: 2017-07-06
Payer: COMMERCIAL

## 2017-07-06 PROCEDURE — 97113 AQUATIC THERAPY/EXERCISES: CPT

## 2017-07-06 NOTE — PROGRESS NOTES
Martinez Crouch  : 1980 [facility] at [address], [address] [facility]  [phone]   [phone]        OUTPATIENT PHYSICAL THERAPY:Daily Note and Aquatic Note 2017    ICD-10: Treatment Diagnosis: fibromyalgia (M79.7)  Difficulty in walking, not elsewhere classified (R26.2)  Precautions/Allergies:   Excedrin p.m. [diphenhydramine-acetaminophen]; Excedrin [acetaminophen-caffeine]; and Pepto-bismol [bismuth subsalicylate]   Fall Risk Score: 0 (? 5 = High Risk)  MD Orders: Evaluate and treat MEDICAL/REFERRING DIAGNOSIS:  Fibromyalgia [M79.7]   DATE OF ONSET: 17  REFERRING PHYSICIAN: Bev Andersen, [address]: 17   Patient has attended 1 physical therapy session including initial evaluation. INITIAL ASSESSMENT:  Ms. Arash Bland presents with severe self reported fibromyalgia symptoms and self reported mild mood disturbances. She presents with severe self reported fatigue and decreased activity tolerance with functional mobility (6 minute walk test). She presents with postural deviations listed in detail below. She would benefit from skilled physical therapy in order to restore prior level of function and prevent future impairment. PROBLEM LIST (Impacting functional limitations):  1. Decreased Strength  2. Decreased ADL/Functional Activities  3. Decreased Transfer Abilities  4. Decreased Ambulation Ability/Technique  5. Decreased Balance  6. Increased Pain  7. Decreased Activity Tolerance  8. Decreased Pacing Skills  9. Decreased Work Simplification/Energy Conservation Techniques  10. Increased Fatigue  11. Decreased Flexibility/Joint Mobility  12. Decreased [place] with Home Exercise Program INTERVENTIONS PLANNED:  1. Balance Exercise  2. Bed Mobility  3. Cold  4. Family Education  5. Gait Training  6. Heat  7. Home Exercise Program (HEP)  8. Manual Therapy  9. Neuromuscular Re-education/Strengthening  10.  Range of Motion (ROM)  11. Therapeutic Activites  12. Therapeutic Exercise/Strengthening  13. Transfer Training  14. aquatic therapy   15. Postural training/ training   TREATMENT PLAN:  Effective Dates: 5/26/17 TO 8/26/17. Frequency/Duration: 2 times a week for 8 weeks  GOALS: (Goals have been discussed and agreed upon with patient.)  DISCHARGE GOALS: Time Frame: 8 weeks  Patient will be independent with home exercise program within 8 weeks in order to improve independence with management of patient's symptoms and/or functional deficits. Patient will improve their fibromyalgia impact scale score to less than 70 points within 8 weeks in order to improve activity tolerance and pain free independence with activities of daily living and functional mobility. Patient will improve their physical subscale score for modified fatigue impact scale to 27 points within 8 weeks in order to improve activity tolerance. Rehabilitation Potential For Stated Goals: Good  Regarding Jasmyne Moncada therapy, I certify that the treatment plan above will be carried out by a therapist or under their direction. Thank you for this referral,  Yane Jimenez PTA     Referring Physician Signature: Mamie Nicholson, *              Date                    The information in this section was collected on 5/26/17 (except where otherwise noted). HISTORY:   History of Present Injury/Illness (Reason for Referral):  Maria R Villafana notes she is going to try physical therapy again \"even though I know it won't help. \" She notes she is looking to hire a  due to [employer]" with medical care and work related issues. She notes she has been on short term disability due to fatigue and increased pain. She plans to return to work 5/30/17. She notes fibromyalgia symptoms are worsening.    Past Medical History/Comorbidities: anemia, fibromyalgia, GERD, hypothyroidism, migraine, morbid obesity, oligomenorrhea, ovarian cyst, vitamin D deficiency, gastrectomy  Social History/Living Environment:     Macie Dorado lives with her teenage daughter, no steps to enter house, one level home  Prior Level of Function/Work/Activity:  Macie Dorado works full time - plans to return to work 5/30/17. Her job requirements include prolonged sitting, computer, and phone use  Personal Factors:          Sex:  female        Age:  40 y.o. Current Medications:  Trisprintec, lyrica, zomig, cymbalta, metformin, synthroid, iron, valtrex, vitamin D3, multivitamin   Date Last Reviewed:  7/6/2017   Number of Personal Factors/Comorbidities that affect the Plan of Care: 1-2: MODERATE COMPLEXITY   EXAMINATION:   Observation/Orthostatic Postural Assessment:  Assessed 5/26/17 Macie Dorado sits with forward head and rounded shoulders which indicate tight anterior chest musculature, upper trapezius, and levator scapula and weak posterior scapula musculature and deep cervical flexors.  Macie Dorado stands with left iliac crest superior to right, left PSIS superior to right, left ASIS inferior to right  Palpation:  Assessed 5/26/17 Macie Dorado notes tenderness to palpate throughout body during assessment  ROM: Assessed  5/26/17  Selective functional movement assessment  Cervical flexion 75%  Cervical extension 90%  Cervical rotation/sidebend bilaterally 75%  appleys scratch test external rotation T4 - feels good  appleys scratch test internal rotation T7 - pain left anterior shoulder and upper arm   Multisegmental flexion 75% - pain right low back region  Multisegmental extension 75% - doesn't hurt  Multisegmental rotation 75% - pain with right rotation  Single leg stance - at least 3 seconds bilaterally   Strength: Assessed  5/26/17   4/5 all major muscle groups bilateral upper and lower extremities   Special Tests: Assessed 5/26/17  Slump test - negative bilaterally  Straight leg raise- negative bilaterally, tight hamstrings bilaterally   Neurological Screen: Assessed 5/26/17  Myotomes: strong and symmetrical bilateral upper and lower extremities   Dermatomes: tingling noted bilateral hands and feet  Functional Mobility:  Assessed 5/26/17 Regina Qureshi notes she requires assistance with carrying groceries, she notes she ambulates with straight cane at times due to increased pain, she notes difficulty completing house chores without significant rest between tasks  Balance:  Assessed 5/26/17 see single leg stance above  Mental Status:  Assessed 5/26/17  Alert and oriented x 4   Body Structures Involved:  1. Nerves  2. Joints  3. Muscles  4. Ligaments Body Functions Affected:  1. Sensory/Pain  2. Neuromusculoskeletal  3. Movement Related  4. Skin Related Activities and Participation Affected:  1. General Tasks and Demands  2. Mobility  3. Self Care  4. Domestic Life  5. Interpersonal Interactions and Relationships  6.  Community, Social and [place]   Number of elements that affect the Plan of Care: 3: MODERATE COMPLEXITY   CLINICAL PRESENTATION:   Presentation: Evolving clinical presentation with changing clinical characteristics: MODERATE COMPLEXITY   CLINICAL DECISION MAKING:   Objective Measures: Assessed 5/26/17    Tool Used: Fibromyalgia Impact Questionnaire  Score:  Initial: 86.73    Interpretation of Score: <40 = Mild Fibromyalgia Syndrome   40-60 = Moderate Fibromyalgia Syndrome   >60-70 = Severe Fibromyalgia Syndrome  Score 0 1-18 19-37 38-57 58-77 78-96 97   Modifier CH CI CJ CK CL CM CN     Tool Used: Thompson Depression Index   Score:  Initial: 13    Interpretation of Score: 0-10 = ups and downs considered normal   11-16= mild mood disturbance   17-20= borderline clinical depression   21-30= moderate depression   31-40= severe depression   Over 40= extreme depression     Tool Used: Modified Fatigue Impact Scale  Score:  Initial: Total score: 68/84  Physical subscale score: 31  Cognitive subscale score: 29  Psychosocial subscale score: 8      Tool Used: 6-MINUTE WALK TEST  Score:  Initial: 420 feet    Interpretation of Score: Normal range varies but is approximately 6784-8926 Feet  Medical Necessity:   · Patient is expected to demonstrate progress in strength, coordination and functional technique to increase independence with pain free functional mobility and activities of daily living. · Patient demonstrates good rehab potential due to higher previous functional level. Reason for Services/Other Comments:  · Patient continues to require skilled intervention due to increased pain with functional mobility and decreased activity tolerance. Use of outcome tool(s) and clinical judgement create a POC that gives a: Questionable prediction of patient's progress: MODERATE COMPLEXITY            TREATMENT:     Aquatic Therapy (45 minutes): Aquatic treatment performed per flow grid for Decreased muscle strength, Decreased endurance and Decreased activity endurance.       Aquatic Exercise Log       Date  6-8-17 Date  6-15-17 Date  6/29/17 Date  7/6/17 Date     Activity/ Exercise Parameters Parameters Parameters Parameters Parameters   Walking forward 2 laps 2 laps 2 laps 4 laps    Walking backward 2 laps 2 laps 2 laps 4 laps    Walking sideways 2 laps 2 laps 2 laps 2 laps      Marching 2 laps 2 laps 2 laps 2 laps      Goose Step 2 laps 2 laps 2 laps 2 laps      Tip toes  2 laps 2 laps 2 laps      Heels  2 laps 2 laps 2 laps      Lunges        Side step squats  2 laps      LE Exercises noodle noodle Big noodle Big noodle      Hip Flex/Ext Marching x 15 B Marching x 20 B 20x B Marching  30x B      Hip Abd/Add X 15 B X 20 B 20x B 30x B      Hip IR/ER          Calf raises    Off step  20x B      Knee Flex          Squats    20x       Leg Circles Deeper water x 10 each way each leg Deeper water x 10 each way each leg        Step Ups   20x B 2nd step  20x B    UE Exercises noodle noodle Big noodle       Squeeze In X 15 B X 20 B 20x B       Push Down X 15 B X 20 B 10x B       Pull Down X 15 B  10x B       Bicep/Tricep        Rows/Press outs  Push ups x 20  Pushing noodle forward/  Backward fast      Chi Positions          Trunk Rotation        Deep H2O/ Noodles noodle noodle        Stabilization          Arms only Straddle - 2 laps Straddle - 2 laps Straddle 2 laps  frwrd/bkwrd Straddle 2 laps  frwd/bkwrd      Legs only Straddle - 2 laps  Both - 2 laps Straddle - 2 laps  Both - 2 laps Straddle 2 laps  frwd/bkwrd Straddle 2 laps  frwd/bkwrd    [employer] X 20  X 20  Noodle  2 min x2 Noodle  3 min      Scissors X 20  X 20   Combo x 20  Noodle  2 min x2 Noodle  3 min      Crab walk 2 laps 2 laps      Lower abdominal   work  Knees to chest x 10   Knees to chest then kick out and pull down  10x Knees to chest, straight out and pull together  15x B      Cardio          Jogging  Punching noodles down at sides while jogging - 3 minutes  Holding arm rings down while jogging  2 min Rings  3 minutes  jogging    Lap   Swimming          Stretches [x]  In [place]  []  Whirlpool* [x]  In [place]  []  Whirlpool* [x]  In Pool  []  Whirlpool* []  In Pool  []  Whirlpool* []  In Pool  []  Whirlpool*     Hamstrings X 3 B self X 3 B  x3 seated x3 seated      Heelcords X 3 B self X 3 B x3 seated x3 seated      Piriformis X 3 B self X 3 B x3 seated x3 seated      Neck                  * For increased soft tissue extensibility a warm water (over 100°F) environment was utilized. Supervision/monitoring was provided at all times. Pre-treatment pain - none noted  Treatment/Session Assessment:  Patient doing well and enjoying pool exercises with no complaints of pain. No fatigue noted. Naoma Broccoli Response to Treatment:  Oliver James tolerated increased difficulty well  No difficulties noted. Pain: Post Treatment Session: none noted  · Compliance with Program/Exercises: Will assess as treatment progresses. · Recommendations/Intent for next treatment session:  \"Next visit will focus on advancements to more challenging activities\".   Total Treatment Duration: 45 minutes   PT Patient Time In/Time Out  Time In: 1515  Time Out: [address], [facility]

## 2017-07-10 ENCOUNTER — HOSPITAL ENCOUNTER (OUTPATIENT)
Dept: PHYSICAL THERAPY | Age: 37
Discharge: HOME | End: 2017-07-10
Payer: COMMERCIAL

## 2017-07-10 PROCEDURE — 97110 THERAPEUTIC EXERCISES: CPT

## 2017-07-10 PROCEDURE — 97140 MANUAL THERAPY 1/> REGIONS: CPT

## 2017-07-10 NOTE — PROGRESS NOTES
Grace Median  : 1980 [facility] ADULT MENTAL HEALTH SERVICES  [address] [place] [address], [address] [facility]  [phone]   [phone]        OUTPATIENT PHYSICAL THERAPY:Progress Note f 7/10/2017    ICD-10: Treatment Diagnosis: fibromyalgia (M79.7)  Difficulty in walking, not elsewhere classified (R26.2)  Precautions/Allergies:   Excedrin p.m. [diphenhydramine-acetaminophen]; Excedrin [acetaminophen-caffeine]; and Pepto-bismol [bismuth subsalicylate]   Fall Risk Score: 0 (? 5 = High Risk)  MD Orders: Evaluate and treat MEDICAL/REFERRING DIAGNOSIS:  Fibromyalgia [M79.7]   DATE OF ONSET: 17  REFERRING PHYSICIAN: Carlos Roberson [address]: 17   Patient has attended 9 physical therapy sessions including initial evaluation. INITIAL ASSESSMENT:  Ms. Kamryn Georges presents with moderate self reported fibromyalgia symptoms and self reported ups and downs considered normal. She presents with moderate self reported fatigue and improving activity tolerance with functional mobility (6 minute walk test). She presents with postural deviations listed in detail below. She would benefit from skilled physical therapy in order to restore prior level of function and prevent future impairment. PROBLEM LIST (Impacting functional limitations):  1. Decreased Strength  2. Decreased ADL/Functional Activities  3. Decreased Transfer Abilities  4. Decreased Ambulation Ability/Technique  5. Decreased Balance  6. Increased Pain  7. Decreased Activity Tolerance  8. Decreased Pacing Skills  9. Decreased Work Simplification/Energy Conservation Techniques  10. Increased Fatigue  11. Decreased Flexibility/Joint Mobility  12. Decreased [place] with Home Exercise Program INTERVENTIONS PLANNED:  1. Balance Exercise  2. Bed Mobility  3. Cold  4. Family Education  5. Gait Training  6. Heat  7. Home Exercise Program (HEP)  8. Manual Therapy  9.  Neuromuscular Re-education/Strengthening  10. Range of Motion (ROM)  11. Therapeutic Activites  12. Therapeutic Exercise/Strengthening  13. Transfer Training  14. aquatic therapy   15. Postural training/ training   TREATMENT PLAN:  Effective Dates: 5/26/17 TO 8/26/17. Frequency/Duration: 2 times a week for 8 weeks  GOALS: (Goals have been discussed and agreed upon with patient.)  DISCHARGE GOALS: Time Frame: 8 weeks  Patient will be independent with home exercise program within 8 weeks in order to improve independence with management of patient's symptoms and/or functional deficits. (Demaris Levans 7/3/17)  Patient will improve their fibromyalgia impact scale score to less than 70 points within 8 weeks in order to improve activity tolerance and pain free independence with activities of daily living and functional mobility. (MET 7/3/17)  Patient will improve their physical subscale score for modified fatigue impact scale to 27 points within 8 weeks in order to improve activity tolerance. (CONTINUE 7/3/17)  Rehabilitation Potential For Stated Goals: Good            The information in this section was collected on 5/26/17 (except where otherwise noted). HISTORY:   History of Present Injury/Illness (Reason for Referral):  Gloria Morgan notes she is going to try physical therapy again \"even though I know it won't help. \" She notes she is looking to hire a  due to [employer]" with medical care and work related issues. She notes she has been on short term disability due to fatigue and increased pain. She plans to return to work 5/30/17. She notes fibromyalgia symptoms are worsening.    Past Medical History/Comorbidities: anemia, fibromyalgia, GERD, hypothyroidism, migraine, morbid obesity, oligomenorrhea, ovarian cyst, vitamin D deficiency, gastrectomy  Social History/Living Environment:     Gloria Morgan lives with her teenage daughter, no steps to enter house, one level home  Prior Level of Function/Work/Activity:  Scott Whitley works full time - plans to return to work 5/30/17. Her job requirements include prolonged sitting, computer, and phone use  Personal Factors:          Sex:  female        Age:  40 y.o. Current Medications:  Trisprintec, lyrica, zomig, cymbalta, metformin, synthroid, iron, valtrex, vitamin D3, multivitamin   Date Last Reviewed:  7/10/2017   Number of Personal Factors/Comorbidities that affect the Plan of Care: 1-2: MODERATE COMPLEXITY   EXAMINATION:   Observation/Orthostatic Postural Assessment:  Assessed 5/26/17 Scott Whitley sits with forward head and rounded shoulders which indicate tight anterior chest musculature, upper trapezius, and levator scapula and weak posterior scapula musculature and deep cervical flexors.  Scott Whitley stands with left iliac crest superior to right, left PSIS superior to right, left ASIS inferior to right  Palpation:  Assessed 5/26/17 Scott Whitley notes tenderness to palpate throughout body during assessment  ROM: Assessed  5/26/17  Selective functional movement assessment  Cervical flexion 75%  Cervical extension 90%  Cervical rotation/sidebend bilaterally 75%  appleys scratch test external rotation T4 - feels good  appleys scratch test internal rotation T7 - pain left anterior shoulder and upper arm   Multisegmental flexion 75% - pain right low back region  Multisegmental extension 75% - doesn't hurt  Multisegmental rotation 75% - pain with right rotation  Single leg stance - at least 3 seconds bilaterally   Strength: Assessed  5/26/17   4/5 all major muscle groups bilateral upper and lower extremities   Special Tests: Assessed 5/26/17  Slump test - negative bilaterally  Straight leg raise- negative bilaterally, tight hamstrings bilaterally   Neurological Screen: Assessed 5/26/17  Myotomes: strong and symmetrical bilateral upper and lower extremities   Dermatomes: tingling noted bilateral hands and feet  Functional Mobility: Assessed 5/26/17 Goran Ctoton notes she requires assistance with carrying groceries, she notes she ambulates with straight cane at times due to increased pain, she notes difficulty completing house chores without significant rest between tasks  Balance:  Assessed 5/26/17 see single leg stance above  Mental Status:  Assessed 5/26/17  Alert and oriented x 4   Body Structures Involved:  1. Nerves  2. Joints  3. Muscles  4. Ligaments Body Functions Affected:  1. Sensory/Pain  2. Neuromusculoskeletal  3. Movement Related  4. Skin Related Activities and Participation Affected:  1. General Tasks and Demands  2. Mobility  3. Self Care  4. Domestic Life  5. Interpersonal Interactions and Relationships  6.  Community, Social and [place]   Number of elements that affect the Plan of Care: 3: MODERATE COMPLEXITY   CLINICAL PRESENTATION:   Presentation: Evolving clinical presentation with changing clinical characteristics: MODERATE COMPLEXITY   CLINICAL DECISION MAKING:   Objective Measures: Assessed 7/3/17    Tool Used: Fibromyalgia Impact Questionnaire  Score:  Initial: 86.73 60.1   Interpretation of Score: <40 = Mild Fibromyalgia Syndrome   40-60 = Moderate Fibromyalgia Syndrome   >60-70 = Severe Fibromyalgia Syndrome  Score 0 1-18 19-37 38-57 58-77 78-96 97   Modifier CH CI CJ CK CL CM CN     Tool Used: Thompson Depression Index   Score:  Initial: 13 10   Interpretation of Score: 0-10 = ups and downs considered normal   11-16= mild mood disturbance   17-20= borderline clinical depression   21-30= moderate depression   31-40= severe depression   Over 40= extreme depression     Tool Used: Modified Fatigue Impact Scale  Score:  Initial: Total score: 68/84  Physical subscale score: 31  Cognitive subscale score: 29  Psychosocial subscale score: 8 Total score: 59  Physical subscale score: 28  Cognitive subscale score: 25  Psychosocial subscale score 6     Tool Used: 6-MINUTE WALK TEST  Score:  Initial: 420 feet 780 feet - no standing rests   Interpretation of Score: Normal range varies but is approximately 9862-5280 Feet  Medical Necessity:   · Patient is expected to demonstrate progress in strength, coordination and functional technique to increase independence with pain free functional mobility and activities of daily living. · Patient demonstrates good rehab potential due to higher previous functional level. Reason for Services/Other Comments:  · Patient continues to require skilled intervention due to increased pain with functional mobility and decreased activity tolerance. Use of outcome tool(s) and clinical judgement create a POC that gives a: Questionable prediction of patient's progress: MODERATE COMPLEXITY            TREATMENT:   (In addition to Assessment/Re-Assessment sessions the following treatments were rendered)  Pre-treatment Symptoms/Complaints:  Oliver Aquinoker notes 5/10 pain in back and left piriformis and hip area. Pain: Initial: 5/10         Therapeutic Exercise: ( 25 minutes):  Exercises per grid below to improve mobility, strength, balance and coordination. Required minimal visual and verbal cues to promote proper body alignment and promote proper body posture. Progressed complexity of movement as indicated.    Date:  6-5-17 Date:  6-12-17 Date:  6/19/17 6-26-17 Date  7/3/17 Date  7/10/17   Activity/Exercise Parameters Parameters Parameters      Nustep  Level 2  10 minutes Level 2  10 minutes  Level 3  10 minutes Physiostep   Level 2  10 minutes arms and legs  NuStep level 3 10 minutes arms and legs NuStep   Level 3   10 minutes  Arms and legs  With moist heat to back   Supine - hamstring stretch With strap   3 x 30 sec B With strap   3 x 30 sec B  3 sets   30 seconds bilaterally  With strap  3 x 30 sec B  With strap 3 x 30 second hold B    Piriformis stretch  3 x 30 sec B 3 x 30 sec B 30 second hold  3 repetitions  3 x 30 sec B and hip capsule stretch   3 x 30 sec B  3 x 30 second hold B    Pelvic tilt 10 x 5 sec  10 x 5 sec hold  5 second hold with addition of isometric hip flexion 10 repetitions bilaterally  10 x 5 sec      Lower trunk rotation  5 x 10 sec B 5 x 10 sec B  10 second hold   10 repetitions bilaterally 10 x 10 sec B  10 second 10 repetitions bilaterally  10 x 10 second hold bilaterally   Knee to chest 3 x 30 sec         Straight leg raise  2 x 10 B  2 x 10 B     Side lying -   Clams   Hip abduction     X 10 B  X 10 B    2 x 10 B  2 x 10 B 30 repetitions bilaterally  X 30 reps B each   rows  Yellow   x 10 B       Shoulder extension  Yellow   X 10 B       Posterior pelvic tilt (transverse abdominus contraction) with isometric hip flexion hold    5 second hold   10 repetitions bilaterally  10 x 5 sec hold B      Ambulation for improved activity tolerance      780 feet - in 6 minutes no standing rests               Manual Therapy (     15 minutes): Soft tissue mobilization with foam roller thoracic, lumbar and bilateral gluts for improved mobility and decreased pain     Therapeutic Modalities: (10 minutes) moist heat to back while on new step to help decrease tightness and after manual and exercises ice pack to bilateral low back and thoracic area to help decrease pain. Treatment/Session Assessment:    Response to Treatment:  Lane Rushing notes 3/10 pain in back following STM with theraroller and patient reported tightness decreased. Pain: Post Treatment Session: 3/10  · Compliance with Program/Exercises: Will assess as treatment progresses. · Recommendations/Intent for next treatment session: \"Next visit will focus on advancements to more challenging activities\".   Total Treatment Duration:    PT Patient Time In/Time Out  Time In: 1530  Time Out: Iris [address] [place] [address]

## 2017-07-13 ENCOUNTER — HOSPITAL ENCOUNTER (OUTPATIENT)
Dept: PHYSICAL THERAPY | Age: 37
Discharge: HOME | End: 2017-07-13
Payer: COMMERCIAL

## 2017-07-13 PROCEDURE — 97113 AQUATIC THERAPY/EXERCISES: CPT

## 2017-07-13 NOTE — PROGRESS NOTES
Aissatouguevara Sheriff  : 1980 [facility] at [place] [address] [address] [facility]  [phone]   [phone]        OUTPATIENT PHYSICAL THERAPY:Daily Note and Aquatic Note 2017    ICD-10: Treatment Diagnosis: fibromyalgia (M79.7)  Difficulty in walking, not elsewhere classified (R26.2)  Precautions/Allergies:   Excedrin p.m. [diphenhydramine-acetaminophen]; Excedrin [acetaminophen-caffeine]; and Pepto-bismol [bismuth subsalicylate]   Fall Risk Score: 0 (? 5 = High Risk)  MD Orders: Evaluate and treat MEDICAL/REFERRING DIAGNOSIS:  Fibromyalgia [M79.7]   DATE OF ONSET: 17  REFERRING PHYSICIAN: Rosanne Hansen, [address]: 17   Patient has attended 1 physical therapy session including initial evaluation. INITIAL ASSESSMENT:  Ms. Xochilt Gallegos presents with severe self reported fibromyalgia symptoms and self reported mild mood disturbances. She presents with severe self reported fatigue and decreased activity tolerance with functional mobility (6 minute walk test). She presents with postural deviations listed in detail below. She would benefit from skilled physical therapy in order to restore prior level of function and prevent future impairment. PROBLEM LIST (Impacting functional limitations):  1. Decreased Strength  2. Decreased ADL/Functional Activities  3. Decreased Transfer Abilities  4. Decreased Ambulation Ability/Technique  5. Decreased Balance  6. Increased Pain  7. Decreased Activity Tolerance  8. Decreased Pacing Skills  9. Decreased Work Simplification/Energy Conservation Techniques  10. Increased Fatigue  11. Decreased Flexibility/Joint Mobility  12. Decreased [place] with Home Exercise Program INTERVENTIONS PLANNED:  1. Balance Exercise  2. Bed Mobility  3. Cold  4. Family Education  5. Gait Training  6. Heat  7. Home Exercise Program (HEP)  8. Manual Therapy  9.  Neuromuscular Re-education/Strengthening  10. Range of Motion (ROM)  11. Therapeutic Activites  12. Therapeutic Exercise/Strengthening  13. Transfer Training  14. aquatic therapy   15. Postural training/ training   TREATMENT PLAN:  Effective Dates: 5/26/17 TO 8/26/17. Frequency/Duration: 2 times a week for 8 weeks  GOALS: (Goals have been discussed and agreed upon with patient.)  DISCHARGE GOALS: Time Frame: 8 weeks  Patient will be independent with home exercise program within 8 weeks in order to improve independence with management of patient's symptoms and/or functional deficits. Patient will improve their fibromyalgia impact scale score to less than 70 points within 8 weeks in order to improve activity tolerance and pain free independence with activities of daily living and functional mobility. Patient will improve their physical subscale score for modified fatigue impact scale to 27 points within 8 weeks in order to improve activity tolerance. Rehabilitation Potential For Stated Goals: Good  Regarding Corlis Lolling therapy, I certify that the treatment plan above will be carried out by a therapist or under their direction. Thank you for this referral,  Reanna Rangel PTA     Referring Physician Signature: Ciara Olvera, *              Date                    The information in this section was collected on 5/26/17 (except where otherwise noted). HISTORY:   History of Present Injury/Illness (Reason for Referral):  Goran Cotton notes she is going to try physical therapy again \"even though I know it won't help. \" She notes she is looking to hire a  due to [employer]" with medical care and work related issues. She notes she has been on short term disability due to fatigue and increased pain. She plans to return to work 5/30/17. She notes fibromyalgia symptoms are worsening.    Past Medical History/Comorbidities: anemia, fibromyalgia, GERD, hypothyroidism, migraine, morbid obesity, oligomenorrhea, ovarian cyst, vitamin D deficiency, gastrectomy  Social History/Living Environment:     Denilson Mills lives with her teenage daughter, no steps to enter house, one level home  Prior Level of Function/Work/Activity:  Denilson Mills works full time - plans to return to work 5/30/17. Her job requirements include prolonged sitting, computer, and phone use  Personal Factors:          Sex:  female        Age:  40 y.o. Current Medications:  Trisprintec, lyrica, zomig, cymbalta, metformin, synthroid, iron, valtrex, vitamin D3, multivitamin   Date Last Reviewed:  7/13/2017   Number of Personal Factors/Comorbidities that affect the Plan of Care: 1-2: MODERATE COMPLEXITY   EXAMINATION:   Observation/Orthostatic Postural Assessment:  Assessed 5/26/17 Denilson Mills sits with forward head and rounded shoulders which indicate tight anterior chest musculature, upper trapezius, and levator scapula and weak posterior scapula musculature and deep cervical flexors.  Denilson Mills stands with left iliac crest superior to right, left PSIS superior to right, left ASIS inferior to right  Palpation:  Assessed 5/26/17 Denilson Mills notes tenderness to palpate throughout body during assessment  ROM: Assessed  5/26/17  Selective functional movement assessment  Cervical flexion 75%  Cervical extension 90%  Cervical rotation/sidebend bilaterally 75%  appleys scratch test external rotation T4 - feels good  appleys scratch test internal rotation T7 - pain left anterior shoulder and upper arm   Multisegmental flexion 75% - pain right low back region  Multisegmental extension 75% - doesn't hurt  Multisegmental rotation 75% - pain with right rotation  Single leg stance - at least 3 seconds bilaterally   Strength: Assessed  5/26/17   4/5 all major muscle groups bilateral upper and lower extremities   Special Tests: Assessed 5/26/17  Slump test - negative bilaterally  Straight leg raise- negative bilaterally, tight hamstrings bilaterally   Neurological Screen: Assessed 5/26/17  Myotomes: strong and symmetrical bilateral upper and lower extremities   Dermatomes: tingling noted bilateral hands and feet  Functional Mobility:  Assessed 5/26/17 Chriss Knox notes she requires assistance with carrying groceries, she notes she ambulates with straight cane at times due to increased pain, she notes difficulty completing house chores without significant rest between tasks  Balance:  Assessed 5/26/17 see single leg stance above  Mental Status:  Assessed 5/26/17  Alert and oriented x 4   Body Structures Involved:  1. Nerves  2. Joints  3. Muscles  4. Ligaments Body Functions Affected:  1. Sensory/Pain  2. Neuromusculoskeletal  3. Movement Related  4. Skin Related Activities and Participation Affected:  1. General Tasks and Demands  2. Mobility  3. Self Care  4. Domestic Life  5. Interpersonal Interactions and Relationships  6.  Community, Social and [place]   Number of elements that affect the Plan of Care: 3: MODERATE COMPLEXITY   CLINICAL PRESENTATION:   Presentation: Evolving clinical presentation with changing clinical characteristics: MODERATE COMPLEXITY   CLINICAL DECISION MAKING:   Objective Measures: Assessed 5/26/17    Tool Used: Fibromyalgia Impact Questionnaire  Score:  Initial: 86.73    Interpretation of Score: <40 = Mild Fibromyalgia Syndrome   40-60 = Moderate Fibromyalgia Syndrome   >60-70 = Severe Fibromyalgia Syndrome  Score 0 1-18 19-37 38-57 58-77 78-96 97   Modifier CH CI CJ CK CL CM CN     Tool Used: Thompson Depression Index   Score:  Initial: 13    Interpretation of Score: 0-10 = ups and downs considered normal   11-16= mild mood disturbance   17-20= borderline clinical depression   21-30= moderate depression   31-40= severe depression   Over 40= extreme depression     Tool Used: Modified Fatigue Impact Scale  Score:  Initial: Total score: 68/84  Physical subscale score: 31  Cognitive subscale score: 29  Psychosocial subscale score: 8      Tool Used: 6-MINUTE WALK TEST  Score:  Initial: 420 feet    Interpretation of Score: Normal range varies but is approximately 2735-3997 Feet  Medical Necessity:   · Patient is expected to demonstrate progress in strength, coordination and functional technique to increase independence with pain free functional mobility and activities of daily living. · Patient demonstrates good rehab potential due to higher previous functional level. Reason for Services/Other Comments:  · Patient continues to require skilled intervention due to increased pain with functional mobility and decreased activity tolerance. Use of outcome tool(s) and clinical judgement create a POC that gives a: Questionable prediction of patient's progress: MODERATE COMPLEXITY            TREATMENT:     Aquatic Therapy (45 minutes): Aquatic treatment performed per flow grid for Decreased muscle strength, Decreased endurance and Decreased activity endurance.       Aquatic Exercise Log       Date  6-8-17 Date  6-15-17 Date  6/29/17 Date  7/6/17 Date  7/13/17   Activity/ Exercise Parameters Parameters Parameters Parameters Parameters   Walking forward 2 laps 2 laps 2 laps 4 laps 4 laps   Walking backward 2 laps 2 laps 2 laps 4 laps 4 laps   Walking sideways 2 laps 2 laps 2 laps 2 laps 2 laps     Marching 2 laps 2 laps 2 laps 2 laps 2 laps     Goose Step 2 laps 2 laps 2 laps 2 laps 2 laps     Tip toes  2 laps 2 laps 2 laps 2 laps     Heels  2 laps 2 laps 2 laps 2 laps     Lunges        Side step squats  2 laps      LE Exercises noodle noodle Big noodle Big noodle noodle     Hip Flex/Ext Marching x 15 B Marching x 20 B 20x B Marching  30x B 30x B     Hip Abd/Add X 15 B X 20 B 20x B 30x B 30x B     Hip IR/ER          Calf raises    Off step  20x B      Knee Flex          Squats    20x  20x     Leg Circles Deeper water x 10 each way each leg Deeper water x 10 each way each leg   1 min  Each  Leg both directions     Step Ups   20x B 2nd step  20x B 2nd step  30x B   UE Exercises noodle noodle Big noodle  Small noodle     Squeeze In X 15 B X 20 B 20x B  20x B     Push Down X 15 B X 20 B 10x B  20x B     Pull Down X 15 B  10x B  20x B     Bicep/Tricep        Rows/Press outs  Push ups x 20  Pushing noodle forward/  Backward fast  Pushing noodle  In and out  3 min    Chi Positions          Trunk Rotation     Standing with noodle   Deep H2O/ Noodles noodle noodle   noodle     Stabilization          Arms only Straddle - 2 laps Straddle - 2 laps Straddle 2 laps  frwrd/bkwrd Straddle 2 laps  frwd/bkwrd Straddle  Laps  frwrd/bkwrd     Legs only Straddle - 2 laps  Both - 2 laps Straddle - 2 laps  Both - 2 laps Straddle 2 laps  frwd/bkwrd Straddle 2 laps  frwd/bkwrd starddle 2  Laps  frwrd/bkwrd   [employer] X 20  X 20  Noodle  2 min x2 Noodle  3 min Noodle  3 min     Scissors X 20  X 20   Combo x 20  Noodle  2 min x2 Noodle  3 min Noodle  3 min     Crab walk 2 laps 2 laps      Lower abdominal   work  Knees to chest x 10   Knees to chest then kick out and pull down  10x Knees to chest, straight out and pull together  15x B Knees to chest, straight out and pull together  20x B     Cardio          Jogging  Punching noodles down at sides while jogging - 3 minutes  Holding arm rings down while jogging  2 min Rings  3 minutes  jogging    Lap   Swimming          Stretches [x]  In [place]  []  Whirlpool* [x]  In Pool  []  Whirlpool* [x]  In Pool  []  Whirlpool* [x]  In Pool  []  Whirlpool* [x]  In Pool  []  Whirlpool*     Hamstrings X 3 B self X 3 B  x3 seated x3 seated x3 seated     Heelcords X 3 B self X 3 B x3 seated x3 seated x3 seated     Piriformis X 3 B self X 3 B x3 seated x3 seated x3 seated     Neck                  * For increased soft tissue extensibility a warm water (over 100°F) environment was utilized. Supervision/monitoring was provided at all times.             Pre-treatment pain - Patient is not feeling well today but participated well. Treatment/Session Assessment: . Response to Treatment:  Lane Rushing tolerated well considering her increased pain today  Pain: Post Treatment Session: none noted  · Compliance with Program/Exercises: Will assess as treatment progresses. · Recommendations/Intent for next treatment session: \"Next visit will focus on advancements to more challenging activities\".   Total Treatment Duration: 45 minutes   PT Patient Time In/Time Out  Time In: 1515  Time Out: [address], [facility]

## 2017-07-17 ENCOUNTER — HOSPITAL ENCOUNTER (OUTPATIENT)
Dept: PHYSICAL THERAPY | Age: 37
Discharge: HOME | End: 2017-07-17
Payer: COMMERCIAL

## 2017-07-17 PROCEDURE — 97110 THERAPEUTIC EXERCISES: CPT

## 2017-07-17 PROCEDURE — 97140 MANUAL THERAPY 1/> REGIONS: CPT

## 2017-07-17 NOTE — PROGRESS NOTES
Anthony Ospina  : 1980 [facility] at [address], [facility]  [phone]   [phone]        OUTPATIENT PHYSICAL THERAPY: Daily Note f 2017    ICD-10: Treatment Diagnosis: fibromyalgia (M79.7)  Difficulty in walking, not elsewhere classified (R26.2)  Precautions/Allergies:   Excedrin p.m. [diphenhydramine-acetaminophen]; Excedrin [acetaminophen-caffeine]; and Pepto-bismol [bismuth subsalicylate]   Fall Risk Score: 0 (? 5 = High Risk)  MD Orders: Evaluate and treat MEDICAL/REFERRING DIAGNOSIS:  Fibromyalgia [M79.7]   DATE OF ONSET: 17  REFERRING PHYSICIAN: Carlos Roberson [address]: 17   Patient has attended 13 physical therapy sessions including initial evaluation. INITIAL ASSESSMENT:  Ms. Melanie Monterroso presents with moderate self reported fibromyalgia symptoms and self reported ups and downs considered normal. She presents with moderate self reported fatigue and improving activity tolerance with functional mobility (6 minute walk test). She presents with postural deviations listed in detail below. She would benefit from skilled physical therapy in order to restore prior level of function and prevent future impairment. PROBLEM LIST (Impacting functional limitations):  1. Decreased Strength  2. Decreased ADL/Functional Activities  3. Decreased Transfer Abilities  4. Decreased Ambulation Ability/Technique  5. Decreased Balance  6. Increased Pain  7. Decreased Activity Tolerance  8. Decreased Pacing Skills  9. Decreased Work Simplification/Energy Conservation Techniques  10. Increased Fatigue  11. Decreased Flexibility/Joint Mobility  12. Decreased [place] with Home Exercise Program INTERVENTIONS PLANNED:  1. Balance Exercise  2. Bed Mobility  3. Cold  4. Family Education  5. Gait Training  6. Heat  7. Home Exercise Program (HEP)  8. Manual Therapy  9.  Neuromuscular Re-education/Strengthening  10. Range of Motion (ROM)  11. Therapeutic Activites  12. Therapeutic Exercise/Strengthening  13. Transfer Training  14. aquatic therapy   15. Postural training/ training   TREATMENT PLAN:  Effective Dates: 5/26/17 TO 8/26/17. Frequency/Duration: 2 times a week for 8 weeks  GOALS: (Goals have been discussed and agreed upon with patient.)  DISCHARGE GOALS: Time Frame: 8 weeks  Patient will be independent with home exercise program within 8 weeks in order to improve independence with management of patient's symptoms and/or functional deficits. (Crisostomo Lecher 7/3/17)  Patient will improve their fibromyalgia impact scale score to less than 70 points within 8 weeks in order to improve activity tolerance and pain free independence with activities of daily living and functional mobility. (MET 7/3/17)  Patient will improve their physical subscale score for modified fatigue impact scale to 27 points within 8 weeks in order to improve activity tolerance. (CONTINUE 7/3/17)  Rehabilitation Potential For Stated Goals: Good            The information in this section was collected on 5/26/17 (except where otherwise noted). HISTORY:   History of Present Injury/Illness (Reason for Referral):  Martinez Crouch notes she is going to try physical therapy again \"even though I know it won't help. \" She notes she is looking to hire a  due to [employer]" with medical care and work related issues. She notes she has been on short term disability due to fatigue and increased pain. She plans to return to work 5/30/17. She notes fibromyalgia symptoms are worsening.    Past Medical History/Comorbidities: anemia, fibromyalgia, GERD, hypothyroidism, migraine, morbid obesity, oligomenorrhea, ovarian cyst, vitamin D deficiency, gastrectomy  Social History/Living Environment:     Martinez Crouch lives with her teenage daughter, no steps to enter house, one level home  Prior Level of Function/Work/Activity:  Inocencio Fuentes works full time - plans to return to work 5/30/17. Her job requirements include prolonged sitting, computer, and phone use  Personal Factors:          Sex:  female        Age:  40 y.o. Current Medications:  Trisprintec, lyrica, zomig, cymbalta, metformin, synthroid, iron, valtrex, vitamin D3, multivitamin   Date Last Reviewed:  7/18/2017   Number of Personal Factors/Comorbidities that affect the Plan of Care: 1-2: MODERATE COMPLEXITY   EXAMINATION:   Observation/Orthostatic Postural Assessment:  Assessed 5/26/17 Inocencio Fuentes sits with forward head and rounded shoulders which indicate tight anterior chest musculature, upper trapezius, and levator scapula and weak posterior scapula musculature and deep cervical flexors.  Inocencio Fuentes stands with left iliac crest superior to right, left PSIS superior to right, left ASIS inferior to right  Palpation:  Assessed 5/26/17 Inocencio Fuentes notes tenderness to palpate throughout body during assessment  ROM: Assessed  5/26/17  Selective functional movement assessment  Cervical flexion 75%  Cervical extension 90%  Cervical rotation/sidebend bilaterally 75%  appleys scratch test external rotation T4 - feels good  appleys scratch test internal rotation T7 - pain left anterior shoulder and upper arm   Multisegmental flexion 75% - pain right low back region  Multisegmental extension 75% - doesn't hurt  Multisegmental rotation 75% - pain with right rotation  Single leg stance - at least 3 seconds bilaterally   Strength: Assessed  5/26/17   4/5 all major muscle groups bilateral upper and lower extremities   Special Tests: Assessed 5/26/17  Slump test - negative bilaterally  Straight leg raise- negative bilaterally, tight hamstrings bilaterally   Neurological Screen: Assessed 5/26/17  Myotomes: strong and symmetrical bilateral upper and lower extremities   Dermatomes: tingling noted bilateral hands and feet  Functional Mobility: Assessed 5/26/17 Maria R Cords notes she requires assistance with carrying groceries, she notes she ambulates with straight cane at times due to increased pain, she notes difficulty completing house chores without significant rest between tasks  Balance:  Assessed 5/26/17 see single leg stance above  Mental Status:  Assessed 5/26/17  Alert and oriented x 4   Body Structures Involved:  1. Nerves  2. Joints  3. Muscles  4. Ligaments Body Functions Affected:  1. Sensory/Pain  2. Neuromusculoskeletal  3. Movement Related  4. Skin Related Activities and Participation Affected:  1. General Tasks and Demands  2. Mobility  3. Self Care  4. Domestic Life  5. Interpersonal Interactions and Relationships  6.  Community, Social and [place]   Number of elements that affect the Plan of Care: 3: MODERATE COMPLEXITY   CLINICAL PRESENTATION:   Presentation: Evolving clinical presentation with changing clinical characteristics: MODERATE COMPLEXITY   CLINICAL DECISION MAKING:   Objective Measures: Assessed 7/3/17    Tool Used: Fibromyalgia Impact Questionnaire  Score:  Initial: 86.73 60.1   Interpretation of Score: <40 = Mild Fibromyalgia Syndrome   40-60 = Moderate Fibromyalgia Syndrome   >60-70 = Severe Fibromyalgia Syndrome  Score 0 1-18 19-37 38-57 58-77 78-96 97   Modifier CH CI CJ CK CL CM CN     Tool Used: Thompson Depression Index   Score:  Initial: 13 10   Interpretation of Score: 0-10 = ups and downs considered normal   11-16= mild mood disturbance   17-20= borderline clinical depression   21-30= moderate depression   31-40= severe depression   Over 40= extreme depression     Tool Used: Modified Fatigue Impact Scale  Score:  Initial: Total score: 68/84  Physical subscale score: 31  Cognitive subscale score: 29  Psychosocial subscale score: 8 Total score: 59  Physical subscale score: 28  Cognitive subscale score: 25  Psychosocial subscale score 6     Tool Used: 6-MINUTE WALK TEST  Score:  Initial: 420 feet 780 feet - no standing rests   Interpretation of Score: Normal range varies but is approximately 1091-3509 Feet  Medical Necessity:   · Patient is expected to demonstrate progress in strength, coordination and functional technique to increase independence with pain free functional mobility and activities of daily living. · Patient demonstrates good rehab potential due to higher previous functional level. Reason for Services/Other Comments:  · Patient continues to require skilled intervention due to increased pain with functional mobility and decreased activity tolerance. Use of outcome tool(s) and clinical judgement create a POC that gives a: Questionable prediction of patient's progress: MODERATE COMPLEXITY            TREATMENT:   (In addition to Assessment/Re-Assessment sessions the following treatments were rendered)  Pre-treatment Symptoms/Complaints:  Nelli Irene notes 6/10 pain in back - she notes difficulty sleeping   Pain: Initial: 6/10  Pain Intensity 1: 6  Pain Location 1: Back  Pain Orientation 1: Lower  Pain Intervention(s) 1: Exercise, Therapeutic touch      Therapeutic Exercise: (  26 minutes):  Exercises per grid below to improve mobility, strength, balance and coordination. Required minimal visual and verbal cues to promote proper body alignment and promote proper body posture. Progressed complexity of movement as indicated.    Date  7/3/17 Date  7/10/17 Date  7/17/17   Activity/Exercise      Nustep  NuStep level 3 10 minutes arms and legs NuStep   Level 3   10 minutes  Arms and legs  With moist heat to back NuStep level 3 10 minutes arms and legs    Supine - hamstring stretch  With strap 3 x 30 second hold B     Piriformis stretch   3 x 30 second hold B     Pelvic tilt       Lower trunk rotation  10 second 10 repetitions bilaterally  10 x 10 second hold bilaterally    Knee to chest      Straight leg raise      Side lying -   Clams   Hip abduction  30 repetitions bilaterally  X 30 reps B each    rows Shoulder extension      Posterior pelvic tilt (transverse abdominus contraction) with isometric hip flexion hold       Ambulation for improved activity tolerance  780 feet - in 6 minutes no standing rests             Manual Therapy (      13 minutes): Soft tissue mobilization with foam roller thoracic, lumbar and bilateral gluts for improved mobility and decreased pain     Therapeutic Modalities:                                                                                                  Treatment/Session Assessment:    Response to Treatment:  Denilson Mills notes 5/10 pain in low back   Pain: Post Treatment Session: 5/10  · Compliance with Program/Exercises: Will assess as treatment progresses. · Recommendations/Intent for next treatment session: \"Next visit will focus on advancements to more challenging activities\".   Total Treatment Duration:    PT Patient Time In/Time Out  Time In: 1530  Time Out: [facility],

## 2017-07-20 ENCOUNTER — HOSPITAL ENCOUNTER (OUTPATIENT)
Dept: PHYSICAL THERAPY | Age: 37
Discharge: HOME | End: 2017-07-20
Payer: COMMERCIAL

## 2017-07-20 ENCOUNTER — APPOINTMENT (OUTPATIENT)
Dept: PHYSICAL THERAPY | Age: 37
End: 2017-07-20
Payer: COMMERCIAL

## 2017-07-20 NOTE — PROGRESS NOTES
Patient called in the AM to cancel her scheduled PM aquatic therapy appointment.     Sari Montes De Oca PTA

## 2017-07-24 ENCOUNTER — HOSPITAL ENCOUNTER (OUTPATIENT)
Dept: PHYSICAL THERAPY | Age: 37
Discharge: HOME | End: 2017-07-24
Payer: COMMERCIAL

## 2017-07-25 NOTE — PROGRESS NOTES
Rehana Do  : 1980 [facility] at [address], [address]  [phone]   [phone]        OUTPATIENT DAILY NOTE    NAME/AGE/GENDER: Rehana Do is a 40 y.o. female. DATE: 2017    Patient canceled physical therapy appointment today due to therapist out with sick child. Will plan to follow up on next scheduled visit.     Elvira Kelly, PT

## 2017-07-27 ENCOUNTER — HOSPITAL ENCOUNTER (OUTPATIENT)
Dept: PHYSICAL THERAPY | Age: 37
Discharge: HOME | End: 2017-07-27
Payer: COMMERCIAL

## 2017-07-27 NOTE — PROGRESS NOTES
Jen Neves  : 1980 [facility] at [address] [place] [address], [address] [facility], [address]  [phone]   [phone]        OUTPATIENT AQUATIC DAILY NOTE    NAME/AGE/GENDER: Jen Neves is a 40 y.o. female. DATE: 2017    Patient canceled aquatic therapy appointment today due to forgetting her bathing suit. This is her second visit in a row she has cancelled at the pool. Will plan to follow up on next scheduled visit.     Lien Holliday, PTA

## 2017-08-07 ENCOUNTER — HOSPITAL ENCOUNTER (OUTPATIENT)
Dept: PHYSICAL THERAPY | Age: 37
Discharge: HOME | End: 2017-08-07
Payer: COMMERCIAL

## 2017-08-07 PROCEDURE — 97110 THERAPEUTIC EXERCISES: CPT

## 2017-08-07 NOTE — PROGRESS NOTES
Kathy Barrier  : 1980 [facility] at [place] [address] [address] [facility]  [phone]   [phone]        OUTPATIENT PHYSICAL THERAPY: Discharge Summary 2017    ICD-10: Treatment Diagnosis: fibromyalgia (M79.7)  Difficulty in walking, not elsewhere classified (R26.2)  Precautions/Allergies:   Excedrin p.m. [diphenhydramine-acetaminophen]; Excedrin [acetaminophen-caffeine]; and Pepto-bismol [bismuth subsalicylate]   Fall Risk Score: 0 (? 5 = High Risk)  MD Orders: Evaluate and treat MEDICAL/REFERRING DIAGNOSIS:  Fibromyalgia [M79.7]   DATE OF ONSET: 17  REFERRING PHYSICIAN: Carlos Roberson [address]: 17   Patient has attended 14 physical therapy sessions including initial evaluation. INITIAL ASSESSMENT:  Ms. Juan Blount presents with moderate self reported fibromyalgia symptoms and self reported ups and downs considered normal. She presents with moderate self reported fatigue and improving activity tolerance with functional mobility (6 minute walk test). She presents with postural deviations listed in detail below. She is to continue with home exercise program at this time. PROBLEM LIST (Impacting functional limitations):  1. Decreased Strength  2. Decreased ADL/Functional Activities  3. Decreased Transfer Abilities  4. Decreased Ambulation Ability/Technique  5. Decreased Balance  6. Increased Pain  7. Decreased Activity Tolerance  8. Decreased Pacing Skills  9. Decreased Work Simplification/Energy Conservation Techniques  10. Increased Fatigue  11. Decreased Flexibility/Joint Mobility  12. Decreased [place] with Home Exercise Program INTERVENTIONS PLANNED:  1. Balance Exercise  2. Bed Mobility  3. Cold  4. Family Education  5. Gait Training  6. Heat  7. Home Exercise Program (HEP)  8. Manual Therapy  9. Neuromuscular Re-education/Strengthening  10. Range of Motion (ROM)  11.  Therapeutic Activites  12. Therapeutic Exercise/Strengthening  13. Transfer Training  14. aquatic therapy   15. Postural training/ training   TREATMENT PLAN:  Effective Dates: 5/26/17 TO 8/26/17. Frequency/Duration:   GOALS: (Goals have been discussed and agreed upon with patient.)  DISCHARGE GOALS: Time Frame: 8 weeks  Patient will be independent with home exercise program within 8 weeks in order to improve independence with management of patient's symptoms and/or functional deficits. (Latonia Claytonian 7/3/17) (MET 8/7/17)  Patient will improve their fibromyalgia impact scale score to less than 70 points within 8 weeks in order to improve activity tolerance and pain free independence with activities of daily living and functional mobility. (MET 7/3/17) (MET 8/7/17)  Patient will improve their physical subscale score for modified fatigue impact scale to 27 points within 8 weeks in order to improve activity tolerance. (CONTINUE 7/3/17) (MET 8/7/17)  Rehabilitation Potential For Stated Goals: Good            The information in this section was collected on 5/26/17 (except where otherwise noted). HISTORY:   History of Present Injury/Illness (Reason for Referral):  Yarely Irwin notes she is going to try physical therapy again \"even though I know it won't help. \" She notes she is looking to hire a  due to [employer]" with medical care and work related issues. She notes she has been on short term disability due to fatigue and increased pain. She plans to return to work 5/30/17. She notes fibromyalgia symptoms are worsening.    Past Medical History/Comorbidities: anemia, fibromyalgia, GERD, hypothyroidism, migraine, morbid obesity, oligomenorrhea, ovarian cyst, vitamin D deficiency, gastrectomy  Social History/Living Environment:     Yarely Irwin lives with her teenage daughter, no steps to enter house, one level home  Prior Level of Function/Work/Activity:  Yarely Irwin works full time - plans to return to work 5/30/17. Her job requirements include prolonged sitting, computer, and phone use  Personal Factors:          Sex:  female        Age:  40 y.o. Current Medications:  Trisprintec, lyrica, zomig, cymbalta, metformin, synthroid, iron, valtrex, vitamin D3, multivitamin   Date Last Reviewed:  8/8/2017   Number of Personal Factors/Comorbidities that affect the Plan of Care: 1-2: MODERATE COMPLEXITY   EXAMINATION:   Observation/Orthostatic Postural Assessment:  Assessed 5/26/17 Goran Cotton sits with forward head and rounded shoulders which indicate tight anterior chest musculature, upper trapezius, and levator scapula and weak posterior scapula musculature and deep cervical flexors.  Goran Cotton stands with left iliac crest superior to right, left PSIS superior to right, left ASIS inferior to right  Palpation:  Assessed 5/26/17 Goran Cotton notes tenderness to palpate throughout body during assessment  ROM: Assessed  8/7/17  Selective functional movement assessment  Cervical flexion 75%  Cervical extension 90%  Cervical rotation/sidebend bilaterally 75%  appleys scratch test external rotation T4 - feels good  appleys scratch test internal rotation T7 - pain left anterior shoulder and upper arm   Multisegmental flexion 75% - pain right low back region  Multisegmental extension 75% - doesn't hurt  Multisegmental rotation 75% - pain with right rotation  Single leg stance - at least 3 seconds bilaterally   Strength: Assessed  5/26/17   4/5 all major muscle groups bilateral upper and lower extremities   Special Tests: Assessed 5/26/17  Slump test - negative bilaterally  Straight leg raise- negative bilaterally, tight hamstrings bilaterally   Neurological Screen: Assessed 5/26/17  Myotomes: strong and symmetrical bilateral upper and lower extremities   Dermatomes: tingling noted bilateral hands and feet  Functional Mobility:  Assessed 8/7/17 Goran Parrason notes she requires assistance with carrying groceries, she notes she ambulates with straight cane at times due to increased pain, she notes difficulty completing house chores without significant rest between tasks  Balance:  Assessed 5/26/17 see single leg stance above  Mental Status:  Assessed 5/26/17  Alert and oriented x 4   Body Structures Involved:  1. Nerves  2. Joints  3. Muscles  4. Ligaments Body Functions Affected:  1. Sensory/Pain  2. Neuromusculoskeletal  3. Movement Related  4. Skin Related Activities and Participation Affected:  1. General Tasks and Demands  2. Mobility  3. Self Care  4. Domestic Life  5. Interpersonal Interactions and Relationships  6.  Community, Social and [place]   Number of elements that affect the Plan of Care: 3: MODERATE COMPLEXITY   CLINICAL PRESENTATION:   Presentation: Evolving clinical presentation with changing clinical characteristics: MODERATE COMPLEXITY   CLINICAL DECISION MAKING:   Objective Measures: Assessed 8/7/17    Tool Used: Fibromyalgia Impact Questionnaire  Score:  Initial: 86.73 60.1 63.9   Interpretation of Score: <40 = Mild Fibromyalgia Syndrome   40-60 = Moderate Fibromyalgia Syndrome   >60-70 = Severe Fibromyalgia Syndrome  Score 0 1-18 19-37 38-57 58-77 78-96 97   Modifier CH CI CJ CK CL CM CN     Tool Used: Thompson Depression Index   Score:  Initial: 13 10 13   Interpretation of Score: 0-10 = ups and downs considered normal   11-16= mild mood disturbance   17-20= borderline clinical depression   21-30= moderate depression   31-40= severe depression   Over 40= extreme depression     Tool Used: Modified Fatigue Impact Scale  Score:  Initial: Total score: 68/84  Physical subscale score: 31  Cognitive subscale score: 29  Psychosocial subscale score: 8 Total score: 59  Physical subscale score: 28  Cognitive subscale score: 25  Psychosocial subscale score 6 Total score: 60/84  Physical subscale score; 26  Cognitive subscale score: 26  Psychosocial subscale score: 8     Tool Used: 6-MINUTE WALK TEST  Score:  Initial: 420 feet 780 feet - no standing rests 300 feet   Interpretation of Score: Normal range varies but is approximately 4312-6967 Feet  ·    Use of outcome tool(s) and clinical judgement create a POC that gives a: Questionable prediction of patient's progress: MODERATE COMPLEXITY            TREATMENT:   (In addition to Assessment/Re-Assessment sessions the following treatments were rendered)  Pre-treatment Symptoms/Complaints:  Fernando Zurita notes 8/10 pain in whole body - she notes frequent flare ups since she last came to therapy  Pain: Initial: 6/10  Pain Intensity 1: 8  Pain Location 1:  (whole body)      Therapeutic Exercise: ( 9 minutes):  Exercises per grid below to improve mobility, strength, balance and coordination. Required minimal visual and verbal cues to promote proper body alignment and promote proper body posture. Progressed complexity of movement as indicated.    Date  7/3/17 Date  7/10/17 Date  7/17/17 Date  8/7/17   Activity/Exercise       Nustep  NuStep level 3 10 minutes arms and legs NuStep   Level 3   10 minutes  Arms and legs  With moist heat to back NuStep level 3 10 minutes arms and legs     Supine - hamstring stretch  With strap 3 x 30 second hold B      Piriformis stretch   3 x 30 second hold B   In sitting 3 sets 30 seconds bilaterally    Pelvic tilt        Lower trunk rotation  10 second 10 repetitions bilaterally  10 x 10 second hold bilaterally     Knee to chest       Straight leg raise       Side lying -   Clams   Hip abduction  30 repetitions bilaterally  X 30 reps B each     rows       Shoulder extension       Posterior pelvic tilt (transverse abdominus contraction) with isometric hip flexion hold        Ambulation for improved activity tolerance  780 feet - in 6 minutes no standing rests   300 feet in 6 minutes with 4 standing rests              Therapeutic Modalities: Treatment/Session Assessment:    Response to Treatment:  Adry Beltran notes no change in pain level.    Pain: Post Treatment Session: 8/10  · Compliance with Program/Exercises: compliant with home exercise program   Total Treatment Duration:    PT Patient Time In/Time Out  Time In: 1540  Time Out: [address],

## 2017-08-10 ENCOUNTER — HOSPITAL ENCOUNTER (OUTPATIENT)
Dept: PHYSICAL THERAPY | Age: 37
End: 2017-08-10
Payer: COMMERCIAL

## 2017-08-17 ENCOUNTER — APPOINTMENT (OUTPATIENT)
Dept: PHYSICAL THERAPY | Age: 37
End: 2017-08-17
Payer: COMMERCIAL

## 2017-08-25 PROBLEM — R53.82 CHRONIC FATIGUE: Status: ACTIVE | Noted: 2017-08-25

## 2018-12-06 ENCOUNTER — HOSPITAL ENCOUNTER (OUTPATIENT)
Dept: LAB | Age: 38
Discharge: HOME | End: 2018-12-06
Payer: COMMERCIAL

## 2018-12-06 DIAGNOSIS — D64.9 ANEMIA, UNSPECIFIED TYPE: ICD-10-CM

## 2018-12-06 PROBLEM — E53.8 FOLIC ACID DEFICIENCY: Status: ACTIVE | Noted: 2018-12-06

## 2018-12-06 PROBLEM — R53.83 FATIGUE: Status: ACTIVE | Noted: 2017-08-25

## 2018-12-06 PROBLEM — Z98.84 H/O GASTRIC BYPASS: Status: ACTIVE | Noted: 2018-12-06

## 2018-12-06 LAB
ALBUMIN SERPL-MCNC: 3.4 G/DL (ref 3.5–5)
ALBUMIN/GLOB SERPL: 0.8 {RATIO} (ref 1.2–3.5)
ALP SERPL-CCNC: 83 U/L (ref 50–136)
ALT SERPL-CCNC: 16 U/L (ref 12–65)
ANION GAP SERPL CALC-SCNC: 5 MMOL/L (ref 7–16)
APPEARANCE UR: CLEAR
AST SERPL-CCNC: 10 U/L (ref 15–37)
BACTERIA URNS QL MICRO: NORMAL /HPF
BASOPHILS # BLD: 0 K/UL (ref 0–0.2)
BASOPHILS NFR BLD: 1 % (ref 0–2)
BILIRUB SERPL-MCNC: 0.4 MG/DL (ref 0.2–1.1)
BILIRUB UR QL: NEGATIVE
BUN SERPL-MCNC: 11 MG/DL (ref 6–23)
CALCIUM SERPL-MCNC: 9 MG/DL (ref 8.3–10.4)
CASTS URNS QL MICRO: 0 /LPF
CEA SERPL-MCNC: 1.6 NG/ML (ref 0–3)
CHLORIDE SERPL-SCNC: 110 MMOL/L (ref 98–107)
CO2 SERPL-SCNC: 23 MMOL/L (ref 21–32)
COLOR UR: YELLOW
CREAT SERPL-MCNC: 0.66 MG/DL (ref 0.6–1)
CRYSTALS URNS QL MICRO: 0 /LPF
DIFFERENTIAL METHOD BLD: ABNORMAL
EOSINOPHIL # BLD: 0.1 K/UL (ref 0–0.8)
EOSINOPHIL NFR BLD: 2 % (ref 0.5–7.8)
EPI CELLS #/AREA URNS HPF: NORMAL /HPF
ERYTHROCYTE [DISTWIDTH] IN BLOOD BY AUTOMATED COUNT: 17.5 % (ref 11.9–14.6)
ERYTHROCYTE [SEDIMENTATION RATE] IN BLOOD: 64 MM/HR (ref 0–20)
FERRITIN SERPL-MCNC: 4 NG/ML (ref 8–388)
FOLATE SERPL-MCNC: 2.8 NG/ML (ref 3.1–17.5)
GLOBULIN SER CALC-MCNC: 4.2 G/DL (ref 2.3–3.5)
GLUCOSE SERPL-MCNC: 79 MG/DL (ref 65–100)
GLUCOSE UR STRIP.AUTO-MCNC: NEGATIVE MG/DL
HCT VFR BLD AUTO: 29.7 % (ref 35.8–46.3)
HGB BLD-MCNC: 9.1 G/DL (ref 11.7–15.4)
HGB RETIC QN AUTO: 24 PG (ref 29–35)
HGB UR QL STRIP: NEGATIVE
IMM GRANULOCYTES # BLD: 0 K/UL (ref 0–0.5)
IMM GRANULOCYTES NFR BLD AUTO: 0 % (ref 0–5)
IMM RETICS NFR: 26.4 % (ref 3–15.9)
IRON SATN MFR SERPL: 5 %
IRON SERPL-MCNC: 23 UG/DL (ref 35–150)
KAPPA LC FREE SER-MCNC: 17.07 MG/L (ref 3.3–19.4)
KAPPA LC FREE/LAMBDA FREE SER: 1.09 {RATIO} (ref 0.26–1.65)
KETONES UR QL STRIP.AUTO: NEGATIVE MG/DL
LAMBDA LC FREE SERPL-MCNC: 15.69 MG/L (ref 5.71–26.3)
LDH SERPL L TO P-CCNC: 128 U/L (ref 100–190)
LEUKOCYTE ESTERASE UR QL STRIP.AUTO: ABNORMAL
LYMPHOCYTES # BLD: 1.5 K/UL (ref 0.5–4.6)
LYMPHOCYTES NFR BLD: 24 % (ref 13–44)
MCH RBC QN AUTO: 22.1 PG (ref 26.1–32.9)
MCHC RBC AUTO-ENTMCNC: 30.6 G/DL (ref 31.4–35)
MCV RBC AUTO: 72.1 FL (ref 79.6–97.8)
MONOCYTES # BLD: 0.5 K/UL (ref 0.1–1.3)
MONOCYTES NFR BLD: 7 % (ref 4–12)
MUCOUS THREADS URNS QL MICRO: NORMAL /LPF
NEUTS SEG # BLD: 4.3 K/UL (ref 1.7–8.2)
NEUTS SEG NFR BLD: 67 % (ref 43–78)
NITRITE UR QL STRIP.AUTO: NEGATIVE
NRBC # BLD: 0 K/UL (ref 0–0.2)
PERIPHERAL SMEAR,PSM: NORMAL
PH UR STRIP: 7.5 [PH] (ref 5–9)
PLATELET # BLD AUTO: 265 K/UL (ref 150–450)
PMV BLD AUTO: 10.9 FL (ref 9.4–12.3)
POTASSIUM SERPL-SCNC: 4 MMOL/L (ref 3.5–5.1)
PROT SERPL-MCNC: 7.6 G/DL (ref 6.3–8.2)
PROT UR STRIP-MCNC: NEGATIVE MG/DL
RBC # BLD AUTO: 4.12 M/UL (ref 4.05–5.25)
RBC #/AREA URNS HPF: NORMAL /HPF
RETICS # AUTO: 0.07 M/UL (ref 0.03–0.1)
RETICS/RBC NFR AUTO: 1.8 % (ref 0.3–2)
SODIUM SERPL-SCNC: 138 MMOL/L (ref 136–145)
SP GR UR REFRACTOMETRY: 1.02 (ref 1–1.02)
T4 FREE SERPL-MCNC: 1.1 NG/DL (ref 0.78–1.46)
TIBC SERPL-MCNC: 420 UG/DL (ref 250–450)
TSH SERPL DL<=0.005 MIU/L-ACNC: 0.72 UIU/ML (ref 0.36–3.74)
URATE SERPL-MCNC: 3 MG/DL (ref 2.6–6)
UROBILINOGEN UR QL STRIP.AUTO: 0.2 EU/DL (ref 0.2–1)
VIT B12 SERPL-MCNC: 248 PG/ML (ref 193–986)
WBC # BLD AUTO: 6.5 K/UL (ref 4.3–11.1)
WBC URNS QL MICRO: NORMAL /HPF

## 2018-12-06 PROCEDURE — 84439 ASSAY OF FREE THYROXINE: CPT

## 2018-12-06 PROCEDURE — 82785 ASSAY OF IGE: CPT

## 2018-12-06 PROCEDURE — 86038 ANTINUCLEAR ANTIBODIES: CPT

## 2018-12-06 PROCEDURE — 84238 ASSAY NONENDOCRINE RECEPTOR: CPT

## 2018-12-06 PROCEDURE — 85652 RBC SED RATE AUTOMATED: CPT

## 2018-12-06 PROCEDURE — 82378 CARCINOEMBRYONIC ANTIGEN: CPT

## 2018-12-06 PROCEDURE — 82746 ASSAY OF FOLIC ACID SERUM: CPT

## 2018-12-06 PROCEDURE — 85046 RETICYTE/HGB CONCENTRATE: CPT

## 2018-12-06 PROCEDURE — 83540 ASSAY OF IRON: CPT

## 2018-12-06 PROCEDURE — 81003 URINALYSIS AUTO W/O SCOPE: CPT

## 2018-12-06 PROCEDURE — 81015 MICROSCOPIC EXAM OF URINE: CPT

## 2018-12-06 PROCEDURE — 84550 ASSAY OF BLOOD/URIC ACID: CPT

## 2018-12-06 PROCEDURE — 86334 IMMUNOFIX E-PHORESIS SERUM: CPT

## 2018-12-06 PROCEDURE — 85025 COMPLETE CBC W/AUTO DIFF WBC: CPT

## 2018-12-06 PROCEDURE — 83615 LACTATE (LD) (LDH) ENZYME: CPT

## 2018-12-06 PROCEDURE — 83021 HEMOGLOBIN CHROMOTOGRAPHY: CPT

## 2018-12-06 PROCEDURE — 36415 COLL VENOUS BLD VENIPUNCTURE: CPT

## 2018-12-06 PROCEDURE — 82747 ASSAY OF FOLIC ACID RBC: CPT

## 2018-12-06 PROCEDURE — 82607 VITAMIN B-12: CPT

## 2018-12-06 PROCEDURE — 82784 ASSAY IGA/IGD/IGG/IGM EACH: CPT

## 2018-12-06 PROCEDURE — 84443 ASSAY THYROID STIM HORMONE: CPT

## 2018-12-06 PROCEDURE — 82728 ASSAY OF FERRITIN: CPT

## 2018-12-06 PROCEDURE — 80053 COMPREHEN METABOLIC PANEL: CPT

## 2018-12-06 PROCEDURE — 83883 ASSAY NEPHELOMETRY NOT SPEC: CPT

## 2018-12-07 LAB
ALBUMIN SERPL ELPH-MCNC: 3.61 G/DL (ref 3.2–5.6)
ALBUMIN/GLOB SERPL: 1 {RATIO}
ALPHA1 GLOB SERPL ELPH-MCNC: 0.21 G/DL (ref 0.1–0.4)
ALPHA2 GLOB SERPL ELPH-MCNC: 0.88 G/DL (ref 0.4–1.2)
ANA SER QL: NEGATIVE
B-GLOBULIN SERPL QL ELPH: 1.23 G/DL (ref 0.6–1.3)
DEPRECATED HGB OTHER BLD-IMP: 0 %
FOLATE BLD-MCNC: 229.6 NG/ML
FOLATE RBC-MCNC: 773 NG/ML
GAMMA GLOB MFR SERPL ELPH: 1.18 G/DL (ref 0.5–1.6)
HCT VFR BLD AUTO: 29.7 % (ref 34–46.6)
HGB A MFR BLD: 98.3 % (ref 96.4–98.8)
HGB A2 MFR BLD COLUMN CHROM: 1.7 % (ref 1.8–3.2)
HGB C MFR BLD: 0 %
HGB F MFR BLD: 0 % (ref 0–2)
HGB FRACT BLD-IMP: ABNORMAL
HGB S BLD QL SOLY: NEGATIVE
HGB S MFR BLD: 0 %
IGA SERPL-MCNC: 245 MG/DL (ref 85–499)
IGG SERPL-MCNC: 1028 MG/DL (ref 610–1616)
IGM SERPL-MCNC: 177 MG/DL (ref 35–242)
M PROTEIN SERPL ELPH-MCNC: NORMAL G/DL
PROT PATTERN SERPL ELPH-IMP: NORMAL
PROT PATTERN SPEC IFE-IMP: NORMAL
PROT SERPL-MCNC: 7.1 G/DL (ref 6.3–8.2)
TRANSFERRIN SERPL-SCNC: 52.1 NMOL/L (ref 12.2–27.3)

## 2018-12-08 LAB — IGE SERPL-ACNC: 308 IU/ML (ref 0–100)

## 2018-12-11 ENCOUNTER — HOSPITAL ENCOUNTER (OUTPATIENT)
Dept: INFUSION THERAPY | Age: 38
End: 2018-12-11

## 2018-12-18 ENCOUNTER — APPOINTMENT (OUTPATIENT)
Dept: INFUSION THERAPY | Age: 38
End: 2018-12-18

## 2018-12-19 ENCOUNTER — HOSPITAL ENCOUNTER (OUTPATIENT)
Dept: INFUSION THERAPY | Age: 38
Discharge: HOME | End: 2018-12-19
Payer: COMMERCIAL

## 2018-12-19 VITALS
DIASTOLIC BLOOD PRESSURE: 82 MMHG | WEIGHT: 281 LBS | BODY MASS INDEX: 47.49 KG/M2 | HEART RATE: 93 BPM | OXYGEN SATURATION: 95 % | TEMPERATURE: 98.4 F | RESPIRATION RATE: 16 BRPM | SYSTOLIC BLOOD PRESSURE: 144 MMHG

## 2018-12-19 DIAGNOSIS — D50.9 IRON DEFICIENCY ANEMIA, UNSPECIFIED IRON DEFICIENCY ANEMIA TYPE: ICD-10-CM

## 2018-12-19 DIAGNOSIS — Z98.84 H/O GASTRIC BYPASS: Primary | ICD-10-CM

## 2018-12-19 PROCEDURE — 96365 THER/PROPH/DIAG IV INF INIT: CPT

## 2018-12-19 PROCEDURE — 96361 HYDRATE IV INFUSION ADD-ON: CPT

## 2018-12-19 PROCEDURE — 74011250636 HC RX REV CODE- 250/636: Performed by: PEDIATRICS

## 2018-12-19 RX ORDER — SODIUM CHLORIDE 0.9 % (FLUSH) 0.9 %
10 SYRINGE (ML) INJECTION AS NEEDED
Status: ACTIVE | OUTPATIENT
Start: 2018-12-19 | End: 2018-12-20

## 2018-12-19 RX ADMIN — FERRIC CARBOXYMALTOSE INJECTION 750 MG: 50 INJECTION, SOLUTION INTRAVENOUS at 17:20

## 2018-12-19 RX ADMIN — Medication 10 ML: at 17:15

## 2018-12-19 RX ADMIN — SODIUM CHLORIDE 500 ML: 900 INJECTION, SOLUTION INTRAVENOUS at 17:15

## 2018-12-19 NOTE — PROGRESS NOTES
Pt arrived ambulatory with cane to [facility]. IV established with good blood return. NS infusing. Injectafer infused. Pt monitored. Pt tolerated well. Pt  Aware of next appt on 12/26/18 at 1700. Pt discharged ambulatory.

## 2018-12-20 ENCOUNTER — APPOINTMENT (OUTPATIENT)
Dept: INFUSION THERAPY | Age: 38
End: 2018-12-20
Payer: COMMERCIAL

## 2018-12-24 ENCOUNTER — APPOINTMENT (OUTPATIENT)
Dept: INFUSION THERAPY | Age: 38
End: 2018-12-24
Payer: COMMERCIAL

## 2018-12-26 ENCOUNTER — HOSPITAL ENCOUNTER (OUTPATIENT)
Dept: INFUSION THERAPY | Age: 38
Discharge: HOME | End: 2018-12-26
Payer: COMMERCIAL

## 2018-12-26 VITALS
SYSTOLIC BLOOD PRESSURE: 141 MMHG | HEART RATE: 83 BPM | DIASTOLIC BLOOD PRESSURE: 86 MMHG | RESPIRATION RATE: 18 BRPM | TEMPERATURE: 98.2 F | OXYGEN SATURATION: 99 %

## 2018-12-26 DIAGNOSIS — D50.9 IRON DEFICIENCY ANEMIA, UNSPECIFIED IRON DEFICIENCY ANEMIA TYPE: ICD-10-CM

## 2018-12-26 DIAGNOSIS — Z98.84 H/O GASTRIC BYPASS: Primary | ICD-10-CM

## 2018-12-26 DIAGNOSIS — R53.83 FATIGUE, UNSPECIFIED TYPE: ICD-10-CM

## 2018-12-26 PROCEDURE — 74011250636 HC RX REV CODE- 250/636: Performed by: PEDIATRICS

## 2018-12-26 PROCEDURE — 96365 THER/PROPH/DIAG IV INF INIT: CPT

## 2018-12-26 RX ORDER — SODIUM CHLORIDE 0.9 % (FLUSH) 0.9 %
10 SYRINGE (ML) INJECTION AS NEEDED
Status: ACTIVE | OUTPATIENT
Start: 2018-12-26 | End: 2018-12-27

## 2018-12-26 RX ADMIN — FERRIC CARBOXYMALTOSE INJECTION 750 MG: 50 INJECTION, SOLUTION INTRAVENOUS at 17:20

## 2018-12-26 RX ADMIN — SODIUM CHLORIDE 500 ML: 900 INJECTION, SOLUTION INTRAVENOUS at 17:18

## 2018-12-26 RX ADMIN — Medication 10 ML: at 17:15

## 2018-12-26 NOTE — PROGRESS NOTES
Pt arrived ambulatory with cane to [facility]. IV established with good blood return. NS infusing. Injectafer infused. Pt monitored. Pt tolerated well. IV discontinued with tip intact. Pt has no more appts with [facility] at this time. Pt discharged ambulatory.

## 2019-03-14 ENCOUNTER — HOSPITAL ENCOUNTER (OUTPATIENT)
Dept: LAB | Age: 39
Discharge: HOME | End: 2019-03-14
Payer: COMMERCIAL

## 2019-03-14 DIAGNOSIS — D50.9 IRON DEFICIENCY ANEMIA, UNSPECIFIED IRON DEFICIENCY ANEMIA TYPE: ICD-10-CM

## 2019-03-14 LAB
ALBUMIN SERPL-MCNC: 3.6 G/DL (ref 3.5–5)
ALBUMIN/GLOB SERPL: 0.9 {RATIO} (ref 1.2–3.5)
ALP SERPL-CCNC: 81 U/L (ref 50–136)
ALT SERPL-CCNC: 17 U/L (ref 12–65)
ANION GAP SERPL CALC-SCNC: 2 MMOL/L (ref 7–16)
AST SERPL-CCNC: 9 U/L (ref 15–37)
BASOPHILS # BLD: 0 K/UL (ref 0–0.2)
BASOPHILS NFR BLD: 0 % (ref 0–2)
BILIRUB SERPL-MCNC: 0.4 MG/DL (ref 0.2–1.1)
BUN SERPL-MCNC: 11 MG/DL (ref 6–23)
CALCIUM SERPL-MCNC: 8.7 MG/DL (ref 8.3–10.4)
CHLORIDE SERPL-SCNC: 108 MMOL/L (ref 98–107)
CO2 SERPL-SCNC: 28 MMOL/L (ref 21–32)
CREAT SERPL-MCNC: 0.85 MG/DL (ref 0.6–1)
DIFFERENTIAL METHOD BLD: ABNORMAL
EOSINOPHIL # BLD: 0.1 K/UL (ref 0–0.8)
EOSINOPHIL NFR BLD: 2 % (ref 0.5–7.8)
ERYTHROCYTE [DISTWIDTH] IN BLOOD BY AUTOMATED COUNT: 19.8 % (ref 11.9–14.6)
FERRITIN SERPL-MCNC: 107 NG/ML (ref 8–388)
FOLATE SERPL-MCNC: 2.1 NG/ML (ref 3.1–17.5)
GLOBULIN SER CALC-MCNC: 4.2 G/DL (ref 2.3–3.5)
GLUCOSE SERPL-MCNC: 77 MG/DL (ref 65–100)
HCT VFR BLD AUTO: 42.6 % (ref 35.8–46.3)
HGB BLD-MCNC: 13.8 G/DL (ref 11.7–15.4)
HGB RETIC QN AUTO: 34 PG (ref 29–35)
IMM GRANULOCYTES # BLD AUTO: 0 K/UL (ref 0–0.5)
IMM GRANULOCYTES NFR BLD AUTO: 0 % (ref 0–5)
IMM RETICS NFR: 8.2 % (ref 3–15.9)
IRON SATN MFR SERPL: 16 %
IRON SERPL-MCNC: 47 UG/DL (ref 35–150)
LYMPHOCYTES # BLD: 1.6 K/UL (ref 0.5–4.6)
LYMPHOCYTES NFR BLD: 22 % (ref 13–44)
MCH RBC QN AUTO: 28.6 PG (ref 26.1–32.9)
MCHC RBC AUTO-ENTMCNC: 32.4 G/DL (ref 31.4–35)
MCV RBC AUTO: 88.4 FL (ref 79.6–97.8)
MONOCYTES # BLD: 0.4 K/UL (ref 0.1–1.3)
MONOCYTES NFR BLD: 6 % (ref 4–12)
NEUTS SEG # BLD: 5.1 K/UL (ref 1.7–8.2)
NEUTS SEG NFR BLD: 70 % (ref 43–78)
NRBC # BLD: 0 K/UL (ref 0–0.2)
PLATELET # BLD AUTO: 254 K/UL (ref 150–450)
PMV BLD AUTO: 10.3 FL (ref 9.4–12.3)
POTASSIUM SERPL-SCNC: 4.1 MMOL/L (ref 3.5–5.1)
PROT SERPL-MCNC: 7.8 G/DL (ref 6.3–8.2)
RBC # BLD AUTO: 4.82 M/UL (ref 4.05–5.25)
RETICS # AUTO: 0.05 M/UL (ref 0.03–0.1)
RETICS/RBC NFR AUTO: 1.1 % (ref 0.3–2)
SODIUM SERPL-SCNC: 138 MMOL/L (ref 136–145)
TIBC SERPL-MCNC: 293 UG/DL (ref 250–450)
VIT B12 SERPL-MCNC: 272 PG/ML (ref 193–986)
WBC # BLD AUTO: 7.3 K/UL (ref 4.3–11.1)

## 2019-03-14 PROCEDURE — 36415 COLL VENOUS BLD VENIPUNCTURE: CPT

## 2019-03-14 PROCEDURE — 84238 ASSAY NONENDOCRINE RECEPTOR: CPT

## 2019-03-14 PROCEDURE — 85046 RETICYTE/HGB CONCENTRATE: CPT

## 2019-03-14 PROCEDURE — 82746 ASSAY OF FOLIC ACID SERUM: CPT

## 2019-03-14 PROCEDURE — 83540 ASSAY OF IRON: CPT

## 2019-03-14 PROCEDURE — 85025 COMPLETE CBC W/AUTO DIFF WBC: CPT

## 2019-03-14 PROCEDURE — 82607 VITAMIN B-12: CPT

## 2019-03-14 PROCEDURE — 82728 ASSAY OF FERRITIN: CPT

## 2019-03-14 PROCEDURE — 80053 COMPREHEN METABOLIC PANEL: CPT

## 2019-03-15 LAB — TRANSFERRIN SERPL-SCNC: 18.9 NMOL/L (ref 12.2–27.3)

## 2019-06-13 ENCOUNTER — HOSPITAL ENCOUNTER (OUTPATIENT)
Dept: INFUSION THERAPY | Age: 39
Discharge: HOME | End: 2019-06-13

## 2019-06-13 ENCOUNTER — HOSPITAL ENCOUNTER (OUTPATIENT)
Dept: LAB | Age: 39
Discharge: HOME | End: 2019-06-13
Payer: COMMERCIAL

## 2019-06-13 DIAGNOSIS — E53.8 VITAMIN B12 DEFICIENCY: ICD-10-CM

## 2019-06-13 DIAGNOSIS — Z98.84 H/O GASTRIC BYPASS: ICD-10-CM

## 2019-06-13 DIAGNOSIS — D50.9 IRON DEFICIENCY ANEMIA, UNSPECIFIED IRON DEFICIENCY ANEMIA TYPE: ICD-10-CM

## 2019-06-13 LAB
ALBUMIN SERPL-MCNC: 3.4 G/DL (ref 3.5–5)
ALBUMIN/GLOB SERPL: 0.9 {RATIO} (ref 1.2–3.5)
ALP SERPL-CCNC: 71 U/L (ref 50–136)
ALT SERPL-CCNC: 17 U/L (ref 12–65)
ANION GAP SERPL CALC-SCNC: 9 MMOL/L (ref 7–16)
AST SERPL-CCNC: 11 U/L (ref 15–37)
BASOPHILS # BLD: 0 K/UL (ref 0–0.2)
BASOPHILS NFR BLD: 1 % (ref 0–2)
BILIRUB SERPL-MCNC: 0.3 MG/DL (ref 0.2–1.1)
BUN SERPL-MCNC: 10 MG/DL (ref 6–23)
CALCIUM SERPL-MCNC: 8.7 MG/DL (ref 8.3–10.4)
CHLORIDE SERPL-SCNC: 109 MMOL/L (ref 98–107)
CO2 SERPL-SCNC: 21 MMOL/L (ref 21–32)
CREAT SERPL-MCNC: 0.79 MG/DL (ref 0.6–1)
DIFFERENTIAL METHOD BLD: NORMAL
EOSINOPHIL # BLD: 0.2 K/UL (ref 0–0.8)
EOSINOPHIL NFR BLD: 2 % (ref 0.5–7.8)
ERYTHROCYTE [DISTWIDTH] IN BLOOD BY AUTOMATED COUNT: 14.6 % (ref 11.9–14.6)
FERRITIN SERPL-MCNC: 54 NG/ML (ref 8–388)
GLOBULIN SER CALC-MCNC: 3.7 G/DL (ref 2.3–3.5)
GLUCOSE SERPL-MCNC: 90 MG/DL (ref 65–100)
HCT VFR BLD AUTO: 38.3 % (ref 35.8–46.3)
HGB BLD-MCNC: 12.7 G/DL (ref 11.7–15.4)
HGB RETIC QN AUTO: 33 PG (ref 29–35)
IMM GRANULOCYTES # BLD AUTO: 0 K/UL (ref 0–0.5)
IMM GRANULOCYTES NFR BLD AUTO: 0 % (ref 0–5)
IMM RETICS NFR: 10.4 % (ref 3–15.9)
IRON SATN MFR SERPL: 24 %
IRON SERPL-MCNC: 69 UG/DL (ref 35–150)
LYMPHOCYTES # BLD: 1.8 K/UL (ref 0.5–4.6)
LYMPHOCYTES NFR BLD: 23 % (ref 13–44)
MCH RBC QN AUTO: 29.7 PG (ref 26.1–32.9)
MCHC RBC AUTO-ENTMCNC: 33.2 G/DL (ref 31.4–35)
MCV RBC AUTO: 89.7 FL (ref 79.6–97.8)
MONOCYTES # BLD: 0.6 K/UL (ref 0.1–1.3)
MONOCYTES NFR BLD: 8 % (ref 4–12)
NEUTS SEG # BLD: 5.2 K/UL (ref 1.7–8.2)
NEUTS SEG NFR BLD: 66 % (ref 43–78)
NRBC # BLD: 0.01 K/UL (ref 0–0.2)
PLATELET # BLD AUTO: 217 K/UL (ref 150–450)
PMV BLD AUTO: 11.3 FL (ref 9.4–12.3)
POTASSIUM SERPL-SCNC: 4.1 MMOL/L (ref 3.5–5.1)
PROT SERPL-MCNC: 7.1 G/DL (ref 6.3–8.2)
RBC # BLD AUTO: 4.27 M/UL (ref 4.05–5.25)
RETICS # AUTO: 0.07 M/UL (ref 0.03–0.1)
RETICS/RBC NFR AUTO: 1.6 % (ref 0.3–2)
SODIUM SERPL-SCNC: 139 MMOL/L (ref 136–145)
TIBC SERPL-MCNC: 284 UG/DL (ref 250–450)
VIT B12 SERPL-MCNC: 207 PG/ML (ref 193–986)
WBC # BLD AUTO: 7.8 K/UL (ref 4.3–11.1)

## 2019-06-13 PROCEDURE — 82607 VITAMIN B-12: CPT

## 2019-06-13 PROCEDURE — 84238 ASSAY NONENDOCRINE RECEPTOR: CPT

## 2019-06-13 PROCEDURE — 83540 ASSAY OF IRON: CPT

## 2019-06-13 PROCEDURE — 85046 RETICYTE/HGB CONCENTRATE: CPT

## 2019-06-13 PROCEDURE — 36415 COLL VENOUS BLD VENIPUNCTURE: CPT

## 2019-06-13 PROCEDURE — 85025 COMPLETE CBC W/AUTO DIFF WBC: CPT

## 2019-06-13 PROCEDURE — 80053 COMPREHEN METABOLIC PANEL: CPT

## 2019-06-13 PROCEDURE — 82728 ASSAY OF FERRITIN: CPT

## 2019-06-14 LAB — TRANSFERRIN SERPL-SCNC: 18.2 NMOL/L (ref 12.2–27.3)

## 2019-06-20 ENCOUNTER — APPOINTMENT (OUTPATIENT)
Dept: INFUSION THERAPY | Age: 39
End: 2019-06-20

## 2019-07-22 ENCOUNTER — HOSPITAL ENCOUNTER (OUTPATIENT)
Dept: MRI IMAGING | Age: 39
Discharge: HOME | End: 2019-07-22
Attending: FAMILY MEDICINE
Payer: COMMERCIAL

## 2019-07-22 DIAGNOSIS — Z87.898 HISTORY OF SYNCOPE: ICD-10-CM

## 2019-07-22 DIAGNOSIS — R42 DIZZINESS: ICD-10-CM

## 2019-07-22 DIAGNOSIS — G43.909 MIGRAINE WITHOUT STATUS MIGRAINOSUS, NOT INTRACTABLE, UNSPECIFIED MIGRAINE TYPE: ICD-10-CM

## 2019-07-22 PROCEDURE — 70551 MRI BRAIN STEM W/O DYE: CPT

## 2019-12-17 ENCOUNTER — HOSPITAL ENCOUNTER (OUTPATIENT)
Dept: LAB | Age: 39
Discharge: HOME | End: 2019-12-17
Payer: COMMERCIAL

## 2019-12-17 ENCOUNTER — DOCUMENTATION ONLY (OUTPATIENT)
Dept: HEMATOLOGY | Age: 39
End: 2019-12-17

## 2019-12-17 DIAGNOSIS — E53.8 VITAMIN B12 DEFICIENCY: ICD-10-CM

## 2019-12-17 DIAGNOSIS — D64.9 ANEMIA, UNSPECIFIED TYPE: ICD-10-CM

## 2019-12-17 DIAGNOSIS — E53.8 FOLIC ACID DEFICIENCY: ICD-10-CM

## 2019-12-17 DIAGNOSIS — R53.83 FATIGUE, UNSPECIFIED TYPE: ICD-10-CM

## 2019-12-17 DIAGNOSIS — D50.9 IRON DEFICIENCY ANEMIA, UNSPECIFIED IRON DEFICIENCY ANEMIA TYPE: ICD-10-CM

## 2019-12-17 DIAGNOSIS — Z98.84 H/O GASTRIC BYPASS: ICD-10-CM

## 2019-12-17 LAB
ALBUMIN SERPL-MCNC: 3.4 G/DL (ref 3.5–5)
ALBUMIN/GLOB SERPL: 0.8 {RATIO} (ref 1.2–3.5)
ALP SERPL-CCNC: 76 U/L (ref 50–136)
ALT SERPL-CCNC: 16 U/L (ref 12–65)
ANION GAP SERPL CALC-SCNC: 4 MMOL/L (ref 7–16)
AST SERPL-CCNC: 16 U/L (ref 15–37)
BASOPHILS # BLD: 0 K/UL (ref 0–0.2)
BASOPHILS NFR BLD: 1 % (ref 0–2)
BILIRUB SERPL-MCNC: 0.4 MG/DL (ref 0.2–1.1)
BUN SERPL-MCNC: 13 MG/DL (ref 6–23)
CALCIUM SERPL-MCNC: 9 MG/DL (ref 8.3–10.4)
CHLORIDE SERPL-SCNC: 107 MMOL/L (ref 98–107)
CO2 SERPL-SCNC: 27 MMOL/L (ref 21–32)
CREAT SERPL-MCNC: 0.82 MG/DL (ref 0.6–1)
DIFFERENTIAL METHOD BLD: NORMAL
EOSINOPHIL # BLD: 0.1 K/UL (ref 0–0.8)
EOSINOPHIL NFR BLD: 1 % (ref 0.5–7.8)
ERYTHROCYTE [DISTWIDTH] IN BLOOD BY AUTOMATED COUNT: 14.4 % (ref 11.9–14.6)
FERRITIN SERPL-MCNC: 29 NG/ML (ref 8–388)
FOLATE SERPL-MCNC: 9.8 NG/ML (ref 3.1–17.5)
GLOBULIN SER CALC-MCNC: 4.2 G/DL (ref 2.3–3.5)
GLUCOSE SERPL-MCNC: 90 MG/DL (ref 65–100)
HCT VFR BLD AUTO: 37 % (ref 35.8–46.3)
HGB BLD-MCNC: 12 G/DL (ref 11.7–15.4)
HGB RETIC QN AUTO: 32 PG (ref 29–35)
IMM GRANULOCYTES # BLD AUTO: 0 K/UL (ref 0–0.5)
IMM GRANULOCYTES NFR BLD AUTO: 0 % (ref 0–5)
IMM RETICS NFR: 16.6 % (ref 3–15.9)
IRON SATN MFR SERPL: 15 %
IRON SERPL-MCNC: 46 UG/DL (ref 35–150)
LYMPHOCYTES # BLD: 1.5 K/UL (ref 0.5–4.6)
LYMPHOCYTES NFR BLD: 20 % (ref 13–44)
MCH RBC QN AUTO: 28.5 PG (ref 26.1–32.9)
MCHC RBC AUTO-ENTMCNC: 32.4 G/DL (ref 31.4–35)
MCV RBC AUTO: 87.9 FL (ref 79.6–97.8)
MONOCYTES # BLD: 0.5 K/UL (ref 0.1–1.3)
MONOCYTES NFR BLD: 6 % (ref 4–12)
NEUTS SEG # BLD: 5.7 K/UL (ref 1.7–8.2)
NEUTS SEG NFR BLD: 72 % (ref 43–78)
NRBC # BLD: 0 K/UL (ref 0–0.2)
PLATELET # BLD AUTO: 231 K/UL (ref 150–450)
PMV BLD AUTO: 11.4 FL (ref 9.4–12.3)
POTASSIUM SERPL-SCNC: 3.9 MMOL/L (ref 3.5–5.1)
PROT SERPL-MCNC: 7.6 G/DL (ref 6.3–8.2)
RBC # BLD AUTO: 4.21 M/UL (ref 4.05–5.25)
RETICS # AUTO: 0.08 M/UL (ref 0.03–0.1)
RETICS/RBC NFR AUTO: 1.8 % (ref 0.3–2)
SODIUM SERPL-SCNC: 138 MMOL/L (ref 136–145)
TIBC SERPL-MCNC: 309 UG/DL (ref 250–450)
VIT B12 SERPL-MCNC: 258 PG/ML (ref 193–986)
WBC # BLD AUTO: 7.8 K/UL (ref 4.3–11.1)

## 2019-12-17 PROCEDURE — 83516 IMMUNOASSAY NONANTIBODY: CPT

## 2019-12-17 PROCEDURE — 85025 COMPLETE CBC W/AUTO DIFF WBC: CPT

## 2019-12-17 PROCEDURE — 85046 RETICYTE/HGB CONCENTRATE: CPT

## 2019-12-17 PROCEDURE — 82746 ASSAY OF FOLIC ACID SERUM: CPT

## 2019-12-17 PROCEDURE — 82607 VITAMIN B-12: CPT

## 2019-12-17 PROCEDURE — 83540 ASSAY OF IRON: CPT

## 2019-12-17 PROCEDURE — 82728 ASSAY OF FERRITIN: CPT

## 2019-12-17 PROCEDURE — 36415 COLL VENOUS BLD VENIPUNCTURE: CPT

## 2019-12-17 PROCEDURE — 80053 COMPREHEN METABOLIC PANEL: CPT

## 2019-12-17 PROCEDURE — 86340 INTRINSIC FACTOR ANTIBODY: CPT

## 2019-12-17 NOTE — PROGRESS NOTES
Patient came in for labs today. I was approached by registration/patient access in the hallway; the patient had expressed concern about her co-pay assistance for Injectafer. She stated that she knew it was expiring this month. I had Ms. Reaganilia María Elena sign an LPOA and gave her a blayne application for financial assistance. Lorena Queen is Ms. Elena Morejon financial navigator and will renew her copay assistance in her chart as well as will follow-up with her in the future.

## 2019-12-18 LAB — IF BLOCK AB SER-ACNC: 1 AU/ML (ref 0–1.1)

## 2019-12-19 LAB — PCA AB SER-ACNC: 2 UNITS (ref 0–20)

## 2019-12-23 ENCOUNTER — HOSPITAL ENCOUNTER (OUTPATIENT)
Dept: INFUSION THERAPY | Age: 39
Discharge: HOME | End: 2019-12-23
Payer: COMMERCIAL

## 2019-12-23 VITALS
BODY MASS INDEX: 50.71 KG/M2 | RESPIRATION RATE: 18 BRPM | OXYGEN SATURATION: 98 % | WEIGHT: 293 LBS | TEMPERATURE: 97.9 F | HEART RATE: 82 BPM | SYSTOLIC BLOOD PRESSURE: 117 MMHG | DIASTOLIC BLOOD PRESSURE: 72 MMHG

## 2019-12-23 DIAGNOSIS — R53.83 FATIGUE, UNSPECIFIED TYPE: ICD-10-CM

## 2019-12-23 DIAGNOSIS — D50.9 IRON DEFICIENCY ANEMIA, UNSPECIFIED IRON DEFICIENCY ANEMIA TYPE: ICD-10-CM

## 2019-12-23 DIAGNOSIS — Z98.84 H/O GASTRIC BYPASS: Primary | ICD-10-CM

## 2019-12-23 PROCEDURE — 74011250636 HC RX REV CODE- 250/636: Performed by: PEDIATRICS

## 2019-12-23 PROCEDURE — 96365 THER/PROPH/DIAG IV INF INIT: CPT

## 2019-12-23 RX ORDER — SODIUM CHLORIDE 9 MG/ML
10 INJECTION INTRAMUSCULAR; INTRAVENOUS; SUBCUTANEOUS AS NEEDED
Status: DISCONTINUED | OUTPATIENT
Start: 2019-12-23 | End: 2019-12-24 | Stop reason: HOSPADM

## 2019-12-23 RX ADMIN — SODIUM CHLORIDE 500 ML: 900 INJECTION, SOLUTION INTRAVENOUS at 15:50

## 2019-12-23 RX ADMIN — SODIUM CHLORIDE 750 MG: 900 INJECTION, SOLUTION INTRAVENOUS at 15:30

## 2019-12-23 RX ADMIN — SODIUM CHLORIDE 10 ML: 9 INJECTION INTRAMUSCULAR; INTRAVENOUS; SUBCUTANEOUS at 15:25

## 2019-12-23 NOTE — PROGRESS NOTES
Arrived to the [facility]. Assessment and Injectafer infusion completed. Patient tolerated well. Any issues or concerns during appointment: none. Patient aware of next infusion appointment on 12/30/2019 at 1730. Discharged ambulatory with cane.

## 2019-12-30 ENCOUNTER — HOSPITAL ENCOUNTER (OUTPATIENT)
Dept: INFUSION THERAPY | Age: 39
Discharge: HOME | End: 2019-12-30
Payer: COMMERCIAL

## 2019-12-30 VITALS
OXYGEN SATURATION: 100 % | HEART RATE: 80 BPM | TEMPERATURE: 98 F | RESPIRATION RATE: 20 BRPM | SYSTOLIC BLOOD PRESSURE: 124 MMHG | DIASTOLIC BLOOD PRESSURE: 82 MMHG

## 2019-12-30 DIAGNOSIS — Z98.84 H/O GASTRIC BYPASS: Primary | ICD-10-CM

## 2019-12-30 DIAGNOSIS — R53.83 FATIGUE, UNSPECIFIED TYPE: ICD-10-CM

## 2019-12-30 DIAGNOSIS — D50.9 IRON DEFICIENCY ANEMIA, UNSPECIFIED IRON DEFICIENCY ANEMIA TYPE: ICD-10-CM

## 2019-12-30 PROCEDURE — 96365 THER/PROPH/DIAG IV INF INIT: CPT

## 2019-12-30 PROCEDURE — 74011250636 HC RX REV CODE- 250/636: Performed by: PEDIATRICS

## 2019-12-30 RX ORDER — SODIUM CHLORIDE 0.9 % (FLUSH) 0.9 %
10 SYRINGE (ML) INJECTION AS NEEDED
Status: DISCONTINUED | OUTPATIENT
Start: 2019-12-30 | End: 2019-12-31 | Stop reason: HOSPADM

## 2019-12-30 RX ADMIN — SODIUM CHLORIDE 500 ML: 900 INJECTION, SOLUTION INTRAVENOUS at 18:19

## 2019-12-30 RX ADMIN — SODIUM CHLORIDE 750 MG: 900 INJECTION, SOLUTION INTRAVENOUS at 17:50

## 2019-12-30 RX ADMIN — Medication 10 ML: at 18:47

## 2020-02-28 ENCOUNTER — HOSPITAL ENCOUNTER (OUTPATIENT)
Dept: LAB | Age: 40
Discharge: HOME | End: 2020-02-28
Payer: SELF-PAY

## 2020-02-28 LAB
ALBUMIN SERPL-MCNC: 4 G/DL (ref 3.5–5)
ALBUMIN/GLOB SERPL: 1.1 {RATIO} (ref 1.2–3.5)
ALP SERPL-CCNC: 72 U/L (ref 50–136)
ALT SERPL-CCNC: 23 U/L (ref 12–65)
ANION GAP SERPL CALC-SCNC: 4 MMOL/L (ref 7–16)
AST SERPL-CCNC: 12 U/L (ref 15–37)
BILIRUB SERPL-MCNC: 0.4 MG/DL (ref 0.2–1.1)
BUN SERPL-MCNC: 13 MG/DL (ref 6–23)
CALCIUM SERPL-MCNC: 9 MG/DL (ref 8.3–10.4)
CHLORIDE SERPL-SCNC: 109 MMOL/L (ref 98–107)
CO2 SERPL-SCNC: 29 MMOL/L (ref 21–32)
CREAT SERPL-MCNC: 0.81 MG/DL (ref 0.6–1)
ERYTHROCYTE [DISTWIDTH] IN BLOOD BY AUTOMATED COUNT: 14.8 % (ref 11.9–14.6)
GLOBULIN SER CALC-MCNC: 3.6 G/DL (ref 2.3–3.5)
GLUCOSE SERPL-MCNC: 82 MG/DL (ref 65–100)
HCT VFR BLD AUTO: 42.1 % (ref 35.8–46.3)
HGB BLD-MCNC: 13.5 G/DL (ref 11.7–15.4)
MCH RBC QN AUTO: 29.5 PG (ref 26.1–32.9)
MCHC RBC AUTO-ENTMCNC: 32.1 G/DL (ref 31.4–35)
MCV RBC AUTO: 91.9 FL (ref 79.6–97.8)
NRBC # BLD: 0 K/UL (ref 0–0.2)
PLATELET # BLD AUTO: 235 K/UL (ref 150–450)
PMV BLD AUTO: 11 FL (ref 9.4–12.3)
POTASSIUM SERPL-SCNC: 3.9 MMOL/L (ref 3.5–5.1)
PROT SERPL-MCNC: 7.6 G/DL (ref 6.3–8.2)
RBC # BLD AUTO: 4.58 M/UL (ref 4.05–5.2)
SODIUM SERPL-SCNC: 142 MMOL/L (ref 136–145)
T4 FREE SERPL-MCNC: 1 NG/DL (ref 0.78–1.46)
TSH SERPL DL<=0.005 MIU/L-ACNC: 1 UIU/ML (ref 0.36–3.74)
WBC # BLD AUTO: 8.5 K/UL (ref 4.3–11.1)

## 2020-02-28 PROCEDURE — 84439 ASSAY OF FREE THYROXINE: CPT

## 2020-02-28 PROCEDURE — 80053 COMPREHEN METABOLIC PANEL: CPT

## 2020-02-28 PROCEDURE — 36415 COLL VENOUS BLD VENIPUNCTURE: CPT

## 2020-02-28 PROCEDURE — 84443 ASSAY THYROID STIM HORMONE: CPT

## 2020-02-28 PROCEDURE — 85027 COMPLETE CBC AUTOMATED: CPT

## 2020-02-29 NOTE — PROGRESS NOTES
Lab results: Her TSH, liver function, and renal function are essentially within normal limits, per lab; just KALIN for the patient; thank you, JF

## 2020-06-15 ENCOUNTER — HOSPITAL ENCOUNTER (OUTPATIENT)
Dept: LAB | Age: 40
Discharge: HOME | End: 2020-06-15
Payer: SELF-PAY

## 2020-06-15 LAB
ALBUMIN SERPL-MCNC: 3.2 G/DL (ref 3.5–5)
ALBUMIN/GLOB SERPL: 0.7 {RATIO} (ref 1.2–3.5)
ALP SERPL-CCNC: 83 U/L (ref 50–136)
ALT SERPL-CCNC: 23 U/L (ref 12–65)
ANION GAP SERPL CALC-SCNC: 7 MMOL/L (ref 7–16)
AST SERPL-CCNC: 16 U/L (ref 15–37)
BILIRUB SERPL-MCNC: 0.6 MG/DL (ref 0.2–1.1)
BUN SERPL-MCNC: 10 MG/DL (ref 6–23)
CALCIUM SERPL-MCNC: 8.9 MG/DL (ref 8.3–10.4)
CHLORIDE SERPL-SCNC: 106 MMOL/L (ref 98–107)
CHOLEST SERPL-MCNC: 207 MG/DL
CO2 SERPL-SCNC: 25 MMOL/L (ref 21–32)
CREAT SERPL-MCNC: 0.76 MG/DL (ref 0.6–1)
ERYTHROCYTE [DISTWIDTH] IN BLOOD BY AUTOMATED COUNT: 13.6 % (ref 11.9–14.6)
GLOBULIN SER CALC-MCNC: 4.4 G/DL (ref 2.3–3.5)
GLUCOSE SERPL-MCNC: 90 MG/DL (ref 65–100)
HCT VFR BLD AUTO: 41.6 % (ref 35.8–46.3)
HDLC SERPL-MCNC: 52 MG/DL (ref 40–60)
HDLC SERPL: 4 {RATIO}
HGB BLD-MCNC: 13.8 G/DL (ref 11.7–15.4)
LDLC SERPL CALC-MCNC: 124 MG/DL
LIPID PROFILE,FLP: ABNORMAL
MCH RBC QN AUTO: 30.3 PG (ref 26.1–32.9)
MCHC RBC AUTO-ENTMCNC: 33.2 G/DL (ref 31.4–35)
MCV RBC AUTO: 91.2 FL (ref 79.6–97.8)
NRBC # BLD: 0 K/UL (ref 0–0.2)
PLATELET # BLD AUTO: 234 K/UL (ref 150–450)
PMV BLD AUTO: 11.2 FL (ref 9.4–12.3)
POTASSIUM SERPL-SCNC: 4.1 MMOL/L (ref 3.5–5.1)
PROT SERPL-MCNC: 7.6 G/DL (ref 6.3–8.2)
RBC # BLD AUTO: 4.56 M/UL (ref 4.05–5.2)
SODIUM SERPL-SCNC: 138 MMOL/L (ref 136–145)
T4 FREE SERPL-MCNC: 1 NG/DL (ref 0.9–1.8)
TRIGL SERPL-MCNC: 155 MG/DL (ref 35–150)
TSH SERPL DL<=0.005 MIU/L-ACNC: 2.41 UIU/ML (ref 0.36–3.74)
VLDLC SERPL CALC-MCNC: 31 MG/DL (ref 6–23)
WBC # BLD AUTO: 7.1 K/UL (ref 4.3–11.1)

## 2020-06-15 PROCEDURE — 36415 COLL VENOUS BLD VENIPUNCTURE: CPT

## 2020-06-15 PROCEDURE — 80061 LIPID PANEL: CPT

## 2020-06-15 PROCEDURE — 85027 COMPLETE CBC AUTOMATED: CPT

## 2020-06-15 PROCEDURE — 80053 COMPREHEN METABOLIC PANEL: CPT

## 2020-06-15 PROCEDURE — 84439 ASSAY OF FREE THYROXINE: CPT

## 2020-06-15 PROCEDURE — 84443 ASSAY THYROID STIM HORMONE: CPT

## 2020-06-15 NOTE — PROGRESS NOTES
Lab results: She has a low albumin level as well as an elevated globulin level and a low A/G ratio; unclear cause; this can be caused by a host/multitude of medical issues; however, as a basic primary care physician, I am unclear what could be causing these lab abnormalities; I would recommend a recheck of her metabolic panel in the next 2 to 3 weeks for a lab visit only; with continued lab abnormalities, she may need to see a hematologist; her cholesterol panel has worsened compared to her prior cholesterol panel; she needs to: Eat a well-balanced diet, eat breakfast daily, limit eating out/takeout food, and formally exercise daily; will follow; thank you, CINTHYA

## 2020-07-01 ENCOUNTER — HOSPITAL ENCOUNTER (OUTPATIENT)
Dept: LAB | Age: 40
Discharge: HOME | End: 2020-07-01
Payer: SELF-PAY

## 2020-07-01 LAB
ALBUMIN SERPL-MCNC: 3.1 G/DL (ref 3.5–5)
ALBUMIN/GLOB SERPL: 0.7 {RATIO} (ref 1.2–3.5)
ALP SERPL-CCNC: 78 U/L (ref 50–136)
ALT SERPL-CCNC: 21 U/L (ref 12–65)
ANION GAP SERPL CALC-SCNC: 5 MMOL/L (ref 7–16)
AST SERPL-CCNC: 15 U/L (ref 15–37)
BILIRUB SERPL-MCNC: 0.3 MG/DL (ref 0.2–1.1)
BUN SERPL-MCNC: 11 MG/DL (ref 6–23)
CALCIUM SERPL-MCNC: 8.8 MG/DL (ref 8.3–10.4)
CHLORIDE SERPL-SCNC: 108 MMOL/L (ref 98–107)
CO2 SERPL-SCNC: 28 MMOL/L (ref 21–32)
CREAT SERPL-MCNC: 0.69 MG/DL (ref 0.6–1)
GLOBULIN SER CALC-MCNC: 4.5 G/DL (ref 2.3–3.5)
GLUCOSE SERPL-MCNC: 87 MG/DL (ref 65–100)
POTASSIUM SERPL-SCNC: 4.1 MMOL/L (ref 3.5–5.1)
PROT SERPL-MCNC: 7.6 G/DL (ref 6.3–8.2)
SODIUM SERPL-SCNC: 141 MMOL/L (ref 136–145)

## 2020-07-01 PROCEDURE — 36415 COLL VENOUS BLD VENIPUNCTURE: CPT

## 2020-07-01 PROCEDURE — 80053 COMPREHEN METABOLIC PANEL: CPT

## 2020-07-01 NOTE — PROGRESS NOTES
Lab results: Her liver and kidney function are both within normal limits per lab; she continues to have a low albumin and an elevated globulin level of unclear etiology; this can be due to various medical issues/diagnoses; it can be due to inflammatory, chronic disease, or other rarer/more concerning issues; I would recommend one evaluation with a hematologist; however, I am unclear if this patient has medical insurance at this time; please send this result note back to me and I will place a referral to hematology; thank you, JF

## 2020-10-15 ENCOUNTER — HOSPITAL ENCOUNTER (OUTPATIENT)
Dept: LAB | Age: 40
Discharge: HOME | End: 2020-10-15

## 2020-10-15 LAB
ALBUMIN SERPL-MCNC: 3.4 G/DL (ref 3.5–5)
ALBUMIN/GLOB SERPL: 0.8 {RATIO} (ref 1.2–3.5)
ALP SERPL-CCNC: 79 U/L (ref 50–136)
ALT SERPL-CCNC: 19 U/L (ref 12–65)
ANION GAP SERPL CALC-SCNC: 7 MMOL/L (ref 7–16)
AST SERPL-CCNC: 15 U/L (ref 15–37)
BILIRUB SERPL-MCNC: 0.4 MG/DL (ref 0.2–1.1)
BUN SERPL-MCNC: 9 MG/DL (ref 6–23)
CALCIUM SERPL-MCNC: 9 MG/DL (ref 8.3–10.4)
CHLORIDE SERPL-SCNC: 107 MMOL/L (ref 98–107)
CO2 SERPL-SCNC: 27 MMOL/L (ref 21–32)
CREAT SERPL-MCNC: 0.75 MG/DL (ref 0.6–1)
ERYTHROCYTE [DISTWIDTH] IN BLOOD BY AUTOMATED COUNT: 13.8 % (ref 11.9–14.6)
GLOBULIN SER CALC-MCNC: 4.1 G/DL (ref 2.3–3.5)
GLUCOSE SERPL-MCNC: 83 MG/DL (ref 65–100)
HCT VFR BLD AUTO: 40.1 % (ref 35.8–46.3)
HGB BLD-MCNC: 12.7 G/DL (ref 11.7–15.4)
MCH RBC QN AUTO: 29.1 PG (ref 26.1–32.9)
MCHC RBC AUTO-ENTMCNC: 31.7 G/DL (ref 31.4–35)
MCV RBC AUTO: 92 FL (ref 79.6–97.8)
NRBC # BLD: 0 K/UL (ref 0–0.2)
PLATELET # BLD AUTO: 250 K/UL (ref 150–450)
PMV BLD AUTO: 11 FL (ref 9.4–12.3)
POTASSIUM SERPL-SCNC: 3.9 MMOL/L (ref 3.5–5.1)
PROT SERPL-MCNC: 7.5 G/DL (ref 6.3–8.2)
RBC # BLD AUTO: 4.36 M/UL (ref 4.05–5.2)
SODIUM SERPL-SCNC: 141 MMOL/L (ref 136–145)
T4 FREE SERPL-MCNC: 1.3 NG/DL (ref 0.78–1.46)
TSH SERPL DL<=0.005 MIU/L-ACNC: 1.45 UIU/ML (ref 0.36–3.74)
WBC # BLD AUTO: 6.7 K/UL (ref 4.3–11.1)

## 2020-10-15 PROCEDURE — 85027 COMPLETE CBC AUTOMATED: CPT

## 2020-10-15 PROCEDURE — 84439 ASSAY OF FREE THYROXINE: CPT

## 2020-10-15 PROCEDURE — 80053 COMPREHEN METABOLIC PANEL: CPT

## 2020-10-15 PROCEDURE — 84443 ASSAY THYROID STIM HORMONE: CPT

## 2020-10-15 PROCEDURE — 36415 COLL VENOUS BLD VENIPUNCTURE: CPT

## 2020-10-15 NOTE — PROGRESS NOTES
Lab results: She continues to have a low albumin and elevated globulin of unclear etiology; this is somewhat concerning and I do recommend she see Dr. Kleber Louise for further evaluation; will update a referral back to him for evaluation as I do not know what is causing these lab abnormalities; her CBC at this time is within normal limits; my recommendation is that she needs to be evaluated prior to December; thank you, Dr. Mio Valenzuela

## 2020-11-12 ENCOUNTER — HOSPITAL ENCOUNTER (OUTPATIENT)
Dept: LAB | Age: 40
Discharge: HOME | End: 2020-11-12

## 2020-11-12 DIAGNOSIS — D50.9 IRON DEFICIENCY ANEMIA, UNSPECIFIED IRON DEFICIENCY ANEMIA TYPE: ICD-10-CM

## 2020-11-12 DIAGNOSIS — E53.8 VITAMIN B12 DEFICIENCY: ICD-10-CM

## 2020-11-12 LAB
ALBUMIN SERPL-MCNC: 3.6 G/DL (ref 3.5–5)
ALBUMIN/GLOB SERPL: 0.9 {RATIO} (ref 1.2–3.5)
ALP SERPL-CCNC: 67 U/L (ref 50–136)
ALT SERPL-CCNC: 17 U/L (ref 12–65)
ANION GAP SERPL CALC-SCNC: 5 MMOL/L (ref 7–16)
AST SERPL-CCNC: 14 U/L (ref 15–37)
BASOPHILS # BLD: 0 K/UL (ref 0–0.2)
BASOPHILS NFR BLD: 1 % (ref 0–2)
BILIRUB SERPL-MCNC: 0.3 MG/DL (ref 0.2–1.1)
BUN SERPL-MCNC: 8 MG/DL (ref 6–23)
CALCIUM SERPL-MCNC: 9.2 MG/DL (ref 8.3–10.4)
CHLORIDE SERPL-SCNC: 108 MMOL/L (ref 98–107)
CO2 SERPL-SCNC: 26 MMOL/L (ref 21–32)
CREAT SERPL-MCNC: 0.8 MG/DL (ref 0.6–1)
DIFFERENTIAL METHOD BLD: NORMAL
EOSINOPHIL # BLD: 0.3 K/UL (ref 0–0.8)
EOSINOPHIL NFR BLD: 3 % (ref 0.5–7.8)
ERYTHROCYTE [DISTWIDTH] IN BLOOD BY AUTOMATED COUNT: 14.1 % (ref 11.9–14.6)
FERRITIN SERPL-MCNC: 104 NG/ML (ref 8–388)
GLOBULIN SER CALC-MCNC: 3.9 G/DL (ref 2.3–3.5)
GLUCOSE SERPL-MCNC: 84 MG/DL (ref 65–100)
HCT VFR BLD AUTO: 39.6 % (ref 35.8–46.3)
HGB BLD-MCNC: 13.1 G/DL (ref 11.7–15.4)
HGB RETIC QN AUTO: 32 PG (ref 29–35)
IMM GRANULOCYTES # BLD AUTO: 0 K/UL (ref 0–0.5)
IMM GRANULOCYTES NFR BLD AUTO: 1 % (ref 0–5)
IMM RETICS NFR: 10.9 % (ref 3–15.9)
IRON SATN MFR SERPL: 14 %
IRON SERPL-MCNC: 40 UG/DL (ref 35–150)
LYMPHOCYTES # BLD: 2 K/UL (ref 0.5–4.6)
LYMPHOCYTES NFR BLD: 25 % (ref 13–44)
MCH RBC QN AUTO: 29.9 PG (ref 26.1–32.9)
MCHC RBC AUTO-ENTMCNC: 33.1 G/DL (ref 31.4–35)
MCV RBC AUTO: 90.4 FL (ref 79.6–97.8)
MONOCYTES # BLD: 0.6 K/UL (ref 0.1–1.3)
MONOCYTES NFR BLD: 7 % (ref 4–12)
NEUTS SEG # BLD: 5.2 K/UL (ref 1.7–8.2)
NEUTS SEG NFR BLD: 64 % (ref 43–78)
NRBC # BLD: 0 K/UL (ref 0–0.2)
PLATELET # BLD AUTO: 227 K/UL (ref 150–450)
PMV BLD AUTO: 11 FL (ref 9.4–12.3)
POTASSIUM SERPL-SCNC: 4.2 MMOL/L (ref 3.5–5.1)
PROT SERPL-MCNC: 7.5 G/DL (ref 6.3–8.2)
RBC # BLD AUTO: 4.38 M/UL (ref 4.05–5.25)
RETICS # AUTO: 0.08 M/UL (ref 0.03–0.1)
RETICS/RBC NFR AUTO: 1.8 % (ref 0.3–2)
SODIUM SERPL-SCNC: 139 MMOL/L (ref 136–145)
TIBC SERPL-MCNC: 295 UG/DL (ref 250–450)
VIT B12 SERPL-MCNC: 151 PG/ML (ref 193–986)
WBC # BLD AUTO: 8.1 K/UL (ref 4.3–11.1)

## 2020-11-12 PROCEDURE — 82607 VITAMIN B-12: CPT

## 2020-11-12 PROCEDURE — 82747 ASSAY OF FOLIC ACID RBC: CPT

## 2020-11-12 PROCEDURE — 36415 COLL VENOUS BLD VENIPUNCTURE: CPT

## 2020-11-12 PROCEDURE — 85025 COMPLETE CBC W/AUTO DIFF WBC: CPT

## 2020-11-12 PROCEDURE — 85046 RETICYTE/HGB CONCENTRATE: CPT

## 2020-11-12 PROCEDURE — 80053 COMPREHEN METABOLIC PANEL: CPT

## 2020-11-12 PROCEDURE — 82728 ASSAY OF FERRITIN: CPT

## 2020-11-12 PROCEDURE — 83540 ASSAY OF IRON: CPT

## 2020-11-13 LAB
FOLATE BLD-MCNC: 417 NG/ML
FOLATE RBC-MCNC: 1064 NG/ML
HCT VFR BLD AUTO: 39.2 % (ref 34–46.6)

## 2020-11-18 PROBLEM — E53.8 LOW SERUM VITAMIN B12: Status: ACTIVE | Noted: 2020-11-18

## 2021-02-24 ENCOUNTER — TRANSCRIBE ORDER (OUTPATIENT)
Dept: SCHEDULING | Age: 41
End: 2021-02-24

## 2021-02-24 DIAGNOSIS — Z12.31 SCREENING MAMMOGRAM FOR HIGH-RISK PATIENT: Primary | ICD-10-CM

## 2021-03-05 ENCOUNTER — APPOINTMENT (OUTPATIENT)
Dept: INFUSION THERAPY | Age: 41
End: 2021-03-05

## 2021-03-16 ENCOUNTER — APPOINTMENT (OUTPATIENT)
Dept: CT IMAGING | Age: 41
End: 2021-03-16
Attending: EMERGENCY MEDICINE
Payer: MEDICAID

## 2021-03-16 ENCOUNTER — HOSPITAL ENCOUNTER (EMERGENCY)
Age: 41
Discharge: HOME | End: 2021-03-16
Attending: EMERGENCY MEDICINE
Payer: MEDICAID

## 2021-03-16 VITALS
BODY MASS INDEX: 50.02 KG/M2 | HEIGHT: 64 IN | WEIGHT: 293 LBS | SYSTOLIC BLOOD PRESSURE: 138 MMHG | OXYGEN SATURATION: 99 % | RESPIRATION RATE: 18 BRPM | HEART RATE: 70 BPM | TEMPERATURE: 97.5 F | DIASTOLIC BLOOD PRESSURE: 89 MMHG

## 2021-03-16 DIAGNOSIS — G44.89 OTHER HEADACHE SYNDROME: Primary | ICD-10-CM

## 2021-03-16 DIAGNOSIS — S09.90XA TRAUMATIC INJURY OF HEAD, INITIAL ENCOUNTER: ICD-10-CM

## 2021-03-16 LAB
ALBUMIN SERPL-MCNC: 3.4 G/DL (ref 3.5–5)
ALBUMIN/GLOB SERPL: 0.8 {RATIO} (ref 1.2–3.5)
ALP SERPL-CCNC: 73 U/L (ref 50–136)
ALT SERPL-CCNC: 18 U/L (ref 12–65)
ANION GAP SERPL CALC-SCNC: 5 MMOL/L (ref 7–16)
AST SERPL-CCNC: 14 U/L (ref 15–37)
BASOPHILS # BLD: 0 K/UL (ref 0–0.2)
BASOPHILS NFR BLD: 1 % (ref 0–2)
BILIRUB SERPL-MCNC: 0.3 MG/DL (ref 0.2–1.1)
BUN SERPL-MCNC: 10 MG/DL (ref 6–23)
CALCIUM SERPL-MCNC: 9.3 MG/DL (ref 8.3–10.4)
CHLORIDE SERPL-SCNC: 108 MMOL/L (ref 98–107)
CO2 SERPL-SCNC: 26 MMOL/L (ref 21–32)
CREAT SERPL-MCNC: 0.66 MG/DL (ref 0.6–1)
DIFFERENTIAL METHOD BLD: NORMAL
EOSINOPHIL # BLD: 0.2 K/UL (ref 0–0.8)
EOSINOPHIL NFR BLD: 2 % (ref 0.5–7.8)
ERYTHROCYTE [DISTWIDTH] IN BLOOD BY AUTOMATED COUNT: 14.3 % (ref 11.9–14.6)
GLOBULIN SER CALC-MCNC: 4.2 G/DL (ref 2.3–3.5)
GLUCOSE SERPL-MCNC: 78 MG/DL (ref 65–100)
HCT VFR BLD AUTO: 40.8 % (ref 35.8–46.3)
HGB BLD-MCNC: 13.4 G/DL (ref 11.7–15.4)
IMM GRANULOCYTES # BLD AUTO: 0 K/UL (ref 0–0.5)
IMM GRANULOCYTES NFR BLD AUTO: 0 % (ref 0–5)
LYMPHOCYTES # BLD: 1.9 K/UL (ref 0.5–4.6)
LYMPHOCYTES NFR BLD: 27 % (ref 13–44)
MCH RBC QN AUTO: 29.4 PG (ref 26.1–32.9)
MCHC RBC AUTO-ENTMCNC: 32.8 G/DL (ref 31.4–35)
MCV RBC AUTO: 89.5 FL (ref 79.6–97.8)
MONOCYTES # BLD: 0.5 K/UL (ref 0.1–1.3)
MONOCYTES NFR BLD: 7 % (ref 4–12)
NEUTS SEG # BLD: 4.4 K/UL (ref 1.7–8.2)
NEUTS SEG NFR BLD: 64 % (ref 43–78)
NRBC # BLD: 0 K/UL (ref 0–0.2)
PLATELET # BLD AUTO: 250 K/UL (ref 150–450)
PMV BLD AUTO: 11 FL (ref 9.4–12.3)
POTASSIUM SERPL-SCNC: 4.2 MMOL/L (ref 3.5–5.1)
PROT SERPL-MCNC: 7.6 G/DL (ref 6.3–8.2)
RBC # BLD AUTO: 4.56 M/UL (ref 4.05–5.2)
SODIUM SERPL-SCNC: 139 MMOL/L (ref 136–145)
WBC # BLD AUTO: 7 K/UL (ref 4.3–11.1)

## 2021-03-16 PROCEDURE — 85025 COMPLETE CBC W/AUTO DIFF WBC: CPT

## 2021-03-16 PROCEDURE — 99283 EMERGENCY DEPT VISIT LOW MDM: CPT

## 2021-03-16 PROCEDURE — 80053 COMPREHEN METABOLIC PANEL: CPT

## 2021-03-16 PROCEDURE — 70450 CT HEAD/BRAIN W/O DYE: CPT

## 2021-03-16 RX ORDER — IBUPROFEN 800 MG/1
800 TABLET ORAL
Status: DISCONTINUED | OUTPATIENT
Start: 2021-03-16 | End: 2021-03-16 | Stop reason: HOSPADM

## 2021-03-16 NOTE — ED PROVIDER NOTES
Patient complains of headache. States she choked on some coffee at work yesterday which caused her to faint and fall backwards striking her head on the floor and her right  Leg on a chair. Had a headache after the fall. Took 4 Advil at 9:30 am yesterday. None since. Still with occipital headache. No nausea or vomiting. No weakness or numbness.              Past Medical History:   Diagnosis Date    Anemia     Fibromyalgia     managed with medication    GERD (gastroesophageal reflux disease) 3/25/2014    H/O seasonal allergies     Hypothyroidism 3/25/2014    managed with medication    Migraine 3/25/2014    managed with medication    Morbid obesity ([address] 3/25/2014    Oligomenorrhea 3/25/2014    Ovarian cyst 3/25/2014    PCOS (polycystic ovarian syndrome)     managed with metformin    Vitamin D deficiency     managed with medication       Past Surgical History:   Procedure Laterality Date    HX GASTRECTOMY  7/29/15    sleeve         Family History:   Problem Relation Age of Onset    Diabetes Father     Hypertension Father     Hypertension Mother     COPD Mother     Diabetes Paternal Grandmother     Other Paternal Grandmother         thalassemia    Cancer Paternal Grandfather         prostate    Coronary Artery Disease Other         unknown family member   [address] [place] [address] Cancer Other         unknown aunt (melanoma)       Social History     Socioeconomic History    Marital status: SINGLE     Spouse name: Not on file    Number of children: Not on file    Years of education: Not on file    Highest education level: Not on file   Occupational History    Not on file   Social Needs    Financial resource strain: Not on file    Food insecurity     Worry: Not on file     Inability: Not on file    Transportation needs     Medical: Not on file     Non-medical: Not on file   Tobacco Use    Smoking status: Current Every Day Smoker     Packs/day: 1.00     Years: 4.00     Pack years: 4.00     Last attempt to quit: 2008     Years since quittin.1    Smokeless tobacco: Never Used   Substance and Sexual Activity    Alcohol use: No    Drug use: No    Sexual activity: Yes     Partners: Male     Birth control/protection: Inserts     Comment: vaginal film   Lifestyle    Physical activity     Days per week: Not on file     Minutes per session: Not on file    Stress: Not on file   Relationships    Social connections     Talks on phone: Not on file     Gets together: Not on file     Attends Yazdanism service: Not on file     Active member of club or organization: Not on file     Attends meetings of clubs or organizations: Not on file     Relationship status: Not on file    Intimate partner violence     Fear of current or ex partner: Not on file     Emotionally abused: Not on file     Physically abused: Not on file     Forced sexual activity: Not on file   Other Topics Concern     Service Not Asked    Blood Transfusions Not Asked    Caffeine Concern Not Asked    Occupational Exposure Not Asked   Dewanda Danielle Hazards Not Asked    Sleep Concern Not Asked    Stress Concern Not Asked    Weight Concern Not Asked    Special Diet Not Asked    Back Care Not Asked    Exercise Not Asked    Bike Helmet Not Asked    Seat Belt Yes    Self-Exams Not Asked   Social History Narrative    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         ALLERGIES: Excedrin p.m. [diphenhydramine-acetaminophen], Excedrin [acetaminophen-caffeine], Nabumetone, and Pepto-bismol [bismuth subsalicylate]    Review of Systems   Constitutional: Negative. HENT: Negative. Respiratory: Negative. Cardiovascular: Negative. Gastrointestinal: Negative. Genitourinary: Negative. Musculoskeletal: Negative. Skin:        Bruise right leg   Neurological: Positive for headaches.        Vitals:    21 1241 21 1301 21 1305   BP: (!) 148/96 (!) 140/100    Pulse: 72 75    Resp: 18     Temp: 97.5 °F (36.4 °C)     SpO2: 97% 96% 96%   Weight: 136.1 kg (300 lb)     Height: 5' 4\" (1.626 m)              Physical Exam  Constitutional:       Appearance: She is well-developed. She is obese. HENT:      Head: Normocephalic. Comments: There is a 3 cm area of swelling left parieto occipital area. Right Ear: Tympanic membrane and external ear normal.      Left Ear: Tympanic membrane and external ear normal.      Nose: Nose normal.      Mouth/Throat:      Mouth: Mucous membranes are moist.      Pharynx: Oropharynx is clear. Eyes:      Conjunctiva/sclera: Conjunctivae normal.      Pupils: Pupils are equal, round, and reactive to light. Neck:      Musculoskeletal: Normal range of motion and neck supple. Vascular: No JVD. Cardiovascular:      Rate and Rhythm: Normal rate and regular rhythm. Heart sounds: Normal heart sounds. Pulmonary:      Effort: Pulmonary effort is normal.      Breath sounds: Normal breath sounds. Musculoskeletal: Normal range of motion. General: No tenderness. Comments: Ecchymosis right lower leg anterio medial aspect 5 cm. No bony tenderness. Skin:     General: Skin is warm and dry. Findings: Bruising (right leg) present. Neurological:      General: No focal deficit present. Mental Status: She is alert and oriented to person, place, and time. Psychiatric:         Mood and Affect: Mood normal.         Behavior: Behavior normal.          MDM  Number of Diagnoses or Management Options  Other headache syndrome  Traumatic injury of head, initial encounter  Diagnosis management comments: Head trauma after fainting  CT head - normal  Labs reviewed  Advil 800 mg po - she took from her own supply while waiting  Results and instructions discussed with patient  Follow up with PCP  Return with new or worse symptoms.        Amount and/or Complexity of Data Reviewed  Clinical lab tests: ordered and reviewed  Tests in the radiology section of CPT®: ordered and reviewed    Patient Progress  Patient progress: stable         Procedures

## 2021-03-16 NOTE — ED NOTES
I have reviewed discharge instructions with the patient. The patient verbalized understanding. Patient left ED via Discharge Method: ambulatory to Home with family. Opportunity for questions and clarification provided. Patient given 0 scripts. To continue your aftercare when you leave the hospital, you may receive an automated call from our care team to check in on how you are doing. This is a free service and part of our promise to provide the best care and service to meet your aftercare needs.  If you have questions, or wish to unsubscribe from this service please call [phone]. Thank you for Choosing our [address] [place] [address] Emergency Department.

## 2021-03-16 NOTE — ED TRIAGE NOTES
Pt arrived via POV c/o headache after a fall yesterday. Pt states that she had a coughing fit yesterday and passed out and hit the back of her head on the floor. Reports a knot to the left side of head and c/o headache on right side.  Denies NVD, vision changes, shob, cp, dizziness

## 2021-03-31 ENCOUNTER — HOSPITAL ENCOUNTER (OUTPATIENT)
Dept: MAMMOGRAPHY | Age: 41
Discharge: HOME | End: 2021-03-31
Attending: FAMILY MEDICINE

## 2021-03-31 DIAGNOSIS — Z12.31 SCREENING MAMMOGRAM FOR HIGH-RISK PATIENT: ICD-10-CM

## 2021-03-31 PROCEDURE — 77067 SCR MAMMO BI INCL CAD: CPT

## 2021-03-31 NOTE — PROGRESS NOTES
Greening mammogram results per radiologist: \"IMPRESSION  1. Outer right breast masslike density and medial left breast focal asymmetry  only seen on the CC view. Further evaluation of the right breast mass with  diagnostic ultrasound, and further evaluation of the left breast asymmetry with  focal compression diagnostic mammogram, and ultrasound if indicated is  Recommended. \"  She needs a diagnostic bilateral mammogram and bilateral breast ultrasound; orders placed; if she has not heard regarding these important follow-up imaging tests in the next 1 to 2 days, she needs to call our office and let us know; thank you, Dr. Lu

## 2021-04-02 ENCOUNTER — APPOINTMENT (OUTPATIENT)
Dept: INFUSION THERAPY | Age: 41
End: 2021-04-02

## 2021-04-07 ENCOUNTER — HOSPITAL ENCOUNTER (OUTPATIENT)
Dept: MAMMOGRAPHY | Age: 41
Discharge: HOME | End: 2021-04-07
Attending: FAMILY MEDICINE

## 2021-04-07 DIAGNOSIS — R92.8 ABNORMAL SCREENING MAMMOGRAM: ICD-10-CM

## 2021-04-07 PROCEDURE — 77066 DX MAMMO INCL CAD BI: CPT

## 2021-04-07 PROCEDURE — 76642 ULTRASOUND BREAST LIMITED: CPT

## 2021-04-08 NOTE — PROGRESS NOTES
Her follow-up bilateral diagnostic mammogram and bilateral breast ultrasound per radiologist: \"IMPRESSION     1.  RIGHT 9:00 DEEP RETROAREOLAR CYST.     2. LEFT 10:00 SUBCENTIMETER OVAL NODULE IS ALSO VERY LIKELY BENIGN, POSSIBLY AN  INTRAMAMMARY LYMPH NODE. FOLLOW-UP LEFT BREAST ULTRASOUND IS RECOMMENDED IN 6  MONTHS, UNLESS FURTHER EVALUATION IS CLINICALLY INDICATED SOONER. \"  If she has any bilateral breast discomfort, breast skin changes, nipple changes, nipple discharge, or any other concerning breast issues, she must see a breast surgeon as soon as possible; if she exhibits any of the above symptomatic complaints, please send this result note back to me immediately and I will place a referral to breast surgery; thank you, Dr. Lu

## 2021-05-07 ENCOUNTER — APPOINTMENT (OUTPATIENT)
Dept: INFUSION THERAPY | Age: 41
End: 2021-05-07

## 2021-05-19 ENCOUNTER — HOSPITAL ENCOUNTER (OUTPATIENT)
Dept: LAB | Age: 41
Discharge: HOME | End: 2021-05-19
Payer: MEDICAID

## 2021-05-19 LAB
ALBUMIN SERPL-MCNC: 3.6 G/DL (ref 3.5–5)
ALBUMIN/GLOB SERPL: 0.9 {RATIO} (ref 1.2–3.5)
ALP SERPL-CCNC: 67 U/L (ref 50–136)
ALT SERPL-CCNC: 22 U/L (ref 12–65)
ANION GAP SERPL CALC-SCNC: 5 MMOL/L (ref 7–16)
AST SERPL-CCNC: 11 U/L (ref 15–37)
BILIRUB SERPL-MCNC: 0.3 MG/DL (ref 0.2–1.1)
BUN SERPL-MCNC: 7 MG/DL (ref 6–23)
CALCIUM SERPL-MCNC: 9 MG/DL (ref 8.3–10.4)
CHLORIDE SERPL-SCNC: 106 MMOL/L (ref 98–107)
CHOLEST SERPL-MCNC: 211 MG/DL
CO2 SERPL-SCNC: 28 MMOL/L (ref 21–32)
CREAT SERPL-MCNC: 0.74 MG/DL (ref 0.6–1)
ERYTHROCYTE [DISTWIDTH] IN BLOOD BY AUTOMATED COUNT: 14.4 % (ref 11.9–14.6)
GLOBULIN SER CALC-MCNC: 3.8 G/DL (ref 2.3–3.5)
GLUCOSE SERPL-MCNC: 80 MG/DL (ref 65–100)
HCT VFR BLD AUTO: 39.1 % (ref 35.8–46.3)
HDLC SERPL-MCNC: 47 MG/DL (ref 40–60)
HDLC SERPL: 4.5 {RATIO}
HGB BLD-MCNC: 12.6 G/DL (ref 11.7–15.4)
LDLC SERPL CALC-MCNC: 134.4 MG/DL
MCH RBC QN AUTO: 29 PG (ref 26.1–32.9)
MCHC RBC AUTO-ENTMCNC: 32.2 G/DL (ref 31.4–35)
MCV RBC AUTO: 90.1 FL (ref 79.6–97.8)
NRBC # BLD: 0 K/UL (ref 0–0.2)
PLATELET # BLD AUTO: 256 K/UL (ref 150–450)
PMV BLD AUTO: 10.7 FL (ref 9.4–12.3)
POTASSIUM SERPL-SCNC: 3.8 MMOL/L (ref 3.5–5.1)
PROT SERPL-MCNC: 7.4 G/DL (ref 6.3–8.2)
RBC # BLD AUTO: 4.34 M/UL (ref 4.05–5.2)
SODIUM SERPL-SCNC: 139 MMOL/L (ref 136–145)
T4 FREE SERPL-MCNC: 1.1 NG/DL (ref 0.78–1.46)
TRIGL SERPL-MCNC: 148 MG/DL (ref 35–150)
TSH SERPL DL<=0.005 MIU/L-ACNC: 1.83 UIU/ML (ref 0.36–3.74)
VLDLC SERPL CALC-MCNC: 29.6 MG/DL (ref 6–23)
WBC # BLD AUTO: 9.2 K/UL (ref 4.3–11.1)

## 2021-05-19 PROCEDURE — 85027 COMPLETE CBC AUTOMATED: CPT

## 2021-05-19 PROCEDURE — 84443 ASSAY THYROID STIM HORMONE: CPT

## 2021-05-19 PROCEDURE — 36415 COLL VENOUS BLD VENIPUNCTURE: CPT

## 2021-05-19 PROCEDURE — 80061 LIPID PANEL: CPT

## 2021-05-19 PROCEDURE — 80053 COMPREHEN METABOLIC PANEL: CPT

## 2021-05-19 PROCEDURE — 84439 ASSAY OF FREE THYROXINE: CPT

## 2021-05-19 NOTE — PROGRESS NOTES
She has multiple abnormal labs; she continues to have an elevated globulin which has been chronic and of which she has been referred previously to hematology (she has a history of anemia/abnormal CBC as well); she missed a hematology appointment with Dr. 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   If so, please send this result note back to me; thank you, Dr Meyers Roles

## 2021-05-21 NOTE — PROGRESS NOTES
Reviewed results and patient verbalizes understanding. Pt declined starting statin at this time stating she would like to continue lifestyle changes at this time.

## 2021-06-15 ENCOUNTER — HOSPITAL ENCOUNTER (OUTPATIENT)
Dept: LAB | Age: 41
Discharge: HOME | End: 2021-06-15
Payer: MEDICAID

## 2021-06-15 DIAGNOSIS — D50.9 IRON DEFICIENCY ANEMIA, UNSPECIFIED IRON DEFICIENCY ANEMIA TYPE: ICD-10-CM

## 2021-06-15 LAB
ALBUMIN SERPL-MCNC: 3.7 G/DL (ref 3.5–5)
ALBUMIN/GLOB SERPL: 0.9 {RATIO} (ref 1.2–3.5)
ALP SERPL-CCNC: 82 U/L (ref 50–136)
ALT SERPL-CCNC: 15 U/L (ref 12–65)
ANION GAP SERPL CALC-SCNC: 4 MMOL/L (ref 7–16)
AST SERPL-CCNC: 11 U/L (ref 15–37)
BASOPHILS # BLD: 0 K/UL (ref 0–0.2)
BASOPHILS NFR BLD: 0 % (ref 0–2)
BILIRUB SERPL-MCNC: 0.5 MG/DL (ref 0.2–1.1)
BUN SERPL-MCNC: 12 MG/DL (ref 6–23)
CALCIUM SERPL-MCNC: 9 MG/DL (ref 8.3–10.4)
CHLORIDE SERPL-SCNC: 107 MMOL/L (ref 98–107)
CO2 SERPL-SCNC: 28 MMOL/L (ref 21–32)
CREAT SERPL-MCNC: 0.9 MG/DL (ref 0.6–1)
DIFFERENTIAL METHOD BLD: NORMAL
EOSINOPHIL # BLD: 0.2 K/UL (ref 0–0.8)
EOSINOPHIL NFR BLD: 2 % (ref 0.5–7.8)
ERYTHROCYTE [DISTWIDTH] IN BLOOD BY AUTOMATED COUNT: 14.6 % (ref 11.9–14.6)
FERRITIN SERPL-MCNC: 64 NG/ML (ref 8–388)
GLOBULIN SER CALC-MCNC: 4.3 G/DL (ref 2.3–3.5)
GLUCOSE SERPL-MCNC: 101 MG/DL (ref 65–100)
HCT VFR BLD AUTO: 41.1 % (ref 35.8–46.3)
HGB BLD-MCNC: 13.2 G/DL (ref 11.7–15.4)
HGB RETIC QN AUTO: 32 PG (ref 29–35)
IMM GRANULOCYTES # BLD AUTO: 0 K/UL (ref 0–0.5)
IMM GRANULOCYTES NFR BLD AUTO: 0 % (ref 0–5)
IMM RETICS NFR: 9.5 % (ref 3–15.9)
IRON SATN MFR SERPL: 21 %
IRON SERPL-MCNC: 72 UG/DL (ref 35–150)
LYMPHOCYTES # BLD: 2.6 K/UL (ref 0.5–4.6)
LYMPHOCYTES NFR BLD: 26 % (ref 13–44)
MCH RBC QN AUTO: 29 PG (ref 26.1–32.9)
MCHC RBC AUTO-ENTMCNC: 32.1 G/DL (ref 31.4–35)
MCV RBC AUTO: 90.3 FL (ref 79.6–97.8)
MONOCYTES # BLD: 0.8 K/UL (ref 0.1–1.3)
MONOCYTES NFR BLD: 8 % (ref 4–12)
NEUTS SEG # BLD: 6.3 K/UL (ref 1.7–8.2)
NEUTS SEG NFR BLD: 64 % (ref 43–78)
NRBC # BLD: 0 K/UL (ref 0–0.2)
PLATELET # BLD AUTO: 281 K/UL (ref 150–450)
PMV BLD AUTO: 10.4 FL (ref 9.4–12.3)
POTASSIUM SERPL-SCNC: 4 MMOL/L (ref 3.5–5.1)
PROT SERPL-MCNC: 8 G/DL (ref 6.3–8.2)
RBC # BLD AUTO: 4.55 M/UL (ref 4.05–5.2)
RETICS # AUTO: 0.07 M/UL (ref 0.03–0.1)
RETICS/RBC NFR AUTO: 1.5 % (ref 0.3–2)
SODIUM SERPL-SCNC: 139 MMOL/L (ref 136–145)
TIBC SERPL-MCNC: 335 UG/DL (ref 250–450)
WBC # BLD AUTO: 9.9 K/UL (ref 4.3–11.1)

## 2021-06-15 PROCEDURE — 36415 COLL VENOUS BLD VENIPUNCTURE: CPT

## 2021-06-15 PROCEDURE — 80053 COMPREHEN METABOLIC PANEL: CPT

## 2021-06-15 PROCEDURE — 85025 COMPLETE CBC W/AUTO DIFF WBC: CPT

## 2021-06-15 PROCEDURE — 83540 ASSAY OF IRON: CPT

## 2021-06-15 PROCEDURE — 82728 ASSAY OF FERRITIN: CPT

## 2021-06-15 PROCEDURE — 85046 RETICYTE/HGB CONCENTRATE: CPT

## 2021-12-02 ENCOUNTER — HOSPITAL ENCOUNTER (OUTPATIENT)
Dept: LAB | Age: 41
Discharge: HOME | End: 2021-12-02
Payer: COMMERCIAL

## 2021-12-02 DIAGNOSIS — E53.8 FOLIC ACID DEFICIENCY: ICD-10-CM

## 2021-12-02 DIAGNOSIS — Z98.84 H/O GASTRIC BYPASS: ICD-10-CM

## 2021-12-02 LAB
ALBUMIN SERPL-MCNC: 3.5 G/DL (ref 3.5–5)
ALBUMIN/GLOB SERPL: 0.8 {RATIO} (ref 1.2–3.5)
ALP SERPL-CCNC: 84 U/L (ref 50–136)
ALT SERPL-CCNC: 18 U/L (ref 12–65)
ANION GAP SERPL CALC-SCNC: 3 MMOL/L (ref 7–16)
AST SERPL-CCNC: 12 U/L (ref 15–37)
BASOPHILS # BLD: 0 K/UL (ref 0–0.2)
BASOPHILS NFR BLD: 0 % (ref 0–2)
BILIRUB SERPL-MCNC: 0.3 MG/DL (ref 0.2–1.1)
BUN SERPL-MCNC: 12 MG/DL (ref 6–23)
CALCIUM SERPL-MCNC: 8.8 MG/DL (ref 8.3–10.4)
CHLORIDE SERPL-SCNC: 108 MMOL/L (ref 98–107)
CO2 SERPL-SCNC: 26 MMOL/L (ref 21–32)
CREAT SERPL-MCNC: 0.8 MG/DL (ref 0.6–1)
CRP SERPL-MCNC: 1.7 MG/DL (ref 0–0.9)
DIFFERENTIAL METHOD BLD: ABNORMAL
EOSINOPHIL # BLD: 0.1 K/UL (ref 0–0.8)
EOSINOPHIL NFR BLD: 1 % (ref 0.5–7.8)
ERYTHROCYTE [DISTWIDTH] IN BLOOD BY AUTOMATED COUNT: 15.1 % (ref 11.9–14.6)
ERYTHROCYTE [SEDIMENTATION RATE] IN BLOOD: 46 MM/HR (ref 0–20)
FERRITIN SERPL-MCNC: 15 NG/ML (ref 8–388)
FOLATE SERPL-MCNC: 28.8 NG/ML (ref 3.1–17.5)
GLOBULIN SER CALC-MCNC: 4.2 G/DL (ref 2.3–3.5)
GLUCOSE SERPL-MCNC: 105 MG/DL (ref 65–100)
HCT VFR BLD AUTO: 37 % (ref 35.8–46.3)
HGB BLD-MCNC: 12 G/DL (ref 11.7–15.4)
HGB RETIC QN AUTO: 32 PG (ref 29–35)
IMM GRANULOCYTES # BLD AUTO: 0 K/UL (ref 0–0.5)
IMM GRANULOCYTES NFR BLD AUTO: 1 % (ref 0–5)
IMM RETICS NFR: 13.3 % (ref 3–15.9)
IRON SATN MFR SERPL: 9 %
IRON SERPL-MCNC: 36 UG/DL (ref 35–150)
LYMPHOCYTES # BLD: 1.8 K/UL (ref 0.5–4.6)
LYMPHOCYTES NFR BLD: 22 % (ref 13–44)
MCH RBC QN AUTO: 28 PG (ref 26.1–32.9)
MCHC RBC AUTO-ENTMCNC: 32.4 G/DL (ref 31.4–35)
MCV RBC AUTO: 86.2 FL (ref 79.6–97.8)
MONOCYTES # BLD: 0.5 K/UL (ref 0.1–1.3)
MONOCYTES NFR BLD: 6 % (ref 4–12)
NEUTS SEG # BLD: 5.9 K/UL (ref 1.7–8.2)
NEUTS SEG NFR BLD: 70 % (ref 43–78)
NRBC # BLD: 0 K/UL (ref 0–0.2)
PLATELET # BLD AUTO: 267 K/UL (ref 150–450)
PMV BLD AUTO: 10.6 FL (ref 9.4–12.3)
POTASSIUM SERPL-SCNC: 3.9 MMOL/L (ref 3.5–5.1)
PROT SERPL-MCNC: 7.7 G/DL (ref 6.3–8.2)
RBC # BLD AUTO: 4.29 M/UL (ref 4.05–5.2)
RETICS # AUTO: 0.08 M/UL (ref 0.03–0.1)
RETICS/RBC NFR AUTO: 2 % (ref 0.3–2)
SODIUM SERPL-SCNC: 137 MMOL/L (ref 136–145)
TIBC SERPL-MCNC: 386 UG/DL (ref 250–450)
VIT B12 SERPL-MCNC: 163 PG/ML (ref 193–986)
WBC # BLD AUTO: 8.4 K/UL (ref 4.3–11.1)

## 2021-12-02 PROCEDURE — 83540 ASSAY OF IRON: CPT

## 2021-12-02 PROCEDURE — 85652 RBC SED RATE AUTOMATED: CPT

## 2021-12-02 PROCEDURE — 86038 ANTINUCLEAR ANTIBODIES: CPT

## 2021-12-02 PROCEDURE — 83883 ASSAY NEPHELOMETRY NOT SPEC: CPT

## 2021-12-02 PROCEDURE — 86200 CCP ANTIBODY: CPT

## 2021-12-02 PROCEDURE — 82746 ASSAY OF FOLIC ACID SERUM: CPT

## 2021-12-02 PROCEDURE — 83921 ORGANIC ACID SINGLE QUANT: CPT

## 2021-12-02 PROCEDURE — 85025 COMPLETE CBC W/AUTO DIFF WBC: CPT

## 2021-12-02 PROCEDURE — 85046 RETICYTE/HGB CONCENTRATE: CPT

## 2021-12-02 PROCEDURE — 82728 ASSAY OF FERRITIN: CPT

## 2021-12-02 PROCEDURE — 86140 C-REACTIVE PROTEIN: CPT

## 2021-12-02 PROCEDURE — 86334 IMMUNOFIX E-PHORESIS SERUM: CPT

## 2021-12-02 PROCEDURE — 82784 ASSAY IGA/IGD/IGG/IGM EACH: CPT

## 2021-12-02 PROCEDURE — 83090 ASSAY OF HOMOCYSTEINE: CPT

## 2021-12-02 PROCEDURE — 36415 COLL VENOUS BLD VENIPUNCTURE: CPT

## 2021-12-02 PROCEDURE — 80053 COMPREHEN METABOLIC PANEL: CPT

## 2021-12-02 PROCEDURE — 82607 VITAMIN B-12: CPT

## 2021-12-02 PROCEDURE — 86430 RHEUMATOID FACTOR TEST QUAL: CPT

## 2021-12-03 LAB
ANA SER QL: NEGATIVE
CCP IGA+IGG SERPL IA-ACNC: 8 UNITS (ref 0–19)
HCYS SERPL-SCNC: 24.1 UMOL/L (ref 0–14.5)
KAPPA LC FREE SER-MCNC: 18.21 MG/L (ref 3.3–19.4)
KAPPA LC FREE/LAMBDA FREE SER: 1.34 {RATIO} (ref 0.26–1.65)
LAMBDA LC FREE SERPL-MCNC: 13.64 MG/L (ref 5.71–26.3)
RHEUMATOID FACT SER QL LA: NEGATIVE

## 2021-12-08 LAB
ALBUMIN SERPL ELPH-MCNC: 3.66 G/DL (ref 3.2–5.6)
ALBUMIN/GLOB SERPL: 1 {RATIO}
ALPHA1 GLOB SERPL ELPH-MCNC: 0.25 G/DL (ref 0.1–0.4)
ALPHA2 GLOB SERPL ELPH-MCNC: 0.88 G/DL (ref 0.4–1.2)
B-GLOBULIN SERPL QL ELPH: 1.24 G/DL (ref 0.6–1.3)
GAMMA GLOB MFR SERPL ELPH: 1.28 G/DL (ref 0.5–1.6)
IGA SERPL-MCNC: 277 MG/DL (ref 85–499)
IGG SERPL-MCNC: 1115 MG/DL (ref 610–1616)
IGM SERPL-MCNC: 158 MG/DL (ref 35–242)
Lab: NORMAL
M PROTEIN SERPL ELPH-MCNC: NORMAL G/DL
METHYLMALONATE SERPL-SCNC: 171 NMOL/L (ref 0–378)
PROT PATTERN SERPL ELPH-IMP: NORMAL
PROT PATTERN SPEC IFE-IMP: NORMAL
PROT SERPL-MCNC: 7.3 G/DL (ref 6.3–8.2)

## 2022-01-03 ENCOUNTER — APPOINTMENT (OUTPATIENT)
Dept: PHYSICAL THERAPY | Age: 42
End: 2022-01-03
Payer: COMMERCIAL

## 2022-01-05 ENCOUNTER — HOSPITAL ENCOUNTER (OUTPATIENT)
Dept: PHYSICAL THERAPY | Age: 42
Discharge: HOME | End: 2022-01-05
Payer: COMMERCIAL

## 2022-01-05 PROCEDURE — 97161 PT EVAL LOW COMPLEX 20 MIN: CPT

## 2022-01-05 PROCEDURE — 97110 THERAPEUTIC EXERCISES: CPT

## 2022-01-05 NOTE — THERAPY EVALUATION
Solitario Ceja  : 1980  Primary: [address]  Secondary:  [address] at [facility] Therapy  [address], [facility], [address]  [phone]   [phone]          OUTPATIENT PHYSICAL THERAPY:  Initial Assessment 2022   ICD-10: Treatment Diagnosis: M79.605  Pain in L leg  Precautions/Allergies:   Excedrin p.m. [diphenhydramine-acetaminophen], Excedrin [acetaminophen-caffeine], Nabumetone, and Pepto-bismol [bismuth subsalicylate]     TREATMENT PLAN:  Effective Dates: 2022 TO 2022 (90 days). Frequency/Duration: 2 times a week for 90 Day(s), and upon reassessment will adjust frequency and duration as progress indicates. MEDICAL/REFERRING DIAGNOSIS:  Pain in left leg [M79.605]   DATE OF ONSET: since 2021  REFERRING PHYSICIAN: Charisse Novak MD MD Orders: Eval and Treat    Return MD Appointment: TBD     INITIAL ASSESSMENT:  Ms. Jazzy Gonzalez presents with reports of intermittent tingling and : heaviness\" in the L leg, starting at lateral hip and extending down to lateral shin. It is intermittent, seemingly random, as it can happen in standing, sitting or lying. It seems worse in standing and can be relieved by sitting. She does not mention pain. She has a long history of fibromyalgia, and is familiar with those pains, and this is different. She does not take any meds for this. She seems very weak and deconditioned, and is expected to benefit from PT program with emphasis on specific core and lumbar strengthening, general strengthening in B LEs. PROBLEM LIST (Impacting functional limitations):  1. Decreased Strength  2. Decreased ADL/Functional Activities  3. Decreased Ambulation Ability/Technique  4. Decreased Balance  5. Increased Pain  6. Decreased Activity Tolerance  7. Decreased Flexibility/Joint Mobility  8. Decreased [place] with Home Exercise Program  9.    INTERVENTIONS PLANNED: (Treatment may consist of any combination of the following)  1. Balance Exercise  2. Gait Training  3. Home Exercise Program (HEP)  4. Manual Therapy  5. Neuromuscular Re-education/Strengthening  6. Range of Motion (ROM)  7. Therapeutic Exercise/Strengthening  8. Modalities as needed and appropriate, including Aquatics and taping  9. GOALS: (Goals have been discussed and agreed upon with patient.)  Short-Term Functional Goals: Time Frame: 4 weeks  1. Improve L leg strength to allow 3 x 10 for supine LE ex  2. Pt able to manage 4\" step with L leg, for demonstrated improvement in strength  3. Pt to tolerate walking > 6 min, on treadmill at ~ 1.5 mph  Discharge Goals: Time Frame: 10 weeks  1. Improve pt's tolerance for walking to > 15 min at > 1.5 mph  2. Pt to tolerate single leg stand on each leg x 5 sec  3. Pt to be independent in HEP for lumbar and LE exercises    OUTCOME MEASURE:   Tool Used: Lower Extremity Functional Scale (LEFS)  Score:  Initial:      /80 Most Recent: X/80 (Date: -- )   Interpretation of Score: 20 questions each scored on a 5 point scale with 0 representing \"extreme difficulty or unable to perform\" and 4 representing \"no difficulty\". The lower the score, the greater the functional disability. 80/80 represents no disability. Minimal detectable change is 9 points. MEDICAL NECESSITY:   · Patient is expected to demonstrate progress in strength, balance and functional technique  ·  to decrease tingling and pain in the L leg, and improve ease of mobility. · .  REASON FOR SERVICES/OTHER COMMENTS:  · Patient continues to require skilled intervention due to L leg pain and sensory disturbance. · .  Total Duration:  PT Patient Time In/Time Out  Time In: 0100  Time Out: 0145    Rehabilitation Potential For Stated Goals: Fair  Regarding Manuel Todd's therapy, I certify that the treatment plan above will be carried out by a therapist or under their direction.   Thank you for this referral,  Lisa Sánchez, PT Referring Physician Signature: Bruno Weaver MD _______________________________ Date _____________                                                                                                      Information below was gathered on Initial Assessment--  1-5-2022  PAIN/SUBJECTIVE:   Initial: Pain Intensity 1: 5  Post Session:  5/10   HISTORY:   History of Injury/Illness (Reason for Referral):  Pt reports that her L leg started with this tingling and heaviness in March, for no apparent reason. Minimal at first but has gradually grown more bothersome, to the point of pain, limiting her walking tolerance. She describes the feeling as tingling, like when the leg is asleep and then waking, and heavy. She denies any falls. Symptoms are intermittent, can happen in sitting, standing, walking and lying down. Usually in standing is the worst.  No imaging done, no meds for this new pain. She feels like her altered walking is causing her R hip joint area to hurt. She uses a st cane for long standing balance issues. Past Medical History/Comorbidities:   Ms. Paresh Norman  has a past medical history of Anemia, Fibromyalgia, GERD (gastroesophageal reflux disease) (3/25/2014), H/O seasonal allergies, Hypothyroidism (3/25/2014), Migraine (3/25/2014), Morbid obesity ([address] (3/25/2014), Oligomenorrhea (3/25/2014), Ovarian cyst (3/25/2014), PCOS (polycystic ovarian syndrome), Trichomonosis, and Vitamin D deficiency. Ms. Paresh Norman  has a past surgical history that includes hx gastrectomy (7/29/15). Social History/Living Environment:     lives with a significant other/boyfriend. Lives in 2 level apt, bedroom upstairs. Prior Level of Function/Work/Activity:  Not working . Stopped working a few years ago due to fibormyalgia  Dominant Side:         RIGHT    Personal Factors:          Age:  39 y.o.    Ambulatory/Rehab Services H2 Model Falls Risk Assessment   Risk Factors:       (2)  Any administered antiepileptics/anticonvulsants Ability to Rise from Chair:       (1)  Pushes up, successful in one attempt   Falls Prevention Plan: Mobility Assistance Device (specify):  uses st cane for ambulation   Total: (5 or greater = High Risk): 3   ©2010 [facility] [employer] States Patent #[id number]. Federal Law prohibits the replication, distribution or use without written permission from [facility] [employer]   Current Medications:       Current Outpatient Medications:     cyanocobalamin (VITAMIN B12) 1,000 mcg/mL injection, 1 mL by SubCUTAneous route every thirty (30) days. (Patient not taking: Reported on 12/16/2021), Disp: 3 mL, Rfl: 3    DULoxetine (CYMBALTA) 60 mg capsule, Take 1 capsule by mouth once daily, Disp: 90 Capsule, Rfl: 3    pregabalin (Lyrica) 150 mg capsule, Take 1 Capsule by mouth three (3) times daily. Max Daily Amount: 450 mg. Take 1 cap PO Q 8 hours PRN pain, use sparingly, Disp: 180 Capsule, Rfl: 5    levothyroxine (SYNTHROID) 88 mcg tablet, Take 1 Tablet by mouth Daily (before breakfast). , Disp: 90 Tablet, Rfl: 3    citalopram (CELEXA) 20 mg tablet, Take 1 Tablet by mouth daily. , Disp: 90 Tablet, Rfl: 3    valACYclovir (VALTREX) 1 gram tablet, 2 tabs at onset, then 2 tabs 12 hours later, for a total of 2 doses, NEVER TAKE WITH TIZANIDINE/ZANAFLEX, Disp: 4 Tablet, Rfl: 0    folic acid (FOLVITE) 1 mg tablet, Take 1 tablet by mouth once daily, Disp: 30 Tab, Rfl: 5    ibuprofen (ADVIL) 200 mg tablet, Take  by mouth as needed for Pain., Disp: , Rfl:     LORATADINE (CLARITIN PO), Take  by mouth., Disp: , Rfl:     cholecalciferol (VITAMIN D3) 1,000 unit cap, Take 1,000 Units by mouth two (2) times a day., Disp: , Rfl:     ZOLMitriptan (ZOMIG) 5 mg tablet, Take 1 Tab by mouth as needed for Migraine. , Disp: 6 Tab, Rfl: 5    multivitamin (ONE A DAY) tablet, Take 1 tablet by mouth daily. , Disp: , Rfl:    Date Last Reviewed:  1/5/2022     Number of Personal Factors/Comorbidities that affect the Plan of Care: 3+: HIGH COMPLEXITY   EXAMINATION:   Observation:  Very gregarious, conversant obese 40 yo female. Walks with st cane with min limp on L. She said the cane is long standing, for balance deficit not the new L leg pain. ROM:          Has good spinal mobility in standing--can touch shoe tops and has good lumbar extn  . Repeated flexion does not produce any sx. Strength:          MMT in sitting: min R hip weakness ( 4),     L hip flex and knee extn quality for resistance in MMT is less than on R. Generally strength is 4+. Neurological Screen:        Neural Tension Tests:  negative        Sensation: pt reports intermittent tingling, feeling like the leg is asleep, in the area of the L4-5 dermatome. No sx today at time of assessment. Functional Mobility:         Gait/Ambulation:  Pt ambs with slightly slow pace, with st cane for balance. Has min but noticeable lateral sway. Stairs:  NT but she reports significant difficulty on stairs. Balance:          NT.  Pt does have some obvious balance deficit/unsteadinss, noted while performing trunk mobility activities             Body Structures Involved:  1. Nerves  2. Joints  3. Muscles  4. Ligaments Body Functions Affected:  1. Mental  2. Sensory/Pain  3. Neuromusculoskeletal  4. Movement Related Activities and Participation Affected:  1. General Tasks and Demands  2. Mobility  3. Self Care  4. Domestic Life  5.  Community, Social and [place]   Number of elements (examined above) that affect the Plan of Care: 3: MODERATE COMPLEXITY   CLINICAL PRESENTATION:   Presentation: Evolving clinical presentation with changing clinical characteristics: MODERATE COMPLEXITY   CLINICAL DECISION MAKING:   Use of outcome tool(s) and clinical judgement create a POC that gives a: Questionable prediction of patient's progress: MODERATE COMPLEXITY

## 2022-01-05 NOTE — PROGRESS NOTES
Dori Forman  : 1980  Primary: [address] [facility]  Secondary:  [address] at [place] [address] Therapy  [address], [address]  [phone]   [phone]        OUTPATIENT PHYSICAL THERAPY: Daily Treatment Note 2022  Visit Count:  1    ICD-10: Treatment Diagnosis: M79.605  Pain in L leg    TREATMENT PLAN:  Effective Dates: 2022 TO 2022 (90 days). Pre-treatment Symptoms/Complaints:  Pt reports that she has a good bit of intermittent tingling and heaviness in the L leg, from lateral hip to lateral shin. Pain: Initial: Pain Intensity 1: 5 /10 Post Session:  5/10   Medications Last Reviewed:  2022  Updated Objective Findings:  See evaluation note from today  TREATMENT:     Evaluation  (X)    Therapeutic Exercise   ( 25 min  ):  To decrease pain, improve flexibility and motion, and increase strength. Will provide verbal and manual cues as needed to ensure proper performance of the exercises. Will increase range of motion, resistance and intensity as pt tolerates. Date:   Date:   Date:     Activity/Exercise Parameters Parameters Parameters   Repeated flexion in standing 1 x 10. No pain     Repeated lateral glides 1 x 10, bilat     lower trunk rotation 2 x 10     SLR 3 x 5, Bilat, w/ rests     Prone knee flexion 1 x 10, alternating     Prone press ups 2 x 10                                       [employer] Portal      Access Code: [email or web address]  URL: [email or web address]  Date: 2022  Prepared by: Liya Landin    Exercises  Supine Bridge - 2 x daily - 5 x weekly - 1-3 sets - 10-15 reps - 1-2 hold  Supine Lower Trunk Rotation - 2 x daily - 5 x weekly - 1-3 sets - 10-15 reps - 1-2 hold  Straight Leg Raise - 2 x daily - 5 x weekly - 1-3 sets - 10-15 reps - 1-2 hold  Static Prone on Elbows - 2 x daily - 5 x weekly - 1-3 sets - 10-15 reps - 1-2 hold  Prone Press Up - 2 x daily - 5 x weekly - 1-3 sets - 10-15 reps - 1-2 hold    Treatment/Session Summary:           · Response to Treatment:  Pt did ok with therapy today. She realized just how weak she is when she had difficulty with 5 SLR. Admitted that the easy exercises she did today made her very tired, and that surprised her. PT emphasis will be on prone and lumbar extn ex to help address the sensory disturbance in the L leg, as well as general strengthening and conditioning, with emphasis on core stabilization. ·   .  · Communication/Consultation:  None today  · Equipment provided today:  None today  · Recommendations/Intent for next treatment session: Next visit will focus on improving strength and function L leg, decreasing sensory disturbance, and promoting return to previous level of mobility. Total Treatment Billable Duration:  45 minutes    Jesus  TE 2      Effective Dates: 1/5/2022 TO 4/5/2022 (90 days).     PT Patient Time In/Time Out  Time In: 0100  Time Out: 0145  Ciro Hernandez PT    Future Appointments   Date Time Provider Boris Valencia   1/10/2022  1:45 PM [facility]   1/12/2022  1:00 PM Vinicio Nunez, PT [facility]   1/19/2022  9:30 AM Vinicio Hughes, PT [facility]   1/21/2022  9:30 AM [facility]   1/24/2022 10:15 AM [facility]   1/26/2022 10:15 AM Vinicio Hughes, PT [facility]   2/16/2022  9:20 AM Sondra Sin MD [facility]   3/2/2022  9:50 AM [facility] [employer] [facility]   3/2/2022 10:15 AM Augustin Brown MD [facility]   5/4/2022  9:00 AM DO MOIRA GonsalezNE [facility]

## 2022-01-10 ENCOUNTER — HOSPITAL ENCOUNTER (OUTPATIENT)
Dept: PHYSICAL THERAPY | Age: 42
Discharge: HOME | End: 2022-01-10
Payer: COMMERCIAL

## 2022-01-10 NOTE — PROGRESS NOTES
Griselda Dorado  : 1980  Primary: [address] [facility]  Secondary:  Therapy Center at [facility]  [address], [facility], [address]  [phone]   [phone]      OUTPATIENT DAILY NOTE    NAME/AGE/GENDER: Griselda Dorado is a 39 y.o. female. DATE: 1/10/2022    Ms. Mariza Melgar for today's appointment due to sickness. Chris Hernandez, PTA

## 2022-01-12 ENCOUNTER — HOSPITAL ENCOUNTER (OUTPATIENT)
Dept: PHYSICAL THERAPY | Age: 42
Discharge: HOME | End: 2022-01-12
Payer: COMMERCIAL

## 2022-01-12 NOTE — PROGRESS NOTES
Griselda Dorado  : 1980  Primary: [address] [facility]  Secondary:  Therapy Center at [facility]  [address], [facility], [address]  [phone]   [phone]        OUTPATIENT DAILY NOTE    NAME/AGE/GENDER: Griselda Dorado is a 39 y.o. female. DATE: 2022    Ms. Mariza Melgar for today's appointment due to having a severe flare up, with lots of pain, from her fibromyalgia.  Romana Acosta, PT

## 2022-01-19 ENCOUNTER — HOSPITAL ENCOUNTER (OUTPATIENT)
Dept: PHYSICAL THERAPY | Age: 42
Discharge: HOME | End: 2022-01-19
Payer: COMMERCIAL

## 2022-01-19 PROCEDURE — 97110 THERAPEUTIC EXERCISES: CPT

## 2022-01-19 NOTE — PROGRESS NOTES
Deborah Zhu  : 1980  Primary: [address] [facility]  Secondary:  [address] at [facility]  [address], [facility], [address]  [phone]   [phone]        OUTPATIENT PHYSICAL THERAPY: Daily Treatment Note 2022  Visit Count:  2    ICD-10: Treatment Diagnosis: M79.605  Pain in L leg    TREATMENT PLAN:  Effective Dates: 2022 TO 2022 (90 days). Pre-treatment Symptoms/Complaints:  Pt reports that she feels a little stronger, the exercises are a little bit easier. The tingling in her leg does not seem to go as far down as it used to. Pain: Initial: Pain Intensity 1: 10 Post Session:  4/10   Medications Last Reviewed:  2022  Updated Objective Findings:    TREATMENT:     Therapeutic Exercise   ( 55 min  ):  To decrease pain, improve flexibility and motion, and increase strength. Will provide verbal and manual cues as needed to ensure proper performance of the exercises. Will increase range of motion, resistance and intensity as pt tolerates. Date:   Date:     Date:   Date: Date: Date:   Activity/Exercise Parameters Parameters Parameters      Nustep  6 min, level 1       Repeated flexion in standing 1 x 10. No pain        Repeated lateral glides 1 x 10, bilat        lower trunk rotation 2 x 10 2 x 10       bridging  2x10       Supine marching  2 x 1 min       SLR 3 x 5, Bilat, w/ rests 4x5 bilat       Prone knee flexion 1 x 10, alternating 1 x 10       Prone press ups 2 x 10 2 x 10       Standing lumbar extn  1x10, ball on wall       Repeated sit to stand   2 x 10       UE extension  Blue, 1 x 10                             [employer] Portal      Access Code: [email or web address]  URL: [email or web address]  Date: 2022  Prepared by: Edgardo Roth    Exercises  Supine Bridge - 2 x daily - 5 x weekly - 1-3 sets - 10-15 reps - 1-2 hold  Supine Lower Trunk Rotation - 2 x daily - 5 x weekly - 1-3 sets - 10-15 reps - 1-2 hold  Straight Leg Raise - 2 x daily - 5 x weekly - 1-3 sets - 10-15 reps - 1-2 hold  Static Prone on Elbows - 2 x daily - 5 x weekly - 1-3 sets - 10-15 reps - 1-2 hold  Prone Press Up - 2 x daily - 5 x weekly - 1-3 sets - 10-15 reps - 1-2 hold    Treatment/Session Summary:           · Response to Treatment:  Pt did well with therapy today. Did better with all ex today, but still discussed that they really make her feel weak. But the exercises do not reproduce her pain. PT emphasis will be on prone and lumbar extn ex to help address the sensory disturbance in the L leg, as well as general strengthening and conditioning, with emphasis on core stabilization. ·   .  · Communication/Consultation:  None today  · Equipment provided today:  None today  · Recommendations/Intent for next treatment session: Next visit will focus on improving strength and function L leg, decreasing sensory disturbance, and promoting return to previous level of mobility. Total Treatment Billable Duration:  55  minutes     TE 4      Effective Dates: 1/5/2022 TO 4/5/2022 (90 days).     PT Patient Time In/Time Out  Time In: 0930  Time Out: [address], PT    Future Appointments   Date Time Provider Boris Valencia   1/21/2022  9:30 AM [facility]   1/24/2022 10:15 AM [facility]   1/26/2022 10:15 AM Haroon Osman, PT [facility]   2/16/2022  9:20 AM Antonella Herrera MD [facility]   3/2/2022  9:50 AM [facility] OUTREACH INSURANCE GCCOIG [facility]   3/2/2022 10:15 AM Troy Nuñez MD [facility]   5/4/2022  9:00 AM Stefani Alba DO [facility] BS[facility]

## 2022-01-21 ENCOUNTER — HOSPITAL ENCOUNTER (OUTPATIENT)
Dept: PHYSICAL THERAPY | Age: 42
Discharge: HOME | End: 2022-01-21
Payer: COMMERCIAL

## 2022-01-21 PROCEDURE — 97110 THERAPEUTIC EXERCISES: CPT

## 2022-01-21 NOTE — PROGRESS NOTES
Josselin Cox  : 1980  Primary: [address] [facility]  Secondary:  [address] at [facility]  [address], [facility], [address]  [phone]   [phone]        OUTPATIENT PHYSICAL THERAPY: Daily Treatment Note 2022  Visit Count:  3    ICD-10: Treatment Diagnosis: M79.605  Pain in L leg    TREATMENT PLAN:  Effective Dates: 2022 TO 2022 (90 days). Pre-treatment Symptoms/Complaints:  Patient reports having a lot of hip and lower back pain today. She states she felt it last night and it hasn't subsided this morning. Pain: Initial: Pain Intensity 1: 6 /10 Post Session:  4/10   Medications Last Reviewed:  2022  Updated Objective Findings:    TREATMENT:     Therapeutic Exercise   ( 45 min  ):  To decrease pain, improve flexibility and motion, and increase strength. Will provide verbal and manual cues as needed to ensure proper performance of the exercises. Will increase range of motion, resistance and intensity as pt tolerates. Date:   Date:     Date:   Date: Date: Date:   Activity/Exercise Parameters Parameters Parameters      Nustep  6 min, level 1 10 min level 1      Repeated flexion in standing 1 x 10. No pain        Repeated lateral glides 1 x 10, bilat        lower trunk rotation 2 x 10 2 x 10 2 x 10      bridging  2x10 2 x 10      Supine marching  2 x 1 min 2 x 1 min       SLR 3 x 5, Bilat, w/ rests 4x5 bilat 4x5 bilat      Prone knee flexion 1 x 10, alternating 1 x 10 1 x 10      Prone press ups 2 x 10 2 x 10 2 x 10      Standing lumbar extn  1x10, ball on wall       Repeated sit to stand   2 x 10       UE extension  Blue, 1 x 10       PPT   X 3 with 10 sec holds                   [employer] Portal      Access Code: [email or web address]  URL: [email or web address]  Date: 2022  Prepared by: Connie Berry    Exercises  Supine Bridge - 2 x daily - 5 x weekly - 1-3 sets - 10-15 reps - 1-2 hold  Supine Lower Trunk Rotation - 2 x daily - 5 x weekly - 1-3 sets - 10-15 reps - 1-2 hold  Straight Leg Raise - 2 x daily - 5 x weekly - 1-3 sets - 10-15 reps - 1-2 hold  Static Prone on Elbows - 2 x daily - 5 x weekly - 1-3 sets - 10-15 reps - 1-2 hold  Prone Press Up - 2 x daily - 5 x weekly - 1-3 sets - 10-15 reps - 1-2 hold    Treatment/Session Summary:           · Response to Treatment:  Patient tolerated treatment with mild to moderate discomfort. Discussed with acting PT and patient about trying the pool. Patient states she would like to try the pool at least once a week. ·   .  · Communication/Consultation:  None today  · Equipment provided today:  None today  · Recommendations/Intent for next treatment session: Next visit will focus on improving strength and function L leg, decreasing sensory disturbance, and promoting return to previous level of mobility. Total Treatment Billable Duration:  45  minutes     TE 3      Effective Dates: 1/5/2022 TO 4/5/2022 (90 days).        Nick Dove PTA    Future Appointments   Date Time Provider Boris Valencia   1/24/2022 10:15 AM [facility]   1/26/2022 10:15 AM Kimmie Turner, PT [facility]   2/16/2022  9:20 AM Skip Taylor MD [facility]   3/2/2022  9:50 AM [facility] OUTREACH INSURANCE [facility]   3/2/2022 10:15 AM Betzaida Granados MD [facility]   5/4/2022  9:00 AM Mary Boateng DO BSNE BSNE

## 2022-01-24 ENCOUNTER — HOSPITAL ENCOUNTER (OUTPATIENT)
Dept: PHYSICAL THERAPY | Age: 42
Discharge: HOME | End: 2022-01-24
Payer: COMMERCIAL

## 2022-01-24 PROCEDURE — 97113 AQUATIC THERAPY/EXERCISES: CPT

## 2022-01-24 NOTE — PROGRESS NOTES
Nba Allen  : 1980  Primary: [address] [facility]  Secondary:  [address] at [facility]  [address], [facility], [address]  [phone]   [phone]        OUTPATIENT PHYSICAL THERAPY: Daily Treatment Note 2022  Visit Count:  4    ICD-10: Treatment Diagnosis: M79.605  Pain in L leg    TREATMENT PLAN:  Effective Dates: 2022 TO 2022 (90 days). Pre-treatment Symptoms/Complaints:  Patient reports feeling bad all over today. Pain: Initial: Pain Intensity 1: 7 /10 Post Session:  4/10   Medications Last Reviewed:  2022  Updated Objective Findings:    TREATMENT:     Therapeutic Exercise   (  min  ):  To decrease pain, improve flexibility and motion, and increase strength. Will provide verbal and manual cues as needed to ensure proper performance of the exercises. Will increase range of motion, resistance and intensity as pt tolerates. Date:   Date:     Date:   Date:   Date: Date:   Activity/Exercise Parameters Parameters Parameters      Nustep  6 min, level 1 10 min level 1      Repeated flexion in standing 1 x 10. No pain        Repeated lateral glides 1 x 10, bilat        lower trunk rotation 2 x 10 2 x 10 2 x 10      bridging  2x10 2 x 10      Supine marching  2 x 1 min 2 x 1 min       SLR 3 x 5, Bilat, w/ rests 4x5 bilat 4x5 bilat      Prone knee flexion 1 x 10, alternating 1 x 10 1 x 10      Prone press ups 2 x 10 2 x 10 2 x 10      Standing lumbar extn  1x10, ball on wall       Repeated sit to stand   2 x 10       UE extension  Blue, 1 x 10       PPT   X 3 with 10 sec holds               Aquatic Therapy (45 minutes): Aquatic treatment performed per flow grid for Decreased activity endurance, Decompression, Ease of movement, Low impact and reduced weight bearing activity and increase pain level. .  Cues provided for proper posture and correct body mechaincis.    Patient has difficulty with prolonged Refractive Counseling: I have discussed the options for refractive surgery to decrease dependency on glasses and/or contact lenses. These options include: LASIK, PRK / ASA, ICL, RLE. It was emphasized to the patient that the goal of reducing spectacle dependence not spectacle freedom is more realistic. The patient understands it is possible they will still need a weak spectacle prescription and/or a LASIK enhancement to achieve visual target. standing and walking. Aquatic Exercise Log       Date  1/24 Date   Date   Date   Date     Activity/ Exercise Parameters Parameters Parameters Parameters Parameters   Walking forward 5 min       Walking backward        Walking sideways 3 min         Marching 5 min          Goose Step          Tip toes          Heels          Lunges        Side step squats        LE Exercises          Hip Flex/Ext x30         Hip Abd/Add X 30         SLR X 30         Calf raises X 30         Knee Flex          Squats          Leg Circles          Step Ups X 20       UE Exercises          Squeeze In X 30         Push Down          Pull Down          Bicep/Tricep        Rows/Press outs         Chi Positions          Trunk Rotation        Deep H2O/ Noodles          Stabilization          Arms only          Legs only        Jain Magiure walk        Lower abdominal   work           Cardio          Jogging        Lap   Swimming          Stretches          Hamstrings          Heelcords          Piriformis          Neck              [employer] Portal      Access Code: [email or web address]  URL: [email or web address]  Date: 01/05/2022  Prepared by: Sanford Lockett    Exercises  Supine Bridge - 2 x daily - 5 x weekly - 1-3 sets - 10-15 reps - 1-2 hold  Supine Lower Trunk Rotation - 2 x daily - 5 x weekly - 1-3 sets - 10-15 reps - 1-2 hold  Straight Leg Raise - 2 x daily - 5 x weekly - 1-3 sets - 10-15 reps - 1-2 hold  Static Prone on Elbows - 2 x daily - 5 x weekly - 1-3 sets - 10-15 reps - 1-2 hold  Prone Press Up - 2 x daily - 5 x weekly - 1-3 sets - 10-15 reps - 1-2 hold    Treatment/Session Summary:           · Response to Treatment:  Patient tolerated aquatic therapy well today. Patient demonstrated good effort with all exercises in the pool. Instructed patient to continue home exercises as directed.   ·   .  · Communication/Consultation:  None today  · Equipment provided today:  None today  · Recommendations/Intent for next treatment session: Next visit will focus on improving strength and function L leg, decreasing sensory disturbance, and promoting return to previous level of mobility. Total Treatment Billable Duration:  45  minutes     AE 3      Effective Dates: 1/5/2022 TO 4/5/2022 (90 days).     PT Patient Time In/Time Out  Time In: 1015  Time Out: [address] [place], [place]    Future Appointments   Date Time Provider Boris Valencia   1/26/2022 10:15 AM Su Ivan, PT [facility]   2/1/2022 10:15 AM Su Ivan, PT [facility]   2/3/2022  9:30 AM Su Ivan, PT [facility]   2/7/2022 10:15 AM [facility]   2/9/2022 10:15 AM Su Ivan, PT [facility]14[facility]2022 [facility]   2/16/2022  9:20 AM Claudia Jones MD [facility]   2/16/2022  1:00 PM [facility]   2/21/2022 10:15 AM [facility]   2/23/2022 10:15 AM [facility]   2/28/2022 10:15 AM [facility]   3/2/2022  9:50 AM [facility] OUTREACH INSURANCE [facility]   3/2/2022 10:15 AM Rafi Cabrera MD [facility]   5/4/2022  9:00 AM DO MOIRA EspinozaNE BSNE

## 2022-01-26 ENCOUNTER — HOSPITAL ENCOUNTER (OUTPATIENT)
Dept: PHYSICAL THERAPY | Age: 42
Discharge: HOME | End: 2022-01-26
Payer: COMMERCIAL

## 2022-01-26 PROCEDURE — 97140 MANUAL THERAPY 1/> REGIONS: CPT

## 2022-01-26 PROCEDURE — 97110 THERAPEUTIC EXERCISES: CPT

## 2022-01-26 NOTE — PROGRESS NOTES
Gerry Villarreal  : 1980  Primary: [address] [facility]  Secondary:  [address] at [facility]  [address], [facility], [address]  [phone]   [phone]        OUTPATIENT PHYSICAL THERAPY: Daily Treatment Note 2022  Visit Count:  5    ICD-10: Treatment Diagnosis: M79.605  Pain in L leg    TREATMENT PLAN:  Effective Dates: 2022 TO 2022 (90 days). Pre-treatment Symptoms/Complaints:  Patient reports that the aquatic therapy last time was good. She was tired afterward but it did not seem to decrease her pain levels. She did say that her numbness and discomfort in the legs, shane the L one, does not happen as often or last as long as it did prior to therapy. Pain: Initial: Pain Intensity 1: 4 10 Post Session:  310   Medications Last Reviewed:  2022  Updated Objective Findings:    TREATMENT:     Therapeutic Exercise   ( 30 min): To decrease pain, improve flexibility and motion, and increase strength. Will provide verbal and manual cues as needed to ensure proper performance of the exercises. Will increase range of motion, resistance and intensity as pt tolerates. Date:   Date:     Date:   Date:     Date: Date:   Activity/Exercise Parameters Parameters Parameters      Nustep/Scifit  6 min, level 1 10 min level 1 6 min     balance    3 x 12     Repeated flexion in standing 1 x 10.   No pain   ~ 10 min: FT, turns, EC, airex, step ups     Repeated lateral glides 1 x 10, bilat        lower trunk rotation 2 x 10 2 x 10 2 x 10      bridging  2x10 2 x 10      Supine marching  2 x 1 min 2 x 1 min       SLR 3 x 5, Bilat, w/ rests 4x5 bilat 4x5 bilat 1X 10 with L     Prone knee flexion 1 x 10, alternating 1 x 10 1 x 10      Prone press ups 2 x 10 2 x 10 2 x 10      Standing lumbar extn  1x10, ball on wall       Repeated sit to stand   2 x 10       UE extension  Blue, 1 x 10       PPT   X 3 with 10 sec holds Manual therapy  (15 min):   Patella mobs, patellar tendon mobs, lateral and distal quad massage, joint distraction in sitting. Taped pt's L knee for patella lift and support. She reported improved ease of movement and less pain after taping. Aquatic Therapy (   minutes): Aquatic treatment performed per flow grid for Decreased activity endurance, Decompression, Ease of movement, Low impact and reduced weight bearing activity and increase pain level. .  Cues provided for proper posture and correct body mechaincis. Patient has difficulty with prolonged standing and walking. Aquatic Exercise Log       Date  1/24 Date   Date   Date   Date     Activity/ Exercise Parameters Parameters Parameters Parameters Parameters   Walking forward 5 min       Walking backward        Walking sideways 3 min         Marching 5 min          Goose Step          Tip toes          Heels          Lunges        Side step squats        LE Exercises          Hip Flex/Ext x30         Hip Abd/Add X 30         SLR X 30         Calf raises X 30         Knee Flex          Squats          Leg Circles          Step Ups X 20       UE Exercises          Squeeze In X 30         Push Down          Pull Down          Bicep/Tricep        Rows/Press outs         Chi Positions          Trunk Rotation        Deep H2O/ Noodles          Stabilization          Arms only          Legs only        Cristobal Maguire walk        Lower abdominal   work           Cardio          Jogging        Lap   Swimming          Stretches          Hamstrings          Heelcords          Piriformis          Neck              [employer] Portal      Access Code: [email or web address]  URL: [email or web address]  Date: 01/05/2022  Prepared by: Pipe Porras    Exercises  Supine Bridge - 2 x daily - 5 x weekly - 1-3 sets - 10-15 reps - 1-2 hold  Supine Lower Trunk Rotation - 2 x daily - 5 x weekly - 1-3 sets - 10-15 reps - 1-2 hold  Straight Leg Raise - 2 x daily - 5 x weekly - 1-3 sets - 10-15 reps - 1-2 hold  Static Prone on Elbows - 2 x daily - 5 x weekly - 1-3 sets - 10-15 reps - 1-2 hold  Prone Press Up - 2 x daily - 5 x weekly - 1-3 sets - 10-15 reps - 1-2 hold    Treatment/Session Summary:           · Response to Treatment:  Pt reported that her knee L knee felt noticeably better after being taped. She seemed to appreciate all the balance work today. Suggested that she work on some of these challenges, safely, at home. ·    · Communication/Consultation:  None today  · Equipment provided today:  None today  · Recommendations/Intent for next treatment session: Next visit will focus on improving strength and function L leg, decreasing sensory disturbance, and promoting return to previous level of mobility. Total Treatment Billable Duration:  45  minutes          Effective Dates: 1/5/2022 TO 4/5/2022 (90 days).     PT Patient Time In/Time Out  Time In: 1015  Time Out: [address] [facility], PT,     Future Appointments   Date Time Provider Boris Valencia   2/1/2022 10:15 AM Haroon Osman, PT [facility]   2/3/2022  9:30 AM Haroon Osman, PT [facility]   2/7/2022 10:15 AM Louis Kerr [facility]   2/9/2022 10:15 AM Haroon Osman, PT [facility]   2/14/2022 10:15 AM Louis Kerr [facility]   2/16/2022  9:20 AM Antonella Herrera MD [facility]   2/16/2022  1:00 PM Haroon Nunez, PT [facility]   2/21/2022 10:15 AM Louis Kerr [facility]   2/23/2022 10:15 AM Haroon Osman, PT [facility]   2/28/2022 10:15 AM Louis Cleaves [facility]   3/2/2022  9:50 AM [facility] OUTREACH INSURANCE [facility] [address] [facility]   3/2/2022 10:15 AM Troy Nuñez MD [facility]   5/4/2022  9:00 AM DO CESAR BrooksNE

## 2022-01-26 NOTE — PROGRESS NOTES
Serena Jere  : 1980  Primary: [address] [facility]  Secondary:  [address] at [facility]  [address], [facility], [address]  [phone]   [phone]        OUTPATIENT PHYSICAL THERAPY: Daily Treatment Note 2022  Visit Count:  5    ICD-10: Treatment Diagnosis: M79.605  Pain in L leg    TREATMENT PLAN:  Effective Dates: 2022 TO 2022 (90 days). Pre-treatment Symptoms/Complaints:  Patient reports that the aquatic therapy last time was good. She was tired afterward but it did not seem to decrease her pain levels. Pain: Initial:   /10 Post Session:  4/10   Medications Last Reviewed:  2022  Updated Objective Findings:    TREATMENT:     Therapeutic Exercise   ( 45 min): To decrease pain, improve flexibility and motion, and increase strength. Will provide verbal and manual cues as needed to ensure proper performance of the exercises. Will increase range of motion, resistance and intensity as pt tolerates. Date:   Date:     Date:   Date:     Date: Date:   Activity/Exercise Parameters Parameters Parameters      Nustep/Scifit  6 min, level 1 10 min level 1 6 min     balance    ~ 10 min:  FT, EO and EC, turns, airex, a variety of small dynamic motions     Repeated flexion in standing 1 x 10. No pain   3 x 12     Repeated lateral glides 1 x 10, bilat        lower trunk rotation 2 x 10 2 x 10 2 x 10      bridging  2x10 2 x 10      Supine marching  2 x 1 min 2 x 1 min       SLR 3 x 5, Bilat, w/ rests 4x5 bilat 4x5 bilat      Prone knee flexion 1 x 10, alternating 1 x 10 1 x 10      Prone press ups 2 x 10 2 x 10 2 x 10      Standing lumbar extn  1x10, ball on wall       Repeated sit to stand   2 x 10       UE extension  Blue, 1 x 10  Blue, 2 x 10   3 positions     PPT   X 3 with 10 sec holds               Aquatic Therapy (   minutes):  Aquatic treatment performed per flow grid for Decreased activity endurance, Decompression, Ease of movement, Low impact and reduced weight bearing activity and increase pain level. .  Cues provided for proper posture and correct body mechaincis. Patient has difficulty with prolonged standing and walking. Aquatic Exercise Log       Date  1/24 Date   Date   Date   Date     Activity/ Exercise Parameters Parameters Parameters Parameters Parameters   Walking forward 5 min       Walking backward        Walking sideways 3 min         Marching 5 min          Goose Step          Tip toes          Heels          Lunges        Side step squats        LE Exercises          Hip Flex/Ext x30         Hip Abd/Add X 30         SLR X 30         Calf raises X 30         Knee Flex          Squats          Leg Circles          Step Ups X 20       UE Exercises          Squeeze In X 30         Push Down          Pull Down          Bicep/Tricep        Rows/Press outs         Chi Positions          Trunk Rotation        Deep H2O/ Noodles          Stabilization          Arms only          Legs only        Jain Maguire walk        Lower abdominal   work           Cardio          Jogging        Lap   Swimming          Stretches          Hamstrings          Heelcords          Piriformis          Neck              Jobzella Portal      Access Code: [email or web address]  URL: [email or web address]  Date: 01/05/2022  Prepared by: Hailee Mejia    Exercises  Supine Bridge - 2 x daily - 5 x weekly - 1-3 sets - 10-15 reps - 1-2 hold  Supine Lower Trunk Rotation - 2 x daily - 5 x weekly - 1-3 sets - 10-15 reps - 1-2 hold  Straight Leg Raise - 2 x daily - 5 x weekly - 1-3 sets - 10-15 reps - 1-2 hold  Static Prone on Elbows - 2 x daily - 5 x weekly - 1-3 sets - 10-15 reps - 1-2 hold  Prone Press Up - 2 x daily - 5 x weekly - 1-3 sets - 10-15 reps - 1-2 hold    Treatment/Session Summary:           · Response to Treatment:  Pt did well with exercises today.   She acknowledges that her balance is not sharp, and she appreciated some some of the challenges. Seems willing to try to work through the fibro pain. Instructed patient to continue home exercises as directed. ·   .  · Communication/Consultation:  None today  · Equipment provided today:  None today  · Recommendations/Intent for next treatment session: Next visit will focus on improving strength and function L leg, decreasing sensory disturbance, and promoting return to previous level of mobility. Total Treatment Billable Duration:  45  minutes          Effective Dates: 1/5/2022 TO 4/5/2022 (90 days).     PT Patient Time In/Time Out  Time In: 1015  Time Out: [facility], PT,     Future Appointments   Date Time Provider Boris Valencia   2/1/2022 10:15 AM Ada QuantOnofrenton Hawley, PT [facility]   2/3/2022  9:30 AM Ada Quant Clenton Corby, PT [facility]   2/7/2022 10:15 AM T[place] [facility]   2/9/2022 10:15 AM Ada QuantOnofrenton Corby, PT [facility]   2/14/2022 10:15 AM Tara [place] [facility]   2/16/2022  9:20 AM Latisha Mcleod MD [facility]   2/16/2022  1:00 PM Luciano Nunez, PT [facility]   2/21/2022 10:15 AM Tara [place] [facility]   2/23/2022 10:15 AM Ada QuantLuciano, PT [facility]   2/28/2022 10:15 AM Tara [place] [facility]   3/2/2022  9:50 AM [facility] [employer] [facility] [facility]   3/2/2022 10:15 AM Narinder Mcgowan MD [facility]   5/4/2022  9:00 AM DO MOIRA RomanNE [facility]

## 2022-02-01 ENCOUNTER — APPOINTMENT (OUTPATIENT)
Dept: PHYSICAL THERAPY | Age: 42
End: 2022-02-01
Payer: COMMERCIAL

## 2022-02-03 ENCOUNTER — HOSPITAL ENCOUNTER (OUTPATIENT)
Dept: PHYSICAL THERAPY | Age: 42
Discharge: HOME | End: 2022-02-03
Payer: COMMERCIAL

## 2022-02-03 PROCEDURE — 97110 THERAPEUTIC EXERCISES: CPT

## 2022-02-03 NOTE — PROGRESS NOTES
Nacho Alvarez  : 1980  Primary: [address] [facility]  Secondary:  [address] at [facility]  [address], [facility], [address]  [phone]   [phone]        OUTPATIENT PHYSICAL THERAPY: Daily Treatment Note 2/3/2022  Visit Count:  6    ICD-10: Treatment Diagnosis: M79.605  Pain in L leg    TREATMENT PLAN:  Effective Dates: 2022 TO 2022 (90 days). Pre-treatment Symptoms/Complaints:  Patient reports that her overall pain level has been a bit worse, from the fibro, and this makes her knee pain overall more bothersome. The tape on her knee from last visit helped for 2-3 days. Pain: Initial: Pain Intensity 1: 4 /10 Post Session:  3/10   Medications Last Reviewed:  2/3/2022  Updated Objective Findings:    TREATMENT:     Therapeutic Exercise   ( 45 min): To decrease pain, improve flexibility and motion, and increase strength. Will provide verbal and manual cues as needed to ensure proper performance of the exercises. Will increase range of motion, resistance and intensity as pt tolerates. Date:   Date:     Date:   Date:     Date:  2-3 Date:   Activity/Exercise Parameters Parameters Parameters      Nustep/Scifit  6 min, level 1 10 min level 1 6 min 10 min. Level 1    balance    3 x 12     Repeated flexion in standing 1 x 10.   No pain   ~ 10 min: FT, turns, EC, airex, step ups     Repeated lateral glides 1 x 10, bilat        lower trunk rotation 2 x 10 2 x 10 2 x 10      bridging  2x10 2 x 10      Supine marching  2 x 1 min 2 x 1 min       SLR 3 x 5, Bilat, w/ rests 4x5 bilat 4x5 bilat 1X 10 with L     Prone knee flexion 1 x 10, alternating 1 x 10 1 x 10      Prone press ups 2 x 10 2 x 10 2 x 10      Standing lumbar extn  1x10, ball on wall       Repeated sit to stand   2 x 10       UE extension  Blue, 1 x 10       PPT   X 3 with 10 sec holds      LAQs       2 x 12    Hip abd     Standing, 2 x 12    Hip flexion     Standing,2 x 12    Hip extn     Standing, leaning on plinth-2 x 12      Manual therapy  (min):   Patella mobs, patellar tendon mobs, lateral and distal quad massage, joint distraction in sitting. Aquatic Therapy (   minutes): Aquatic treatment performed per flow grid for Decreased activity endurance, Decompression, Ease of movement, Low impact and reduced weight bearing activity and increase pain level. .  Cues provided for proper posture and correct body mechaincis. Patient has difficulty with prolonged standing and walking. Aquatic Exercise Log       Date  1/24 Date   Date   Date   Date     Activity/ Exercise Parameters Parameters Parameters Parameters Parameters   Walking forward 5 min       Walking backward        Walking sideways 3 min         Marching 5 min          Goose Step          Tip toes          Heels          Lunges        Side step squats        LE Exercises          Hip Flex/Ext x30         Hip Abd/Add X 30         SLR X 30         Calf raises X 30         Knee Flex          Squats          Leg Circles          Step Ups X 20       UE Exercises          Squeeze In X 30         Push Down          Pull Down          Bicep/Tricep        Rows/Press outs         Chi Positions          Trunk Rotation        Deep H2O/ Noodles          Stabilization          Arms only          Legs only        Jain Maguire walk        Lower abdominal   work           Cardio          Jogging        Lap   Swimming          Stretches          Hamstrings          Heelcords          Piriformis          Neck              [employer] Portal      Access Code: [email or web address]  URL: [email or web address]  Date: 01/05/2022  Prepared by: Edward Chen    Exercises  Supine Bridge - 2 x daily - 5 x weekly - 1-3 sets - 10-15 reps - 1-2 hold  Supine Lower Trunk Rotation - 2 x daily - 5 x weekly - 1-3 sets - 10-15 reps - 1-2 hold  Straight Leg Raise - 2 x daily - 5 x weekly - 1-3 sets - 10-15 reps - 1-2 hold  Static Prone on Elbows - 2 x daily - 5 x weekly - 1-3 sets - 10-15 reps - 1-2 hold  Prone Press Up - 2 x daily - 5 x weekly - 1-3 sets - 10-15 reps - 1-2 hold    Treatment/Session Summary:           · Response to Treatment:  Pt reported that she still thinks therapy is helping her to feel some better. The tingling in the L leg is less frequent and less intense, shorter duration. Suggested she try to work some of the exercises we did today into her usual routine at home, and continue to lie prone daily. ·    · Communication/Consultation:  None today  · Equipment provided today:  None today  · Recommendations/Intent for next treatment session: Next visit will focus on improving strength and function L leg, decreasing sensory disturbance, and promoting return to previous level of mobility. Total Treatment Billable Duration:  45  minutes          Effective Dates: 1/5/2022 TO 4/5/2022 (90 days).     PT Patient Time In/Time Out  Time In: 0930  Time Out: [address] [facility], PT,     Future Appointments   Date Time Provider [facility]   2/7/2022 10:15 AM [facility]   2/9/2022 10:15 AM Kimmie Turner, PT [facility]   2/14/2022 10:15 AM [facility]   2/16/2022  9:20 AM Skip Taylor MD [facility]   2/16/2022  1:00 PM Kimmie Nunez, PT [facility]   2/21/2022 10:15 AM Lou Bergo [facility]   2/23/2022 10:15 AM Kimmie Turner, PT [facility]   2/28/2022 10:15 AM Lou Bergo [facility]   3/2/2022  9:50 AM [facility] [employer] [facility] [facility]   3/2/2022 10:15 AM Betzaida Granados MD [facility]   5/4/2022  9:00 AM DO MOIRA GoldmanNE [facility]

## 2022-02-07 ENCOUNTER — HOSPITAL ENCOUNTER (OUTPATIENT)
Dept: PHYSICAL THERAPY | Age: 42
Discharge: HOME | End: 2022-02-07
Payer: COMMERCIAL

## 2022-02-07 PROCEDURE — 97113 AQUATIC THERAPY/EXERCISES: CPT

## 2022-02-07 NOTE — PROGRESS NOTES
Aga House  : 1980  Primary: [address] [facility]  Secondary:  Therapy Center at [facility]  [address], [facility], [address]  [phone]   [phone]        OUTPATIENT PHYSICAL THERAPY: Daily Treatment Note 2022  Visit Count:  7    ICD-10: Treatment Diagnosis: M79.605  Pain in L leg    TREATMENT PLAN:  Effective Dates: 2022 TO 2022 (90 days). Pre-treatment Symptoms/Complaints:  Patient reports knees are hurting more today. Pain: Initial: Pain Intensity 1: 6 /10 Post Session:  3/10   Medications Last Reviewed:  2022  Updated Objective Findings:    TREATMENT:     Therapeutic Exercise   (  min): To decrease pain, improve flexibility and motion, and increase strength. Will provide verbal and manual cues as needed to ensure proper performance of the exercises. Will increase range of motion, resistance and intensity as pt tolerates. Date:   Date:     Date:  2-3 Date:   Activity/Exercise Parameters      Nustep/Scifit 10 min level 1 6 min 10 min. Level 1    balance  3 x 12     Repeated flexion in standing  ~ 10 min: FT, turns, EC, airex, step ups     Repeated lateral glides       lower trunk rotation 2 x 10      bridging 2 x 10      Supine marching 2 x 1 min       SLR 4x5 bilat 1X 10 with L     Prone knee flexion 1 x 10      Prone press ups 2 x 10      Standing lumbar extn       Repeated sit to stand        UE extension       PPT X 3 with 10 sec holds      LAQs     2 x 12    Hip abd   Standing, 2 x 12    Hip flexion   Standing,2 x 12    Hip extn   Standing, leaning on plinth-2 x 12      Manual therapy  (min):   Patella mobs, patellar tendon mobs, lateral and distal quad massage, joint distraction in sitting. Aquatic Therapy ( 45  minutes):  Aquatic treatment performed per flow grid for Decreased activity endurance, Decompression, Ease of movement, Low impact and reduced weight bearing activity and increase pain level. .  Cues provided for proper posture and correct body mechaincis. Patient has difficulty with prolonged standing and walking. Aquatic Exercise Log       Date  1/24 Date  2/7 Date   Date   Date     Activity/ Exercise Parameters Parameters Parameters Parameters Parameters   Walking forward 5 min 5 min      Walking backward        Walking sideways 3 min 3 min        Marching 5 min  5 min        Goose Step          Tip toes          Heels          Lunges        Side step squats        LE Exercises          Hip Flex/Ext x30 X 1 min B        Hip Abd/Add X 30 X 1 min B        SLR X 30 X 1 min B        Calf raises X 30         Knee Flex          Squats          Leg Circles          Step Ups X 20 X 20      UE Exercises          Squeeze In X 30 X 1 min        Push Down          Pull Down          Bicep/Tricep        Rows/Press outs         Chi Positions          Trunk Rotation        Deep H2O/ Noodles          Stabilization          Arms only          Legs only        Jain Maguire walk        Lower abdominal   work           Cardio          Jogging        Lap   Swimming          Stretches          Hamstrings          Heelcords          Piriformis          Neck              [employer] Portal      Access Code: [email or web address]  URL: [email or web address]  Date: 01/05/2022  Prepared by: Lawrance Fire    Exercises  Supine Bridge - 2 x daily - 5 x weekly - 1-3 sets - 10-15 reps - 1-2 hold  Supine Lower Trunk Rotation - 2 x daily - 5 x weekly - 1-3 sets - 10-15 reps - 1-2 hold  Straight Leg Raise - 2 x daily - 5 x weekly - 1-3 sets - 10-15 reps - 1-2 hold  Static Prone on Elbows - 2 x daily - 5 x weekly - 1-3 sets - 10-15 reps - 1-2 hold  Prone Press Up - 2 x daily - 5 x weekly - 1-3 sets - 10-15 reps - 1-2 hold    Treatment/Session Summary:           · Response to Treatment:  Patient tolerated treatment well. Good effort with all aquatic exercises today.  Patient is scheduled to see the doctor next week about the next step. ·    · Communication/Consultation:  None today  · Equipment provided today:  None today  · Recommendations/Intent for next treatment session: Next visit will focus on improving strength and function L leg, decreasing sensory disturbance, and promoting return to previous level of mobility. Total Treatment Billable Duration:  45  minutes          Effective Dates: 1/5/2022 TO 4/5/2022 (90 days).     PT Patient Time In/Time Out  Time In: 1015  Time Out: [address] [place], [place]    Future Appointments   Date Time Provider Boris Valencia   2/16/2022  9:20 AM Sondra Sin MD [facility]   2/16/2022  1:00 PM [facility]unez, [facility]   2/17/2022  9:30 AM Vinicio Hughes, PT [facility]   2/21/2022 10:15 AM [facility]   2/23/2022 10:15 AM Vinicio Hughes, PT [facility]   2/28/2022 10:15 AM Isauro Almaguer [facility]   3/2/2022  9:50 AM [facility] [employer] [facility]   3/2/2022 10:15 AM Augustin Brown MD [facility]   5/4/2022  9:00 AM DO MOIRA GonsalezNE BS[facility]

## 2022-02-09 ENCOUNTER — APPOINTMENT (OUTPATIENT)
Dept: PHYSICAL THERAPY | Age: 42
End: 2022-02-09
Payer: COMMERCIAL

## 2022-02-14 ENCOUNTER — APPOINTMENT (OUTPATIENT)
Dept: PHYSICAL THERAPY | Age: 42
End: 2022-02-14
Payer: COMMERCIAL

## 2022-02-16 ENCOUNTER — HOSPITAL ENCOUNTER (OUTPATIENT)
Dept: PHYSICAL THERAPY | Age: 42
Discharge: HOME | End: 2022-02-16
Payer: COMMERCIAL

## 2022-02-17 ENCOUNTER — APPOINTMENT (OUTPATIENT)
Dept: PHYSICAL THERAPY | Age: 42
End: 2022-02-17
Payer: COMMERCIAL

## 2022-02-17 NOTE — PROGRESS NOTES
Yoel Bullock  : 1980  Primary: [address] [facility]  Secondary:  Therapy Center at [facility] Therapy  [address], [facility], [address]  [phone]   [phone]        OUTPATIENT DAILY NOTE    NAME/AGE/GENDER: Yoel Bullock is a 43 y.o. female. DATE: 2022    Ms. Domenico Rein for today's appointment due to lack of progress. Her pain is not changing at all, maybe even a little worse. After discussion with her dr, she decided to stop  PT at this time. cancelled appt today and all of her scheduled appts.   Mani Montejo, PT

## 2022-02-21 ENCOUNTER — APPOINTMENT (OUTPATIENT)
Dept: PHYSICAL THERAPY | Age: 42
End: 2022-02-21
Payer: COMMERCIAL

## 2022-02-23 ENCOUNTER — APPOINTMENT (OUTPATIENT)
Dept: PHYSICAL THERAPY | Age: 42
End: 2022-02-23
Payer: COMMERCIAL

## 2022-02-28 ENCOUNTER — APPOINTMENT (OUTPATIENT)
Dept: PHYSICAL THERAPY | Age: 42
End: 2022-02-28
Payer: COMMERCIAL

## 2022-03-02 ENCOUNTER — HOSPITAL ENCOUNTER (OUTPATIENT)
Dept: LAB | Age: 42
Discharge: HOME | End: 2022-03-02
Payer: COMMERCIAL

## 2022-03-02 DIAGNOSIS — D50.9 IRON DEFICIENCY ANEMIA, UNSPECIFIED IRON DEFICIENCY ANEMIA TYPE: ICD-10-CM

## 2022-03-02 LAB
ALBUMIN SERPL-MCNC: 3.5 G/DL (ref 3.5–5)
ALBUMIN/GLOB SERPL: 0.8 {RATIO} (ref 1.2–3.5)
ALP SERPL-CCNC: 76 U/L (ref 50–136)
ALT SERPL-CCNC: 14 U/L (ref 12–65)
ANION GAP SERPL CALC-SCNC: 3 MMOL/L (ref 7–16)
AST SERPL-CCNC: 11 U/L (ref 15–37)
BASOPHILS # BLD: 0 K/UL (ref 0–0.2)
BASOPHILS NFR BLD: 0 % (ref 0–2)
BILIRUB SERPL-MCNC: 0.4 MG/DL (ref 0.2–1.1)
BUN SERPL-MCNC: 10 MG/DL (ref 6–23)
CALCIUM SERPL-MCNC: 8.8 MG/DL (ref 8.3–10.4)
CHLORIDE SERPL-SCNC: 108 MMOL/L (ref 98–107)
CO2 SERPL-SCNC: 26 MMOL/L (ref 21–32)
CREAT SERPL-MCNC: 0.8 MG/DL (ref 0.6–1)
DIFFERENTIAL METHOD BLD: ABNORMAL
EOSINOPHIL # BLD: 0.1 K/UL (ref 0–0.8)
EOSINOPHIL NFR BLD: 1 % (ref 0.5–7.8)
ERYTHROCYTE [DISTWIDTH] IN BLOOD BY AUTOMATED COUNT: 15 % (ref 11.9–14.6)
FERRITIN SERPL-MCNC: 7 NG/ML (ref 8–388)
GLOBULIN SER CALC-MCNC: 4.5 G/DL (ref 2.3–3.5)
GLUCOSE SERPL-MCNC: 91 MG/DL (ref 65–100)
HCT VFR BLD AUTO: 34 % (ref 35.8–46.3)
HGB BLD-MCNC: 10.6 G/DL (ref 11.7–15.4)
HGB RETIC QN AUTO: 24 PG (ref 29–35)
IMM GRANULOCYTES # BLD AUTO: 0 K/UL (ref 0–0.5)
IMM GRANULOCYTES NFR BLD AUTO: 0 % (ref 0–5)
IMM RETICS NFR: 16.4 % (ref 3–15.9)
IRON SATN MFR SERPL: 11 %
IRON SERPL-MCNC: 45 UG/DL (ref 35–150)
LYMPHOCYTES # BLD: 1.5 K/UL (ref 0.5–4.6)
LYMPHOCYTES NFR BLD: 22 % (ref 13–44)
MCH RBC QN AUTO: 26.4 PG (ref 26.1–32.9)
MCHC RBC AUTO-ENTMCNC: 31.2 G/DL (ref 31.4–35)
MCV RBC AUTO: 84.6 FL (ref 79.6–97.8)
MONOCYTES # BLD: 0.5 K/UL (ref 0.1–1.3)
MONOCYTES NFR BLD: 7 % (ref 4–12)
NEUTS SEG # BLD: 4.8 K/UL (ref 1.7–8.2)
NEUTS SEG NFR BLD: 70 % (ref 43–78)
NRBC # BLD: 0 K/UL (ref 0–0.2)
PLATELET # BLD AUTO: 291 K/UL (ref 150–450)
PMV BLD AUTO: 10.6 FL (ref 9.4–12.3)
POTASSIUM SERPL-SCNC: 4.2 MMOL/L (ref 3.5–5.1)
PROT SERPL-MCNC: 8 G/DL (ref 6.3–8.2)
RBC # BLD AUTO: 4.02 M/UL (ref 4.05–5.2)
RETICS # AUTO: 0.06 M/UL (ref 0.03–0.1)
RETICS/RBC NFR AUTO: 1.4 % (ref 0.3–2)
SODIUM SERPL-SCNC: 137 MMOL/L (ref 136–145)
TIBC SERPL-MCNC: 406 UG/DL (ref 250–450)
WBC # BLD AUTO: 7 K/UL (ref 4.3–11.1)

## 2022-03-02 PROCEDURE — 82728 ASSAY OF FERRITIN: CPT

## 2022-03-02 PROCEDURE — 83540 ASSAY OF IRON: CPT

## 2022-03-02 PROCEDURE — 36415 COLL VENOUS BLD VENIPUNCTURE: CPT

## 2022-03-02 PROCEDURE — 85046 RETICYTE/HGB CONCENTRATE: CPT

## 2022-03-02 PROCEDURE — 80053 COMPREHEN METABOLIC PANEL: CPT

## 2022-03-02 PROCEDURE — 85025 COMPLETE CBC W/AUTO DIFF WBC: CPT

## 2022-03-16 ENCOUNTER — HOSPITAL ENCOUNTER (OUTPATIENT)
Dept: INFUSION THERAPY | Age: 42
Discharge: HOME | End: 2022-03-16
Payer: COMMERCIAL

## 2022-03-16 VITALS
TEMPERATURE: 98 F | RESPIRATION RATE: 18 BRPM | SYSTOLIC BLOOD PRESSURE: 132 MMHG | HEART RATE: 67 BPM | DIASTOLIC BLOOD PRESSURE: 94 MMHG | OXYGEN SATURATION: 96 %

## 2022-03-16 DIAGNOSIS — R53.83 FATIGUE, UNSPECIFIED TYPE: ICD-10-CM

## 2022-03-16 DIAGNOSIS — Z98.84 H/O GASTRIC BYPASS: Primary | ICD-10-CM

## 2022-03-16 DIAGNOSIS — E53.8 LOW SERUM VITAMIN B12: ICD-10-CM

## 2022-03-16 DIAGNOSIS — D50.9 IRON DEFICIENCY ANEMIA, UNSPECIFIED IRON DEFICIENCY ANEMIA TYPE: ICD-10-CM

## 2022-03-16 PROCEDURE — 74011250636 HC RX REV CODE- 250/636: Performed by: INTERNAL MEDICINE

## 2022-03-16 PROCEDURE — 96365 THER/PROPH/DIAG IV INF INIT: CPT

## 2022-03-16 PROCEDURE — 96361 HYDRATE IV INFUSION ADD-ON: CPT

## 2022-03-16 PROCEDURE — 96372 THER/PROPH/DIAG INJ SC/IM: CPT

## 2022-03-16 RX ORDER — SODIUM CHLORIDE 0.9 % (FLUSH) 0.9 %
10 SYRINGE (ML) INJECTION AS NEEDED
Status: ACTIVE | OUTPATIENT
Start: 2022-03-16 | End: 2022-03-16

## 2022-03-16 RX ORDER — CYANOCOBALAMIN 1000 UG/ML
1000 INJECTION, SOLUTION INTRAMUSCULAR; SUBCUTANEOUS ONCE
Status: COMPLETED | OUTPATIENT
Start: 2022-03-16 | End: 2022-03-16

## 2022-03-16 RX ADMIN — SODIUM CHLORIDE 500 ML: 9 INJECTION, SOLUTION INTRAVENOUS at 11:55

## 2022-03-16 RX ADMIN — Medication 10 ML: at 10:55

## 2022-03-16 RX ADMIN — CYANOCOBALAMIN 1000 MCG: 1000 INJECTION, SOLUTION INTRAMUSCULAR; SUBCUTANEOUS at 11:36

## 2022-03-16 RX ADMIN — FERRIC CARBOXYMALTOSE INJECTION 750 MG: 50 INJECTION, SOLUTION INTRAVENOUS at 11:35

## 2022-03-16 RX ADMIN — Medication 10 ML: at 12:26

## 2022-03-16 NOTE — PROGRESS NOTES
Arrived to the [facility]. Injectafer and B12 injection completed. Education provided on SQ B12 administration. Patient tolerated well. Any issues or concerns during appointment: no.  Patient aware of next infusion appointment on 3/23/2022 (date) at 6 am (time). Patient instructed to call provider with temperature of 100.4 or greater or nausea/vomiting/ diarrhea or pain not controlled by medications  Discharged ambulatory with self.

## 2022-03-18 PROBLEM — R53.83 FATIGUE: Status: ACTIVE | Noted: 2017-08-25

## 2022-03-18 PROBLEM — Z98.84 H/O GASTRIC BYPASS: Status: ACTIVE | Noted: 2018-12-06

## 2022-03-19 PROBLEM — E53.8 FOLIC ACID DEFICIENCY: Status: ACTIVE | Noted: 2018-12-06

## 2022-03-19 PROBLEM — E53.8 LOW SERUM VITAMIN B12: Status: ACTIVE | Noted: 2020-11-18

## 2022-03-23 ENCOUNTER — HOSPITAL ENCOUNTER (OUTPATIENT)
Dept: INFUSION THERAPY | Age: 42
Discharge: HOME | End: 2022-03-23
Payer: COMMERCIAL

## 2022-03-23 VITALS
TEMPERATURE: 98.4 F | OXYGEN SATURATION: 97 % | RESPIRATION RATE: 18 BRPM | SYSTOLIC BLOOD PRESSURE: 146 MMHG | DIASTOLIC BLOOD PRESSURE: 84 MMHG | HEART RATE: 90 BPM

## 2022-03-23 DIAGNOSIS — D50.9 IRON DEFICIENCY ANEMIA, UNSPECIFIED IRON DEFICIENCY ANEMIA TYPE: ICD-10-CM

## 2022-03-23 DIAGNOSIS — R53.83 FATIGUE, UNSPECIFIED TYPE: ICD-10-CM

## 2022-03-23 DIAGNOSIS — Z98.84 H/O GASTRIC BYPASS: Primary | ICD-10-CM

## 2022-03-23 PROCEDURE — 96365 THER/PROPH/DIAG IV INF INIT: CPT

## 2022-03-23 PROCEDURE — 74011250636 HC RX REV CODE- 250/636: Performed by: INTERNAL MEDICINE

## 2022-03-23 RX ORDER — SODIUM CHLORIDE 0.9 % (FLUSH) 0.9 %
10 SYRINGE (ML) INJECTION AS NEEDED
Status: DISCONTINUED | OUTPATIENT
Start: 2022-03-23 | End: 2022-03-24 | Stop reason: HOSPADM

## 2022-03-23 RX ADMIN — Medication 10 ML: at 12:11

## 2022-03-23 RX ADMIN — FERRIC CARBOXYMALTOSE INJECTION 750 MG: 50 INJECTION, SOLUTION INTRAVENOUS at 12:23

## 2022-03-23 RX ADMIN — Medication 10 ML: at 12:47

## 2022-03-23 NOTE — PROGRESS NOTES
Patient arrived ambulatory to infusion area. IV injectafer completed. Patient tolerated well. Refused 30 minute observation period.  Discharged home ambulatory

## 2022-04-24 DIAGNOSIS — D50.9 IRON DEFICIENCY ANEMIA, UNSPECIFIED IRON DEFICIENCY ANEMIA TYPE: Primary | ICD-10-CM

## 2022-05-04 NOTE — THERAPY DISCHARGE
Alicia Finch  : 1980  Primary: [facility]  Secondary:  [address] at [facility] Therapy  [address], [facility], [address]  [phone]   [phone]          OUTPATIENT PHYSICAL THERAPY:  Initial Assessment 2/3/2022   ICD-10: Treatment Diagnosis: M79.605  Pain in L leg  Precautions/Allergies:   Excedrin p.m. [diphenhydramine-acetaminophen], Excedrin [acetaminophen-caffeine], Nabumetone, and Pepto-bismol [bismuth subsalicylate]     TREATMENT PLAN:  .  Frequency/Duration:   Pt has been DC'd from PT. Kaylene Lyles MEDICAL/REFERRING DIAGNOSIS:  Pain in left leg [M79.605]   DATE OF ONSET: since 2021  REFERRING PHYSICIAN: Cynthia Hooper MD MD Orders: Eval and Treat    Return MD Appointment: TBD     INITIAL ASSESSMENT:  Ms. Candi Fowler presents with reports of intermittent tingling and : heaviness\" in the L leg, starting at lateral hip and extending down to lateral shin. It is intermittent, seemingly random, as it can happen in standing, sitting or lying. It seems worse in standing and can be relieved by sitting. She does not mention pain. She has a long history of fibromyalgia, and is familiar with those pains, and this is different. She does not take any meds for this. She seems very weak and deconditioned, and is expected to benefit from PT program with emphasis on specific core and lumbar strengthening, general strengthening in B LEs. Ex   DISCHARGE SUMMARY:  Pt attended 6 visits to PT, for treatment of LE pain, specific L knee pain, and fibromyalgia. She tried to perform gentle stretching and light LE strengthening in PT, mostly in supine on the mat. She also did some exercise in standing. While she could perform most of the exercises, but with low tolerance for sustained movement, she usually had increased pain afterward. We tried 1 visit for therapy in the pool, and there was no difference in post-exercise pain level.   Pt returned to her dr, and it was suggested that she stop PT at that time due to increased pain and lack of improvement. Pt has been Dc'd from PT.          1.  1.      GOALS: (Goals have been discussed and agreed upon with patient.)  Short-Term Functional Goals: Time Frame: 4 weeks-- goals not met, due to increasing and poorly managed pain  1. Improve L leg strength to allow 3 x 10 for supine LE ex  2. Pt able to manage 4\" step with L leg, for demonstrated improvement in strength  3. Pt to tolerate walking > 6 min, on treadmill at ~ 1.5 mph  Discharge Goals: Time Frame: 10 weeks  1. Improve pt's tolerance for walking to > 15 min at > 1.5 mph  2. Pt to tolerate single leg stand on each leg x 5 sec  3. Pt to be independent in HEP for lumbar and LE exercises      [place], PT       Referring Physician Signature: Marco Monahan MD _______________________________ Date _____________                                                                                                          Information below was gathered on Initial Assessment--  1-5-2022  PAIN/SUBJECTIVE:   Initial: Pain Intensity 1: 4  Post Session:  5/10   HISTORY:   History of Injury/Illness (Reason for Referral):  Pt reports that her L leg started with this tingling and heaviness in March, for no apparent reason. Minimal at first but has gradually grown more bothersome, to the point of pain, limiting her walking tolerance. She describes the feeling as tingling, like when the leg is asleep and then waking, and heavy. She denies any falls. Symptoms are intermittent, can happen in sitting, standing, walking and lying down. Usually in standing is the worst.  No imaging done, no meds for this new pain. She feels like her altered walking is causing her R hip joint area to hurt. She uses a st cane for long standing balance issues.     Past Medical History/Comorbidities:   Ms. Yanelis Akhtar  has a past medical history of Anemia, Fibromyalgia, GERD (gastroesophageal reflux disease) (3/25/2014), H/O seasonal allergies, Hypothyroidism (3/25/2014), Migraine (3/25/2014), Morbid obesity ([address] (3/25/2014), Oligomenorrhea (3/25/2014), Ovarian cyst (3/25/2014), PCOS (polycystic ovarian syndrome), Trichomonosis, and Vitamin D deficiency. Ms. Deniz Juan  has a past surgical history that includes hx gastrectomy (7/29/15). Social History/Living Environment:     lives with a significant other/boyfriend. Lives in 2 level apt, bedroom upstairs. Prior Level of Function/Work/Activity:  Not working . Stopped working a few years ago due to fibormyalgia  Dominant Side:         RIGHT    Personal Factors:          Age:  43 y.o. Ambulatory/Rehab Services H2 Model Falls Risk Assessment   Risk Factors:       (2)  Any administered antiepileptics/anticonvulsants Ability to Rise from Chair:       (1)  Pushes up, successful in one attempt   Falls Prevention Plan: Mobility Assistance Device (specify):  uses st cane for ambulation   Total: (5 or greater = High Risk): 3   ©2010 [facility] [address] [place] [address] States Patent #[id number]. Federal Law prohibits the replication, distribution or use without written permission from [facility]   Current Medications:       Current Outpatient Medications:     cyanocobalamin (VITAMIN B12) 1,000 mcg/mL injection, 1 mL by SubCUTAneous route every thirty (30) days. (Patient not taking: Reported on 2/23/2022), Disp: 3 mL, Rfl: 3    folic acid (FOLVITE) 1 mg tablet, Take 1 tablet by mouth once daily, Disp: 30 Tablet, Rfl: 5    pregabalin (Lyrica) 150 mg capsule, Take 1 Capsule by mouth three (3) times daily. Max Daily Amount: 450 mg. Take 1 cap PO Q 8 hours PRN pain, use sparingly, Disp: 180 Capsule, Rfl: 5    medroxyPROGESTERone (PROVERA) 10 mg tablet, Take 1 Tablet by mouth daily. (Patient taking differently: Take 10 mg by mouth daily. prn), Disp: 10 Tablet, Rfl: 0    citalopram (CELEXA) 40 mg tablet, Take 1 Tablet by mouth daily. , Disp: 90 Tablet, Rfl: 3    DISABLED PLACARD (DISABLED PLACARD) DMV, Handicap placard, Disp: 1 Each, Rfl: 0    DULoxetine (CYMBALTA) 60 mg capsule, Take 1 capsule by mouth once daily, Disp: 90 Capsule, Rfl: 3    levothyroxine (SYNTHROID) 88 mcg tablet, Take 1 Tablet by mouth Daily (before breakfast). , Disp: 90 Tablet, Rfl: 3    valACYclovir (VALTREX) 1 gram tablet, 2 tabs at onset, then 2 tabs 12 hours later, for a total of 2 doses, NEVER TAKE WITH TIZANIDINE/ZANAFLEX, Disp: 4 Tablet, Rfl: 0    ibuprofen (ADVIL) 200 mg tablet, Take  by mouth as needed for Pain., Disp: , Rfl:     LORATADINE (CLARITIN PO), Take  by mouth., Disp: , Rfl:     cholecalciferol (VITAMIN D3) 1,000 unit cap, Take 1,000 Units by mouth two (2) times a day., Disp: , Rfl:     ZOLMitriptan (ZOMIG) 5 mg tablet, Take 1 Tab by mouth as needed for Migraine. , Disp: 6 Tab, Rfl: 5    multivitamin (ONE A DAY) tablet, Take 1 tablet by mouth daily. , Disp: , Rfl:    Date Last Reviewed:  5/4/2022     Number of Personal Factors/Comorbidities that affect the Plan of Care: 3+: HIGH COMPLEXITY   EXAMINATION:   Observation:  Very gregarious, conversant obese 38 yo female. Walks with st cane with min limp on L. She said the cane is long standing, for balance deficit not the new L leg pain. ROM:          Has good spinal mobility in standing--can touch shoe tops and has good lumbar extn  . Repeated flexion does not produce any sx. Strength:          MMT in sitting: min R hip weakness ( 4),     L hip flex and knee extn quality for resistance in MMT is less than on R. Generally strength is 4+. Neurological Screen:        Neural Tension Tests:  negative        Sensation: pt reports intermittent tingling, feeling like the leg is asleep, in the area of the L4-5 dermatome. No sx today at time of assessment. Functional Mobility:         Gait/Ambulation:  Pt ambs with slightly slow pace, with st cane for balance.   Has min but noticeable lateral sway.        Stairs:  NT but she reports significant difficulty on stairs. Balance:          NT.  Pt does have some obvious balance deficit/unsteadinss, noted while performing trunk mobility activities             Body Structures Involved:  1. Nerves  2. Joints  3. Muscles  4. Ligaments Body Functions Affected:  1. Mental  2. Sensory/Pain  3. Neuromusculoskeletal  4. Movement Related Activities and Participation Affected:  1. General Tasks and Demands  2. Mobility  3. Self Care  4. Domestic Life  5.  Community, Social and [place]   Number of elements (examined above) that affect the Plan of Care: 3: MODERATE COMPLEXITY   CLINICAL PRESENTATION:   Presentation: Evolving clinical presentation with changing clinical characteristics: MODERATE COMPLEXITY   CLINICAL DECISION MAKING:   Use of outcome tool(s) and clinical judgement create a POC that gives a: Questionable prediction of patient's progress: MODERATE COMPLEXITY

## 2022-06-09 ENCOUNTER — PREP FOR PROCEDURE (OUTPATIENT)
Dept: ADMINISTRATIVE | Age: 42
End: 2022-06-09

## 2022-06-09 RX ORDER — SODIUM CHLORIDE 0.9 % (FLUSH) 0.9 %
5-40 SYRINGE (ML) INJECTION PRN
OUTPATIENT
Start: 2022-06-09

## 2022-06-09 RX ORDER — SODIUM CHLORIDE 0.9 % (FLUSH) 0.9 %
5-40 SYRINGE (ML) INJECTION EVERY 12 HOURS SCHEDULED
OUTPATIENT
Start: 2022-06-09

## 2022-06-09 RX ORDER — SODIUM CHLORIDE 9 MG/ML
INJECTION, SOLUTION INTRAVENOUS PRN
OUTPATIENT
Start: 2022-06-09

## 2022-06-29 ENCOUNTER — OFFICE VISIT (OUTPATIENT)
Dept: ONCOLOGY | Age: 42
End: 2022-06-29
Payer: COMMERCIAL

## 2022-06-29 ENCOUNTER — HOSPITAL ENCOUNTER (OUTPATIENT)
Dept: LAB | Age: 42
Discharge: HOME | End: 2022-07-02
Payer: COMMERCIAL

## 2022-06-29 VITALS
OXYGEN SATURATION: 98 % | SYSTOLIC BLOOD PRESSURE: 145 MMHG | TEMPERATURE: 99.2 F | BODY MASS INDEX: 49.58 KG/M2 | HEART RATE: 80 BPM | WEIGHT: 290.4 LBS | RESPIRATION RATE: 16 BRPM | DIASTOLIC BLOOD PRESSURE: 88 MMHG | HEIGHT: 64 IN

## 2022-06-29 DIAGNOSIS — E53.8 VITAMIN B12 DEFICIENCY: ICD-10-CM

## 2022-06-29 DIAGNOSIS — D50.9 IRON DEFICIENCY ANEMIA, UNSPECIFIED IRON DEFICIENCY ANEMIA TYPE: ICD-10-CM

## 2022-06-29 DIAGNOSIS — D50.9 IRON DEFICIENCY ANEMIA, UNSPECIFIED IRON DEFICIENCY ANEMIA TYPE: Primary | ICD-10-CM

## 2022-06-29 DIAGNOSIS — E53.8 FOLIC ACID DEFICIENCY: ICD-10-CM

## 2022-06-29 DIAGNOSIS — R53.83 FATIGUE, UNSPECIFIED TYPE: ICD-10-CM

## 2022-06-29 LAB
ALBUMIN SERPL-MCNC: 3 G/DL (ref 3.5–5)
ALBUMIN/GLOB SERPL: 0.9 {RATIO} (ref 1.2–3.5)
ALP SERPL-CCNC: 50 U/L (ref 50–136)
ALT SERPL-CCNC: 20 U/L (ref 12–65)
ANION GAP SERPL CALC-SCNC: 8 MMOL/L (ref 7–16)
AST SERPL-CCNC: 13 U/L (ref 15–37)
BASOPHILS # BLD: 0 K/UL (ref 0–0.2)
BASOPHILS NFR BLD: 0 % (ref 0–2)
BILIRUB SERPL-MCNC: 0.4 MG/DL (ref 0.2–1.1)
BUN SERPL-MCNC: 6 MG/DL (ref 6–23)
CALCIUM SERPL-MCNC: 8.8 MG/DL (ref 8.3–10.4)
CHLORIDE SERPL-SCNC: 108 MMOL/L (ref 98–107)
CO2 SERPL-SCNC: 25 MMOL/L (ref 21–32)
CREAT SERPL-MCNC: 0.7 MG/DL (ref 0.6–1)
DIFFERENTIAL METHOD BLD: ABNORMAL
EOSINOPHIL # BLD: 0 K/UL (ref 0–0.8)
EOSINOPHIL NFR BLD: 1 % (ref 0.5–7.8)
ERYTHROCYTE [DISTWIDTH] IN BLOOD BY AUTOMATED COUNT: 16 % (ref 11.9–14.6)
FERRITIN SERPL-MCNC: 105 NG/ML (ref 8–388)
GLOBULIN SER CALC-MCNC: 3.2 G/DL (ref 2.3–3.5)
GLUCOSE SERPL-MCNC: 80 MG/DL (ref 65–100)
HCT VFR BLD AUTO: 40.6 % (ref 35.8–46.3)
HGB BLD-MCNC: 13.3 G/DL (ref 11.7–15.4)
HGB RETIC QN AUTO: 36 PG (ref 29–35)
IMM GRANULOCYTES # BLD AUTO: 0 K/UL (ref 0–0.5)
IMM GRANULOCYTES NFR BLD AUTO: 0 % (ref 0–5)
IMM RETICS NFR: 7.8 % (ref 3–15.9)
IRON SATN MFR SERPL: 38 %
IRON SERPL-MCNC: 100 UG/DL (ref 35–150)
LYMPHOCYTES # BLD: 1.6 K/UL (ref 0.5–4.6)
LYMPHOCYTES NFR BLD: 22 % (ref 13–44)
MCH RBC QN AUTO: 29.6 PG (ref 26.1–32.9)
MCHC RBC AUTO-ENTMCNC: 32.8 G/DL (ref 31.4–35)
MCV RBC AUTO: 90.4 FL (ref 79.6–97.8)
MONOCYTES # BLD: 0.5 K/UL (ref 0.1–1.3)
MONOCYTES NFR BLD: 7 % (ref 4–12)
NEUTS SEG # BLD: 5 K/UL (ref 1.7–8.2)
NEUTS SEG NFR BLD: 70 % (ref 43–78)
NRBC # BLD: 0 K/UL (ref 0–0.2)
PLATELET # BLD AUTO: 214 K/UL (ref 150–450)
PMV BLD AUTO: 12.2 FL (ref 9.4–12.3)
POTASSIUM SERPL-SCNC: 3.8 MMOL/L (ref 3.5–5.1)
PROT SERPL-MCNC: 6.2 G/DL (ref 6.3–8.2)
RBC # BLD AUTO: 4.49 M/UL (ref 4.05–5.2)
RETICS # AUTO: 0.06 M/UL (ref 0.03–0.1)
RETICS/RBC NFR AUTO: 1.3 % (ref 0.3–2)
SODIUM SERPL-SCNC: 141 MMOL/L (ref 136–145)
TIBC SERPL-MCNC: 266 UG/DL (ref 250–450)
WBC # BLD AUTO: 7.1 K/UL (ref 4.3–11.1)

## 2022-06-29 PROCEDURE — 80053 COMPREHEN METABOLIC PANEL: CPT

## 2022-06-29 PROCEDURE — 99214 OFFICE O/P EST MOD 30 MIN: CPT | Performed by: NURSE PRACTITIONER

## 2022-06-29 PROCEDURE — 83540 ASSAY OF IRON: CPT

## 2022-06-29 PROCEDURE — 36415 COLL VENOUS BLD VENIPUNCTURE: CPT

## 2022-06-29 PROCEDURE — 85046 RETICYTE/HGB CONCENTRATE: CPT

## 2022-06-29 PROCEDURE — 85025 COMPLETE CBC W/AUTO DIFF WBC: CPT

## 2022-06-29 PROCEDURE — 82728 ASSAY OF FERRITIN: CPT

## 2022-06-29 RX ORDER — CYANOCOBALAMIN 1000 UG/ML
INJECTION INTRAMUSCULAR; SUBCUTANEOUS
Qty: 1 ML | Refills: 5 | Status: SHIPPED | OUTPATIENT
Start: 2022-06-29

## 2022-06-29 RX ORDER — CITALOPRAM 40 MG/1
TABLET ORAL
COMMUNITY
Start: 2022-05-15

## 2022-06-29 RX ORDER — LEVOTHYROXINE SODIUM 88 UG/1
TABLET ORAL
COMMUNITY
Start: 2022-05-24

## 2022-06-29 RX ORDER — FOLIC ACID 1 MG/1
TABLET ORAL
COMMUNITY
Start: 2022-05-15

## 2022-06-29 RX ORDER — ERGOCALCIFEROL (VITAMIN D2) 1250 MCG
50000 CAPSULE ORAL WEEKLY
COMMUNITY
End: 2022-07-29

## 2022-06-29 RX ORDER — DULOXETIN HYDROCHLORIDE 60 MG/1
CAPSULE, DELAYED RELEASE ORAL
COMMUNITY
Start: 2022-05-28

## 2022-06-29 RX ORDER — PREGABALIN 150 MG/1
CAPSULE ORAL
COMMUNITY
Start: 2022-05-20 | End: 2022-09-21 | Stop reason: SDUPTHER

## 2022-06-29 NOTE — PROGRESS NOTES
Data Source: Patient, [facility] record. 06/29/2022      Nury Moreno [id number]    43 y.o. Patient Encounter: [facility] REHABILITATION [place] Visit  Chief Complaint   Patient presents with    Follow-up     Heme Diagnosis:  BARBER  Heme History (Copied from prior):   Deangelo rodriguez 45 y. o. female h/o migraines, chronic fatigue, fibromyalgia, neuropathy, being referred for anemia.  Ms. Kay Renee presented to Dr. Will Peterson on 11/6/2018  to establish care.  Lab work completed in July by previous primary care was evaluated and revealed her hemoglobin was 9.4.  According to [address] she was unable to be reached regarding her lab work. Toyin Deal is mention of complaints of fatigue at that time.    She was diagnosed with fibromyalgia about 6 years ago, however has not seen a rheumatologist or neurologist. Has had multiple courses of PT including the fibromyalgia program. Has done water PT. Saw Dr. Harry Ruvalcaba who gave her multiple injections with no benefit. NCS apparently showed neuropathy. She is on Lyrica and Cymbalta. .   States that her periods are sometimes heavy. Has been on iron supplements in the past but they cause constipation.  Most recent hgb was 9.4.     ASSESSMENT:  Mixed nutritional deficiency with symptomatic iron deficiency and malabsorption  1) IV iron-se fully addressed  -iron stores normal, hgb stable  -repeat in 6 months     12/17/19 injectafer x 2; follow up 1 year     2) Oral folic acid 1mg and X43 1000mcg and repeat in 3mths  -non compliance, continue oral meds and rerefer to GI for scope     3) refer to GI  Will need further eval if does not respond to po as gastric sleeve explains BARBER but not folic acid and low normal b12  Further anemia work up pending-all work up negative  No improvement in fatigue, b12 low so start b12  RTC in 12 months     11/12/20  Not taking B12  Did not go to GI  States her tingling/m-s symptoms are worse  Sent here early b/o abnormal alb, globulin but normal TP and has had 3+ years of mild low alb and elevated TP  -paraprotein work up negative in 2018  hgb stable, no insurance, no sponsorship, finances are a concern  -schedule a 6month visit, pt. Wants to wait on expenses until sponsorship or insurance and then pursue paraprotein work up, likely will need IV iron at that time, product pending insurance status, given her h/o gastric sleeve mary jane. 5 years ago  -h/o low normal B12 and non compliance so don't have a great explanation for that except nutrition, but pt. Non adherent with GI work up--IF and anti-parietal negative     Transition to adult heme in 6 months  Call if issues  Pt. Agrees with plan      Interval History:  6/29/2022: She returns today for follow up of her BARBER. In March she received IV iron x 2 and reports an improvement in her fatigue, however overall remains fatigued. She has been working to lose weight, down 16#. She denies any bleeding and is scheduled for endo with GI in August.  She continues on daily folic acid and monthly B12. She has recently been seen by ortho for back and LLE pain, had steroid injection with benefit. Labs reviewed, Hgb up from 10.6 to 13.3, TSAT improved to 38% from 11% and ferritin 105 up from 7. There is no current need for repeat iron. We will recheck her labs again in 3 months. NCCN Distress Score:  PHQ-9  12/2/2021   Little interest or pleasure in doing things 1   Total Score PHQ 2 1       REVIEW OF SYSTEMS:  As mentioned above, all other systems were reviewed in full and are negative.   Past Medical History:   Diagnosis Date    Anemia     Fibromyalgia     managed with medication    GERD (gastroesophageal reflux disease) 3/25/2014    H/O seasonal allergies     Hypothyroidism 3/25/2014    managed with medication    Migraine 3/25/2014    managed with medication    Morbid obesity ([address] 3/25/2014    Oligomenorrhea 3/25/2014    Ovarian cyst 3/25/2014    PCOS (polycystic ovarian syndrome)     managed Last Year: Not on file   Transportation Needs:     Lack of Transportation (Medical): Not on file    Lack of Transportation (Non-Medical): Not on file   Physical Activity:     Days of Exercise per Week: Not on file    Minutes of Exercise per Session: Not on file   Stress:     Feeling of Stress : Not on file   Social Connections:     Frequency of Communication with Friends and Family: Not on file    Frequency of Social Gatherings with Friends and Family: Not on file    Attends Shinto Services: Not on file    Active Member of Clubs or Organizations: Not on file    Attends Club or Organization Meetings: Not on file    Marital Status: Not on file   Intimate Partner Violence:     Fear of Current or Ex-Partner: Not on file    Emotionally Abused: Not on file    Physically Abused: Not on file    Sexually Abused: Not on file   Housing Stability:     Unable to Pay for Housing in the Last Year: Not on file    Number of [place] in the Last Year: Not on file    Unstable Housing in the Last Year: Not on file     Family History   Problem Relation Age of Onset    Diabetes Father     Hypertension Father     Hypertension Mother     COPD Mother     Cancer Other         unknown aunt (melanoma)    Other Paternal Grandmother         thalassemia    Cancer Paternal Grandfather         prostate    Coronary Art Dis Other         unknown family member    Diabetes Paternal Grandmother      No Known Allergies      PHYSICAL EXAMINATION:  General Appearance: Healthy appearing patient in no acute distress  Vitals reviewed. Vitals:    06/29/22 0912   BP: (!) 145/88   Pulse: 80   Resp: 16   Temp: 99.2 °F (37.3 °C)   TempSrc: Oral   SpO2: 98%   Weight: 290 lb 6.4 oz (131.7 kg)   Height: 5' 4\" (1.626 m)   Pain Score:   8 (fatigue-7)    HEENT: Neck is supple with no thyromegaly or JVD noted. Lungs/Thorax: Clear to auscultation, no accessory muscles of respiration being used.   Heart: Regular rate and rhythm, normal S1, S2, no appreciable murmurs, rubs, gallops  Abdomen: Soft, nontender, bowel sounds present, body habitus limits exam  Extremeties: Good pulses bilaterally, no peripheral edema. Skin: Normal skin tone with no rash, petechiae, ecchymosis noted.   Musculoskeletal: No pain on palpation over bony prominence, no edema, no evidence of gout, no joint or bony deformity  Neurologic: Grossly intact    LABS/IMAGING:  Hospital Outpatient Visit on 06/29/2022   Component Date Value Ref Range Status    WBC 06/29/2022 7.1  4.3 - 11.1 K/uL Final    RBC 06/29/2022 4.49  4.05 - 5.2 M/uL Final    Hemoglobin 06/29/2022 13.3  11.7 - 15.4 g/dL Final    Hematocrit 06/29/2022 40.6  35.8 - 46.3 % Final    MCV 06/29/2022 90.4  79.6 - 97.8 FL Final    MCH 06/29/2022 29.6  26.1 - 32.9 PG Final    MCHC 06/29/2022 32.8  31.4 - 35.0 g/dL Final    RDW 06/29/2022 16.0* 11.9 - 14.6 % Final    Platelets 44/51/2533 214  150 - 450 K/uL Final    MPV 06/29/2022 12.2  9.4 - 12.3 FL Final    nRBC 06/29/2022 0.00  0.0 - 0.2 K/uL Final    **Note: Absolute NRBC parameter is now reported with Hemogram**    Differential Type 06/29/2022 AUTOMATED    Final    Seg Neutrophils 06/29/2022 70  43 - 78 % Final    Lymphocytes 06/29/2022 22  13 - 44 % Final    Monocytes 06/29/2022 7  4.0 - 12.0 % Final    Eosinophils % 06/29/2022 1  0.5 - 7.8 % Final    Basophils 06/29/2022 0  0.0 - 2.0 % Final    Immature Granulocytes 06/29/2022 0  0.0 - 5.0 % Final    Segs Absolute 06/29/2022 5.0  1.7 - 8.2 K/UL Final    Absolute Lymph # 06/29/2022 1.6  0.5 - 4.6 K/UL Final    Absolute Mono # 06/29/2022 0.5  0.1 - 1.3 K/UL Final    Absolute Eos # 06/29/2022 0.0  0.0 - 0.8 K/UL Final    Basophils Absolute 06/29/2022 0.0  0.0 - 0.2 K/UL Final    Absolute Immature Granulocyte 06/29/2022 0.0  0.0 - 0.5 K/UL Final    Sodium 06/29/2022 141  136 - 145 mmol/L Final    Potassium 06/29/2022 3.8  3.5 - 5.1 mmol/L Final    Chloride 06/29/2022 108* 98 - 107 mmol/L Final  CO2 06/29/2022 25  21 - 32 mmol/L Final    Anion Gap 06/29/2022 8  7 - 16 mmol/L Final    Glucose 06/29/2022 80  65 - 100 mg/dL Final    BUN 06/29/2022 6  6 - 23 MG/DL Final    CREATININE 06/29/2022 0.70  0.6 - 1.0 MG/DL Final    GFR  06/29/2022 >60  >60 ml/min/1.73m2 Final    GFR Non- 06/29/2022 >60  >60 ml/min/1.73m2 Final    Comment:      Estimated GFR is calculated using the Modification of Diet in Renal Disease (MDRD) Study equation, reported for both  Americans (GFRAA) and non- Americans (GFRNA), and normalized to 1.73m2 body surface area. The physician must decide which value applies to the patient. The MDRD study equation should only be used in individuals age 25 or older. It has not been validated for the following: pregnant women, patients with serious comorbid conditions,or on certain medications, or persons with extremes of body size, muscle mass, or nutritional status.  Calcium 06/29/2022 8.8  8.3 - 10.4 MG/DL Final    Total Bilirubin 06/29/2022 0.4  0.2 - 1.1 MG/DL Final    ALT 06/29/2022 20  12 - 65 U/L Final    AST 06/29/2022 13* 15 - 37 U/L Final    Alk Phosphatase 06/29/2022 50  50 - 136 U/L Final    Total Protein 06/29/2022 6.2* 6.3 - 8.2 g/dL Final    Albumin 06/29/2022 3.0* 3.5 - 5.0 g/dL Final    Globulin 06/29/2022 3.2  2.3 - 3.5 g/dL Final    Albumin/Globulin Ratio 06/29/2022 0.9* 1.2 - 3.5   Final    Ferritin 06/29/2022 105  8 - 388 NG/ML Final    Reticulocyte Count,Automated 06/29/2022 1.3  0.3 - 2.0 % Final    Absolute Retic # 06/29/2022 0.0588  0.026 - 0.095 M/ul Final    Immature Retic Fraction 06/29/2022 7.8  3.0 - 15.9 % Final    Retic Hemoglobin conc. 06/29/2022 36* 29 - 35 pg Final    Iron 06/29/2022 100  35 - 150 ug/dL Final    Comment: Known Interfering Substances section:  \"Iron values may be falsely elevated in  serum samples from patients with  anticoagulants (e.g., hemodialysis patients). \"  Limitations of Procedure section:  \"Turbidity resulting from precipitation of  fibrinogen in the serum of patients treated  with anticoagulants (e.g. hemodialysis  patients) may cause spuriously elevated  iron results. \"      TIBC 06/29/2022 266  250 - 450 ug/dL Final    TRANSFERRIN SATURATION 06/29/2022 38  >20 % Final         ICD-10-CM    1. Iron deficiency anemia, unspecified iron deficiency anemia type  D50.9    2. Fatigue, unspecified type  R53.83    3. Folic acid deficiency  E53.8    4. Vitamin B12 deficiency  E53.8        ASSESSMENT:  1. BARBER  2. B12 def  3. Folic acid def  4. Gastric sleeve 2005    6/15/2021: Seen for [facility] initial [facility] visit [facility] [address] [facility] Dr. Maggy akhtar. Reports doing well today. Denies any overt bleeding. Ongoing fatigue, as well as some numbness of her hands and feet. Recent bilateral mammogram and left sided ultrasound 4/21 with probable intramammary lymph node, and recommendation for quick interval left breast ultrasound in 6 months (10/21). She has essentially held off on most of her care while awaiting Medicaid approval.  Lab work today with out anemia however low iron stores: For now she would prefer to hold off on IV iron till Medicaid kicks in and this is reasonable. She has yet to see GI in relation to this also. Also wants to see neurology down the line. 12/2/21: Since last visit, patient reports having established Medicaid now, and willing to move forward with her care. In process of establishing with primary care. Reports ongoing fatigue, as well as reported neuropathy of her hands and feet. Also notes occasional blood per rectum, and attributes this to hemorrhoids. Await blood work from today. We will plan for IV iron if low stores. She will also restart her B12 monthly subcu injections. She remains on folic acid daily. Will refer to GI and neurology. We will see her back in 3 months time.     3/2/2022: Since last visit has been following with GYN for menorrhagia, now on Provera p.o. Reports scheduled to see GI also next month for what she describes as hemorrhoidal bleeding. Blood work today with anemia with hemoglobin in 10 range, iron stores significantly low: Encouraged to proceed with IV iron x2. Monthly B12 injections subcu at home. She remains on folic acid daily. We will see her back in 3 months time. 6/29/2022: She returns today for follow up of her BARBER. In March she received IV iron x 2 and reports an improvement in her fatigue, however overall remains fatigued. She has been working to lose weight, down 16#. She denies any bleeding and is scheduled for endo with GI in August.  She continues on daily folic acid and monthly B12. She has recently been seen by ortho for back and LLE pain, had steroid injection with benefit. Labs reviewed, Hgb up from 10.6 to 13.3, TSAT improved to 38% from 11% and ferritin 105 up from 7. There is no current need for repeat iron. We will recheck her labs again in 3 months. PLAN:  - As above. IV iron prn. Regularly follows with GYN (has h/o menorrhagia, on OCP in past to regulate). Scheduled for endo with GI on 8/3  - B12 def: po B12 causes jitteriness. B12 monthly injection  - Folic acid: on po replacement   - High globulin: SPEP/LC 2018 and 12/21 unremarkable. ESR/CRP high in past    RTC in 3 months w labs, iron panel.          Eryn Villanueva) [address] [place] [address] Insurance Hematology [facility] Oncology  [address] [place] [address], [address] [place] [address]  Office : [phone]  Fax : [phone]

## 2022-07-13 ENCOUNTER — OFFICE VISIT (OUTPATIENT)
Dept: ORTHOPEDIC SURGERY | Age: 42
End: 2022-07-13
Payer: COMMERCIAL

## 2022-07-13 DIAGNOSIS — M43.16 SPONDYLOLISTHESIS OF LUMBAR REGION: ICD-10-CM

## 2022-07-13 DIAGNOSIS — M47.816 FACET ARTHROPATHY, LUMBAR: ICD-10-CM

## 2022-07-13 DIAGNOSIS — M48.062 LUMBAR STENOSIS WITH NEUROGENIC CLAUDICATION: Primary | ICD-10-CM

## 2022-07-13 DIAGNOSIS — M51.36 DDD (DEGENERATIVE DISC DISEASE), LUMBAR: ICD-10-CM

## 2022-07-13 DIAGNOSIS — M51.16 LUMBAR DISC HERNIATION WITH RADICULOPATHY: ICD-10-CM

## 2022-07-13 PROCEDURE — 99214 OFFICE O/P EST MOD 30 MIN: CPT | Performed by: PHYSICIAN ASSISTANT

## 2022-07-13 NOTE — PROGRESS NOTES
Name: Viridiana Husain  YOB: 1980  Gender: female  MRN: [id number]    CC: Back Pain (Recheck after injection with [facility] 6/15/22)         History of Present Illness: This is a very pleasant 43y.o. year old female who presented with a 9-month history of back pain with episodic radiation to the left buttock and lower extremity. Pain radiates in the left lateral hip, lateral leg to the lateral posterior calf. There is numbness and tingling associated. Pain is worse with standing and walking. Pain level gets up to 7 out of 10. She had 2 months of physical therapy prescribed by her primary care physician . The physical therapy was performed in January and February through [facility]. She felt stronger with her back but it did not alleviate the left leg pain. She has tried NSAIDs, Advil will provide minimal relief. She is on Lyrica for fibromyalgia. We ordered MRI scan of the lumbar spine. L4-5 there are degenerative changes and there is a large left paracentral disc extrusion with caudal migration creating mild spinal stenosis at L4-5. L5-S1 there is moderate  foraminal narrowing. L 5 S1 also has subtle anterior listhesis. We referred her for L4-5 epidural steroid injection. L4-5 level was not accessible. L3-4 epidural steroid injection was done. Patient reports 90% relief of her left leg pain still having excellent relief of her left leg pain. The injection was May 4 so this is lasted at least 2 months. She is getting more symptoms in the right hip and right lateral leg which is likely coming from the spondylolisthesis L5-S1 and the foraminal narrowing. She does desire another injection. She continues to work on weight loss. We discussed we would not be able to perform surgical intervention until she has significant weight loss. Current pain level on the right can get up to 8 out of 10.        AMB PAIN ASSESSMENT 7/13/2022   Severity of Pain 8       No Known Allergies   Current Outpatient Medications   Medication Sig Dispense Refill    citalopram (CELEXA) 40 MG tablet TAKE 1 TABLET BY MOUTH ONCE DAILY      DULoxetine (CYMBALTA) 60 MG extended release capsule TAKE 1 CAPSULE BY MOUTH ONCE DAILY      folic acid (FOLVITE) 1 MG tablet TAKE 1 TABLET BY MOUTH ONCE DAILY      EUTHYROX 88 MCG tablet TAKE 1 TABLET BY MOUTH ONCE DAILY BEFORE BREAKFAST      pregabalin (LYRICA) 150 MG capsule TAKE 1 CAPSULE BY MOUTH EVERY 8 HOURS AS NEEDED FOR PAIN MAX DAILY AMOUNT  MG USE SPARINGLY      ergocalciferol (ERGOCALCIFEROL) 1.25 MG (22151 UT) capsule Take 50,000 Units by mouth once a week      metFORMIN (GLUCOPHAGE) 500 MG tablet Take 500 mg by mouth 2 times daily (with meals)      cyanocobalamin 1000 MCG/ML injection Administer every 28 days. 1 mL 5     No current facility-administered medications for this visit. Past Surgical History:   Procedure Laterality Date    GASTRECTOMY  7/29/15    sleeve      Patient Active Problem List    Diagnosis Date Noted    Low serum vitamin B12 11/18/2020    H/O gastric bypass 77/89/1944    Folic acid deficiency 72/37/3032    BARBER (iron deficiency anemia)     Fatigue 08/25/2017    Fibromyalgia 10/07/2015    Recurrent herpes labialis 10/07/2015    Morbid obesity ([facility] [address] 07/29/2015    Hypothyroidism 03/25/2014    GERD (gastroesophageal reflux disease) 03/25/2014    Migraine 03/25/2014    Anxiety disorder 03/25/2014    Oligomenorrhea 03/25/2014         Tobacco Use: High Risk    Smoking Tobacco Use: Current Every Day Smoker    Smokeless Tobacco Use: Never Used         Alcohol Use:     Frequency of Alcohol Consumption: Not on file    Average Number of Drinks: Not on file    Frequency of Binge Drinking: Not on file            Radiographic Studies:     X-rays including AP and lateral views of the lumbar spine were reviewed: 3/7/22    AP of the lumbar spine shows mild rotation to the right. No scoliosis.   The sagittal view of the lumbar spine shows multilevel degenerative changes with facet arthropathy and degenerative disc disease at L4-5 and L5-S1. There appears to be some motion artifact on the  view, so difficult to see if there truly is an anterior listhesis but I suspect there may be subtle anterior listhesis L5-S1. X-ray impression: Advanced degenerative changes lumbar spine with facet arthropathy and degenerative disc disease    MRI Results (most recent):  Results from Appointment encounter on 03/30/22    MRI LUMB SPINE WO CONT    Narrative  Exam: MRI lumbar spine without contrast.  Indication: Low back pain and left leg pain radiating to the calf for 9 months,  no known trauma. Comparison: Lumbar spine radiographs dated March 07, 2022. Contrast: None. Technique: Multiplanar multisequence imaging of the lumbar spine without  contrast.      FINDINGS:  The last well-formed disk is designated as L5-S1 for the purpose of this report. Vertebral bodies were numbered using this convention. Retrolisthesis at L4-L5 measuring 4 mm. Vertebral body heights are preserved. Moderate degenerative disc changes at L4-L5 and mild at L1-L2 and L5-S1. There  are chronic anterior superior endplate degenerative changes of L2. No suspicious  osseous lesion. No evidence of acute lumbosacral spine fracture. Lumbar spinal  cord is normal in size and signal intensity. Prevertebral soft tissues are  unremarkable. T11-T12 and T12-L1: No disc bulge. No spinal canal stenosis. No neural foraminal  stenosis. L1-L2: Disc desiccation with a trace disc bulge without significant spinal canal  stenosis. No neural foraminal stenosis. L2-L3 and L3-L4: No disc bulge. No spinal canal stenosis. No neural foraminal  stenosis. L4-L5: Large central/left paracentral disc extrusion with caudal migration  causing mild overall spinal canal stenosis with this disc extrusion measuring 6  x 10 x 12 mm. Mild bilateral neural foraminal stenosis.     L5-S1: Minimal disc bulge without significant spinal canal stenosis. Moderate  left neural foraminal stenosis. Impression  1. Retrolisthesis at L4-L5 measuring 4 mm with moderate degenerative disc  changes at this level. 2. Large central/left paracentral disc extrusion with caudal migration causing  mild spinal canal stenosis at L4-L5 with this extrusion measuring approximately  6 x 10 x 12 mm. 3. Moderate left neural foraminal stenosis at L5-S1. I have independently reviewed the MRI scan of the lumbar spine. L4-5 there are degenerative changes and there is a large left paracentral disc extrusion with caudal migration creating mild spinal stenosis at L4-5. L5-S1 there is moderate left foraminal narrowing. Assessment/Plan:      ICD-10-CM    1. Lumbar stenosis with neurogenic claudication  M48.062    2. Facet arthropathy, lumbar  M47.816    3. Lumbar disc herniation with radiculopathy  M51.16    4. Spondylolisthesis of lumbar region  M43.16    5. DDD (degenerative disc disease), lumbar  M51.36         This patient's clinical history and physical exam is consistent with neurogenic claudication which is likely due to lumbar stenosis and spondylolisthesis. Lumbar disc herniation at L4-5 creating stenosis but also L5-S1 anterior listhesis with bulging disc and foraminal stenosis. I discussed the natural history of lumbar stenosis in that it is a degenerative condition that is usually slowly progressive resulting in gradual loss of mobility. I reassured the patient that this is not a condition that typically predisposes him to an acute paraplegia; however, the more neurologic deficits she acquires and the longer they go untreated, the less likely she is to have neurological improvements after an operation. She understands that conservative treatments typically include physical therapy, oral and/or epidural steroids, pain management, and simple observation.  And, that these treatments do not address the anatomical pinching of the spinal nerves, but rather help patients cope with the resulting symptoms.      - Injection: The patient will be referred for a lumbar steroid injection to help reduce the symptoms. The patient understands the risks including hyperglycemia, immunosuppression, meningitis, cerebrospinal fluid leak, epidural hematoma, and reaction to medication. The patient may benefit from additional injections depending on the response. The injection should be a L5-S1 epidural steroid injection will hopefully target foraminal narrowing L5-S1 and some of the medicine approach the L4-5 area as well. -Referral to A non-surgical spine provider for evaluation and treatment. No orders of the defined types were placed in this encounter. No orders of the defined types were placed in this encounter. 4 This is a chronic illness/condition with exacerbation and progression      Return for lumbar injection with [facility], eval/treat appt with Dr. Amina Robles. Eleonora Velasquez PA-C  07/13/22      Elements of this note were created using speech recognition software. As such, errors of speech recognition may be present.

## 2022-07-13 NOTE — PATIENT INSTRUCTIONS
Epidural Steroid Injection / Selective Nerve Root Block  What is it? An epidural steroid injection (RONALD) is an injection of an anti-inflammatory medication directly on the sac that covers the spinal cord and nerves. A Selective Nerve Root Block (SNRB) is an injection that is directed around a specific nerve. Your physician usually orders an injection  when you have symptoms of an irritated spinal nerve. These symptoms can include back, buttock or leg pain, weakness, or numbness. Irritated spinal nerves are often caused by disc herniations or a tight spinal canal (stenosis). The goal of the steroid injection is to reduce the inflammation around the spinal cord and nerves, which should reduce the amount of back and leg pain you are experiencing. Epidural injections are often done in series. It would not be unusual to have two or three injections in a row 10 to 14 days apart. The reason for multiple injections is that the relief from the injection may wear off over time. And sometimes it takes multiple doses of steroid injection to obtain the most anti-inflammatory effect around the nerves. How is it performed? You will be asked to lie on your side or stomach on the x-ray table. This will allow the physician to position you in the best way possible to access your back. Next, the skin will be cleaned and numbed with a local anesthetic. An X-ray machine will then be used to guide a small gauge needle into the space over your spinal sac. A small amount of dye will then be injected to insure the needle is in the proper position. Finally, a mixture of numbing medicine and anti-inflammatory (steroid/cortisone) will be injected. How long does it take? All together it should take about 1 hour. The back and legs may feel weak or numb for a couple of hours after the injection. Plan the have someone drive you home. Are there any restrictions after the RONALD?    Try to spend the remainder of the evening resting as much as possible. You may return to your normal activities the day after the procedure, including returning to work. What are the risks? The most common risk is a spinal headache. This happens when the needle passes through the spinal sac and can result in leakage of spinal fluid. This is usually treated with lying in a flat position and high fluid intake. Rarely, spinal headaches lasting more than a few days can be treated with a small procedure called a blood patch. Another risk, though very small, is the injection of the medication into one of the tiny blood vessels in the spinal canal.  This can result in serious complications such as seizures, cardiac arrest and even death. Fortunately, these complications are quite rare. Other rare complications include infection, bleeding into the spinal canal (epidural hematoma), loss of bladder control, and injury to a nerve with the needle. Who should not have an RONALD? The injection of a steroid anywhere in the body can cause a significant increase in the blood sugar level. Diabetics are very sensitive to steroids and should have them administered with caution. Diabetic patients can have injections but need to watch their blood sugar after the injection and discuss with their Primary Care Physician a plan to manage elevations in blood sugar. Steroids also decrease the body's ability to fight infection. Thus, any person with an active infection should not take a steroid medication until the infection has been cleared completely. If you are taking an antibiotic for an active infection, please notify our office.    Additionally, some patients may have anatomic abnormalities or have had previous back surgeries which may not allow the needle to pass into the spinal canal.     Medications that result in thinning of the blood such as coumadin, aspirin, Plavix, Eliquis, Xarelto and many anti-inflammatory medications need to be discontinued 5-7 days prior to the injection. Check with your doctor regarding specific drugs you are taking. Med    [facility]    MD Amarilis Villarreal MD Amy [place], PA-C Erby Heymann, NP    Main Office  [address] [facility], [facility] [place], [place], [address]        For Appointments at either location, please call [phone] that result in thinning of blood such as Coumadin, Aspirin, Plavix, and many anti-inflammatories, need to  Pre procedure teaching sheet: Epidural spinal injection, selective nerve root block injection, sacroiliac injection and facet block injections. You have been scheduled for spinal injection. This injection is to help decrease her back and or leg pain. A local anesthetic will be used to numb the area. Then, a spinal needle will be placed in the appropriate place according to the type of injection ordered by your physician. A steroid with or without local anesthetic will be injected. A small amount of contrast dye will be used to better visualize the injection site. You will be lying on your stomach. A series of 3 injections may be performed as these are ordered 2 to 3 weeks apart. Be aware, steroids can take 2 to 3 days to begin working, but can take 7 to 10 days for full effectiveness. Therefore, you may not have relief immediately. Please be patient and give the injection time to work. You should not resume any type of strenuous workout routine for at least 3 days following the procedure. Walking is fine. Prior to your procedure    - Please call your primary care physician if you are on blood thinners such as Coumadin, warfarin, heparin, Plavix, Lovenox, Effient, Eliquis, Xarelto, Brilinta, or aspirin or other blood thinner as these will need to be stopped for 3 to 7 days before the injections.   We will need verbal approval to stop the thinners and proceed with around the area to be injected. - The doctor will use a local anesthetic to numb the skin. This burns like a bee sting for about 20 seconds. As the needle is advanced, you will feel pressure. - Once the needle is in the appropriate space, the medication will be injected. Once the needle is removed, your back will be cleaned. You will move back to the exam room. - You are required to stay 20 to 30 minutes following the procedure. Although not expected you may have some numbness from the local anesthetic. If this were to interfere with her walking, you will not be discharged from the office until it is safe for you to leave. - Your discharge instructions and follow-up care will be discussed with you prior to leaving the office.           PRODUCTS THAT MAY THIN THE BLOOD    Medications, over-the-counter medications and herbal supplements    Aches-N-Pain    Disalcid   Meclenofenamate   Plavix  Acetylsalicylic acid (ASA)  Jose's Pills   Meclomen    Ponstel  Actron     Dolobid   Medipren    Relefen  Advil     Dristan    Mefenamic acid   Robaxisal  Aleve     Ecotrin    Meloxicam    Rufen  Abena-Theresa    Empirin   Menadol    Saleto  Anacin     Equagesic   Methocarbamol   Salsalate  Anaprox    Etodolac   Midol     Sine-aid  Ansaid     Excedrin   Mobic     Sine-off  Arthritis Pain formula   Feldene   Motrin     Sominex  Ascriptin    Fenoprofen   Nabumetone    Stanback  Aspergum    Feverfew   Nalfon     Sulindac  Aspirin     Florinal   Naprosyn    Talwin Compound  Katheryn     Flurbiprofen   Naproxen    Ticlid  BC Powders    Genpril    Norgesic    iclopidine  Bufferin    Goody's   Nuprin     Tolectin  Cataflam    Haltrin    Nyquil     Tolmetin  Clinoril     IBU    Orudis     Toradol  Clopidogrel    Ibuprofen   Oruvail     Trental  Coricidin    Indocin    Oxaprozin    Triclosan  Coumadin    Indomethacin   Pamprin    Vanquish  Darvon Compound   Ketoprofen   Pepto Bismol    Vitamin - E  Daypro     Ketorolac   Percodan    Voltaren  Diflunisal Kaopectate   Lovenox   Piroxicam    Warfarin    Diclofenac Lodine       Phenaphen    Xarelto  Eliquis       Enoxaparin

## 2022-07-13 NOTE — LETTER
Maurisio NOONAN  1980  MRN [id number]                                              ROOM NUMBER______      Radiographic Studies:    Cervical MRI      Thoracic MRI         Lumbar MRI          Pelvis MRI        CONTRAST    CT Myelogram: _______________   NCS/EMG ________________ ( UE  /  LE )     MRI of ___________________          Other: ____________________      Injections:    KNEE    HIP  Depomedrol _____ mg Euflexxa _____    _______________ TFESI/SNRB  _______________ SI Joint  _______________ RONALD    _______________ Facet  _______________Piriformis/ Sciatica      Medications:    Oral Steroids _______________  NSAIDS _______________    Muscle Relaxers _______________  Neurontin/Lyrica _______________    Pain Medicine _______________  Other _______________                       Physical Therapy:    Lumbar     Thoracic      Cervical     Hip       Knee       Shoulder               Traction          Ultrasound          Dry Needling      Referral:    Pain referral:  [facility]   [facility]   Other: ______________________________    Follow-up/ Refer__________________________________________________    Authorization to hold blood thinners:___________________________________

## 2022-07-27 ENCOUNTER — OFFICE VISIT (OUTPATIENT)
Dept: ORTHOPEDIC SURGERY | Age: 42
End: 2022-07-27
Payer: COMMERCIAL

## 2022-07-27 DIAGNOSIS — M54.17 LUMBOSACRAL RADICULOPATHY: Primary | ICD-10-CM

## 2022-07-27 PROCEDURE — 62323 NJX INTERLAMINAR LMBR/SAC: CPT | Performed by: PHYSICAL MEDICINE & REHABILITATION

## 2022-07-27 RX ORDER — TRIAMCINOLONE ACETONIDE 40 MG/ML
80 INJECTION, SUSPENSION INTRA-ARTICULAR; INTRAMUSCULAR ONCE
Status: COMPLETED | OUTPATIENT
Start: 2022-07-27 | End: 2022-07-27

## 2022-07-27 RX ADMIN — TRIAMCINOLONE ACETONIDE 80 MG: 40 INJECTION, SUSPENSION INTRA-ARTICULAR; INTRAMUSCULAR at 12:17

## 2022-07-27 NOTE — PROGRESS NOTES
Date: 07/27/22   Name: Price Paula    Pre-Procedural Diagnosis:    Diagnosis Orders   1. Lumbosacral radiculopathy  XR INJ SPINE THER SUBST LUM/SAC W IMG    triamcinolone acetonide (KENALOG-40) injection 80 mg          Procedure: Lumbar Epidural Steroid Injection (RONALD)    Precautions: [facility] Precautions spine injections: None. Patient denies any prior sensitivity to steroid, local anesthetic, contrast dye, iodine or shellfish. The procedure was discussed at length with the patient and informed consent was signed. The patient was placed in a prone position on the fluoroscopy table and the skin was prepped and draped in a routine sterile fashion. The area to be injected was anesthetized with approximately 5 cc of 1% Lidocaine. Initially a 22-gauge 5 inch inch spinal needle was carefully advanced under fluoroscopic guidance to the right L5-S1 epidural space. At this time 0.25 cc of omnipaque administered. . Once proper placement was confirmed, 3 cc of sterile water and 100 mg of Kenalog were injected through the spinal needle. Fluoroscopic guidance was used intermittently over a 10-minute period to insure proper needle placement and patient safety. A hard copy of the fluoroscopic  images has been placed in the patient's chart. The patient was monitored after the procedure and discharged home without complication.      Resume Meds:  N/A    Di Mckeon MD  07/27/22

## 2022-07-29 NOTE — PERIOP NOTE
Patient verified name and   Type: 1a; abbreviated assessment per anesthesia guidelines    Labs per anesthesia: not indicated    Instructed pt that they will be notified the day before their procedure by the GI Lab for time of arrival if their procedure is [place] and Pre-op for [address]. Arrival times should be called by 5 pm. If no phone is received the patient should contact their respective hospital. The GI lab telephone number is [phone] and ES Pre-op is [phone]. Follow diet and prep instructions per office including NPO status. If patient has NOT received instructions from office patient is advised to call surgeon office, verbalizes understanding. Bath or shower the night before and the am of surgery with non-mositurizing soap. No lotions, oils, powders, cologne on skin. No make up, eye make up or jewelry. Wear loose fitting comfortable, clean clothing. Must have adult present in building the entire time . Medications for the day of procedure lyrica, celexa, cymbalta, euthyrox. patient to hold vitamins, supplements and NSAIDS now for surgery    The following discharge instructions reviewed with patient: medication given during procedure may cause drowsiness for several hours, therefore, do not drive or operate machinery for remainder of the day. You may not drink alcohol on the day of your procedure, please resume regular diet and activity unless otherwise directed. You may experience abdominal distention for several hours that is relieved by the passage of gas. Contact your physician if you have any of the following: fever or chills, severe abdominal pain or excessive amount of bleeding or a large amount when having a bowel movement.  Occasional specks of blood with bowel movement would not be unusual.  Patient verbalized understanding and denied questions

## 2022-08-02 ENCOUNTER — ANESTHESIA EVENT (OUTPATIENT)
Dept: ENDOSCOPY | Age: 42
End: 2022-08-02
Payer: COMMERCIAL

## 2022-08-02 NOTE — PROGRESS NOTES
Called and confirmed procedure with patient on 8/3/22. All question and concerns answered at this time.

## 2022-08-03 ENCOUNTER — HOSPITAL ENCOUNTER (OUTPATIENT)
Age: 42
Setting detail: OUTPATIENT SURGERY
Discharge: ANOTHER HOSPITAL | End: 2022-08-03
Attending: INTERNAL MEDICINE | Admitting: INTERNAL MEDICINE
Payer: COMMERCIAL

## 2022-08-03 ENCOUNTER — ANESTHESIA (OUTPATIENT)
Dept: ENDOSCOPY | Age: 42
End: 2022-08-03
Payer: COMMERCIAL

## 2022-08-03 VITALS
TEMPERATURE: 98 F | DIASTOLIC BLOOD PRESSURE: 55 MMHG | HEIGHT: 64 IN | OXYGEN SATURATION: 100 % | BODY MASS INDEX: 49 KG/M2 | SYSTOLIC BLOOD PRESSURE: 111 MMHG | RESPIRATION RATE: 16 BRPM | WEIGHT: 287 LBS | HEART RATE: 47 BPM

## 2022-08-03 PROCEDURE — 3609027000 HC COLONOSCOPY: Performed by: INTERNAL MEDICINE

## 2022-08-03 PROCEDURE — 2580000003 HC RX 258: Performed by: ANESTHESIOLOGY

## 2022-08-03 PROCEDURE — 2580000003 HC RX 258: Performed by: NURSE ANESTHETIST, CERTIFIED REGISTERED

## 2022-08-03 PROCEDURE — 7100000011 HC PHASE II RECOVERY - ADDTL 15 MIN: Performed by: INTERNAL MEDICINE

## 2022-08-03 PROCEDURE — 2500000003 HC RX 250 WO HCPCS: Performed by: NURSE ANESTHETIST, CERTIFIED REGISTERED

## 2022-08-03 PROCEDURE — 3700000000 HC ANESTHESIA ATTENDED CARE: Performed by: INTERNAL MEDICINE

## 2022-08-03 PROCEDURE — 3609017100 HC EGD: Performed by: INTERNAL MEDICINE

## 2022-08-03 PROCEDURE — 7100000010 HC PHASE II RECOVERY - FIRST 15 MIN: Performed by: INTERNAL MEDICINE

## 2022-08-03 PROCEDURE — 3700000001 HC ADD 15 MINUTES (ANESTHESIA): Performed by: INTERNAL MEDICINE

## 2022-08-03 PROCEDURE — 6360000002 HC RX W HCPCS: Performed by: NURSE ANESTHETIST, CERTIFIED REGISTERED

## 2022-08-03 PROCEDURE — 2709999900 HC NON-CHARGEABLE SUPPLY: Performed by: INTERNAL MEDICINE

## 2022-08-03 RX ORDER — SODIUM CHLORIDE, SODIUM LACTATE, POTASSIUM CHLORIDE, CALCIUM CHLORIDE 600; 310; 30; 20 MG/100ML; MG/100ML; MG/100ML; MG/100ML
INJECTION, SOLUTION INTRAVENOUS CONTINUOUS
Status: DISCONTINUED | OUTPATIENT
Start: 2022-08-03 | End: 2022-08-03 | Stop reason: HOSPADM

## 2022-08-03 RX ORDER — PROPOFOL 10 MG/ML
INJECTION, EMULSION INTRAVENOUS CONTINUOUS PRN
Status: DISCONTINUED | OUTPATIENT
Start: 2022-08-03 | End: 2022-08-03 | Stop reason: SDUPTHER

## 2022-08-03 RX ORDER — SODIUM CHLORIDE 9 MG/ML
INJECTION, SOLUTION INTRAVENOUS PRN
Status: DISCONTINUED | OUTPATIENT
Start: 2022-08-03 | End: 2022-08-03 | Stop reason: HOSPADM

## 2022-08-03 RX ORDER — LIDOCAINE HYDROCHLORIDE 20 MG/ML
INJECTION, SOLUTION EPIDURAL; INFILTRATION; INTRACAUDAL; PERINEURAL PRN
Status: DISCONTINUED | OUTPATIENT
Start: 2022-08-03 | End: 2022-08-03 | Stop reason: SDUPTHER

## 2022-08-03 RX ORDER — MIDAZOLAM HYDROCHLORIDE 2 MG/2ML
2 INJECTION, SOLUTION INTRAMUSCULAR; INTRAVENOUS
Status: DISCONTINUED | OUTPATIENT
Start: 2022-08-03 | End: 2022-08-03 | Stop reason: HOSPADM

## 2022-08-03 RX ORDER — SODIUM CHLORIDE, SODIUM LACTATE, POTASSIUM CHLORIDE, CALCIUM CHLORIDE 600; 310; 30; 20 MG/100ML; MG/100ML; MG/100ML; MG/100ML
INJECTION, SOLUTION INTRAVENOUS CONTINUOUS PRN
Status: DISCONTINUED | OUTPATIENT
Start: 2022-08-03 | End: 2022-08-03 | Stop reason: SDUPTHER

## 2022-08-03 RX ORDER — FENTANYL CITRATE 50 UG/ML
25 INJECTION, SOLUTION INTRAMUSCULAR; INTRAVENOUS
Status: DISCONTINUED | OUTPATIENT
Start: 2022-08-03 | End: 2022-08-03 | Stop reason: HOSPADM

## 2022-08-03 RX ORDER — SODIUM CHLORIDE 0.9 % (FLUSH) 0.9 %
5-40 SYRINGE (ML) INJECTION EVERY 12 HOURS SCHEDULED
Status: DISCONTINUED | OUTPATIENT
Start: 2022-08-03 | End: 2022-08-03 | Stop reason: HOSPADM

## 2022-08-03 RX ORDER — PROPOFOL 10 MG/ML
INJECTION, EMULSION INTRAVENOUS PRN
Status: DISCONTINUED | OUTPATIENT
Start: 2022-08-03 | End: 2022-08-03 | Stop reason: SDUPTHER

## 2022-08-03 RX ORDER — FENTANYL CITRATE 50 UG/ML
100 INJECTION, SOLUTION INTRAMUSCULAR; INTRAVENOUS
Status: DISCONTINUED | OUTPATIENT
Start: 2022-08-03 | End: 2022-08-03 | Stop reason: HOSPADM

## 2022-08-03 RX ORDER — SODIUM CHLORIDE 0.9 % (FLUSH) 0.9 %
5-40 SYRINGE (ML) INJECTION PRN
Status: DISCONTINUED | OUTPATIENT
Start: 2022-08-03 | End: 2022-08-03 | Stop reason: HOSPADM

## 2022-08-03 RX ORDER — SODIUM CHLORIDE 9 MG/ML
INJECTION, SOLUTION INTRAVENOUS CONTINUOUS
Status: DISCONTINUED | OUTPATIENT
Start: 2022-08-03 | End: 2022-08-03 | Stop reason: HOSPADM

## 2022-08-03 RX ORDER — LIDOCAINE HYDROCHLORIDE 10 MG/ML
1 INJECTION, SOLUTION INFILTRATION; PERINEURAL
Status: DISCONTINUED | OUTPATIENT
Start: 2022-08-03 | End: 2022-08-03 | Stop reason: HOSPADM

## 2022-08-03 RX ADMIN — LIDOCAINE HYDROCHLORIDE 100 MG: 20 INJECTION, SOLUTION EPIDURAL; INFILTRATION; INTRACAUDAL; PERINEURAL at 13:57

## 2022-08-03 RX ADMIN — SODIUM CHLORIDE, POTASSIUM CHLORIDE, SODIUM LACTATE AND CALCIUM CHLORIDE: 600; 310; 30; 20 INJECTION, SOLUTION INTRAVENOUS at 13:09

## 2022-08-03 RX ADMIN — PROPOFOL 150 MCG/KG/MIN: 10 INJECTION, EMULSION INTRAVENOUS at 14:01

## 2022-08-03 RX ADMIN — SODIUM CHLORIDE, SODIUM LACTATE, POTASSIUM CHLORIDE, AND CALCIUM CHLORIDE: 600; 310; 30; 20 INJECTION, SOLUTION INTRAVENOUS at 13:57

## 2022-08-03 RX ADMIN — PROPOFOL 80 MG: 10 INJECTION, EMULSION INTRAVENOUS at 14:01

## 2022-08-03 ASSESSMENT — LIFESTYLE VARIABLES: SMOKING_STATUS: 1

## 2022-08-03 ASSESSMENT — PAIN - FUNCTIONAL ASSESSMENT: PAIN_FUNCTIONAL_ASSESSMENT: NONE - DENIES PAIN

## 2022-08-03 NOTE — PROCEDURES
Esophagogastroduodenoscopy (EGD) Procedure Note    Procedure: EGD    Pre-operative Diagnosis: BARBER   GERD    Post-operative Diagnosis: Nonerosive esophagitis - Mild erythema at the GE junction    Postoperative changes consistent with gastric sleeve-fairly subtle     Recommendations:  Capsule endoscopy     Follow up:   Capsule endoscopy     Anesthesia/Sedation:  MAC, (see separate report). Procedure Details:  Informed consent was obtained for the procedure, including sedation. Risks of perforation, hemorrhage, adverse drug reaction and aspiration were discussed. The patient was placed in the left lateral decubitus position. Based on the pre-procedure assessment, including review of the patient's medical history, medications, allergies, and review of systems, she had been deemed to be an appropriate candidate for anesthesia. The patient was monitored continuously with ECG tracing, pulse oximetry, blood pressure monitoring, and direct observations. Please refer to the anesthesia record for details and dosages of medications. The esophagus was intubated with direct visualization. The esophagus, stomach and duodenum were traversed to the full extent of the endoscope. Retroflexed views of the cardia and fundus were performed. The stomach was fully insufflated and deflated. Findings:   OROPHARYNX: The oropharynx was briefly viewed on entry and withdrawal with very brief evaluation of the cords, arytenoids and pyriform sinuses. No abnormalities found. ESOPHAGUS:  The esophagus was normal with an intact squamocolumnar z-line without erosions or evidence of Olmstead's intestinal metaplasia. There was mild erythema at the GE junction consistent with nonerosive reflux disease . The mucosa was normal.  There were no strictures, rings or noticeable narrowing of the lumen. The endoscope was easily passed into the gastric pouch and there was no apparent hiatal hernia.     STOMACH: The gastric pouch inflated and deflated normally. The mucosal surface and gastric rugae were normal without erosions, ulcerations, raised lesions, vascular ectasias or other anomalies. The pylorus was patent to passage of the endoscope without difficulty. DUODENUM:  The endoscope was easily passed into the third section of the duodenum. The villous mucosa was normal without blunting or scalloped folds. There were no mucosal erosions, ulcerations, raised lesions or vascular ectasias. EBL: 0 cc's. Pathology speciment:  None. Complications: No apparent complications and the patient tolerated sedation and the procedure well. Attending Attestation: I performed the procedure.     Esther Cheng MD

## 2022-08-03 NOTE — PROCEDURES
Colonoscopy Procedure Note    Procedure: Colonoscopy    Pre-operative Diagnosis: BARBER   BRBPR    Post-operative Diagnosis: Normal colonoscopy to the cecum. Small int hemorrhoids    Recommendations: The patient was advised not to drive today nor to make any important decisions. They may resume normal diet and medications unless otherwise instructed in recovery. Surveillance interval: 10 years with 2d prep   Capsule endoscopy    Anesthesia/Sedation: MAC,, (see separate report). Procedure Details:  Informed consent was obtained for the procedure, including anesthesia. Risks of perforation, hemorrhage, adverse drug reaction and aspiration were discussed. The patient was placed in the left lateral decubitus position. Based on the pre-procedure assessment, including review of the patient's medical history, medications, allergies, and review of systems, she had been deemed to be an appropriate candidate for this procedure. A rectal examination was performed. The colonoscope was inserted into the rectum and advanced under direct vision to the terminal ileum. The quality of the colonic preparation was fair but with copious washing was adequate. A careful inspection was made as the colonoscope was withdrawn; findings and interventions are described below. Findings:   TERMINAL ILEUM: The terminal ileum was not entered. COLON:  The colonic mucosa from the cecum to the rectum was carefully examined. The mucosa appeared normal with normal vascularity. There was no diverticulosis, inflammatory changes, mucosal polyps, raised lesions, vascular ectasias or abnormal pigmentation. ANUS/RECTUM: Anal exam reveals no masses or hemorrhoids, sphincter tone is normal. Rectal exam reveals no masses or hemorrhoids. Prostate exam was unremarkable for enlargement or nodules. Direct and retroflexed views of the rectum were normal except for small internal hemorrhoidal engorgement with mild inflammation.   No polyps or masses. EBL: 0cc's. PATH:  None. Complications: None; patient tolerated the procedure well. Attending Attestation: I performed the procedure.     Galindo Mcmillan MD

## 2022-08-03 NOTE — PROGRESS NOTES
Discharge instructions were reviewed with patient and pts significant other, Reyes Krebs. An opportunity was given for questions. Patient and Reyes Krebs verbalized understanding, and has no questions at this time. 1520-To lobby via wheelchair accompanied by staff. Discharged to home in good condition via private vehicle.

## 2022-08-03 NOTE — H&P
Chief complaint/HPI and PMH:  Please refer to outpatient note below or attached to the chart. Today's exam:  LUNGS:  Clear and equal.  COR:  RRR without murmurs heard. NEURO:  A and Oriented fully. I have thoroughly explained the preparation, procedure and sedation process to the patient as well as the risks and alternatives. They will sign informed consent prior to the procedure. Yasmine Barnes MD                    Patient:   Jim Burgess  YOB: 1980   Date:                        02/09/2022 10:30 AM   Visit Type:                  Consult  Provider:   Nilda Arriaza MD  Referring Provider:  Antonia Maddox MD [facility]    This 43year old  patient was referred by Antonia Maddox MD.  This 43year old female presents for BARBER. History of Present Illness:  1. BARBER   This is a 69-year-old woman who is had a fairly lengthy history with iron deficiency anemia but has had no formal workup for etiology. She started receiving iron infusions about 3 years ago. Most recently her hemoglobin was 12, MCV normal but iron studies were low. She does notice bright red blood per rectum at times. The bleeding is not mixed with the stool. She does occasionally have straining but it does not correlate with these episodes. She does have acid reflux symptoms fairly frequently and is not taking any medication for this. She denies dysphagia or melena. She denies NSAID abuse alcohol usage cigarette smoking or risks for peptic ulcer disease. She did have gastric stapling for obesity in the past. She also notes that her menses have gotten longer and heavier recently. She denies family history of colon polyps or colon cancer. ROS:  A complete 10 system review of symptoms was otherwise negative except for that mentioned in HPI and PMH.     Past Medical/Surgical History:   (Detailed)  Additional Medical History  BARBER - had onset of this 3years ago and treated with infusions but no formal w/u. Heartburn - untreated. Fibromytalgia on lyrica and antidepressants. Obesity  Heavy menses recently. Additional Surgical History  Gastric sleeve - 2015 - lost 80 lbs but lyrica caused her to gain. Family History:  (Detailed)  Additional Family History  No immediate family history of gastrointestinal cancer or colon polyps, inflammatory bowel disease, peptic ulcer disease or other pertinent family history was reported. Social History:  (Detailed)  Smoker - 1/2ppd. ETOH rare social events. Preferred language is Lucian Ramsey. Current Medications:  Medication Generic Name Directions   Celexa 20 mg tablet citalopram hydrobromide take 1 tablet by oral route  every day   duloxetine duloxetine HCl take 1 Capsule by oral route  every day   folic acid folic acid take 1 tablet by oral route  every day   levothyroxine 88 mcg tablet levothyroxine sodium take 1 tablet by oral route  every day   pregabalin 150 mg capsule pregabalin take 1 capsule by oral route 3 times every day   Vitamin D3 25 mcg (1,000 unit) tablet cholecalciferol (vitamin D3) takes 1 by mouth every day     Allergies:  Ingredient Reaction (Severity) Medication Name Comment   ACETAMINOPHEN  Excedrin Extra Strength Vomiting   ASPIRIN  Excedrin Extra Strength Vomiting   CAFFEINE  Excedrin Extra Strength Vomiting   Reviewed, updated. Vital Signs:  BP mm/Hg Pulse Resp Pulse Ox Temp F Ht (Total in.) Weight (lbs.) Weight (oz.) BMI   130/86 82 20 99 97.20 64.00 306.00  52.52     Physical Exam:  Due to the corona virus outbreak, formal examination was not performed at this visit in order to preserve social distancing measures  GENERAL APPEARANCE: Well-developed, well-nourished adult in no apparent distress. HEENT: Sclerae were anicteric. NEUROLOGICAL: Alert and fully oriented. No gait problems noted. No resting tremor. No obvious deficits. PSYCHIATRIC:  Normal mood and affect.   SKIN: No obvious rashes apparent in exposed skin.    Labs/X-Rays:  Any past endoscopies, pertinent path, recent labs and imaging studies have been reviewed, discussed and scanned into the computer record. Assessment/Plan:  # Detail Type Description    1. Assessment Chronic GERD (K21.9). 2. Assessment Iron deficiency anemia, unspecified iron deficiency anemia type (D50.9). Plan Orders She is to schedule a follow-up visit to be determined after testing/procedure. 3. Assessment Bright red blood per rectum (K62.5). Provider Plan We will evaluate with EGD and colonoscopy for a source of occult chronic blood loss and iron deficiency. We will also evaluate with these procedures for determination of how severe her acid refluxes, whether she has a large hiatal hernia or Buster lesions, for a source of painless hematochezia which occurs intermittently with bowel movements. Further disposition or completion of the GI survey with capsule endoscopy will depend the results. She does have heavy menses and should discuss this with her primary doctor or gynecologist. This could be the cause of her iron deficiency. Plan Orders Further evaluations ordered today include [place] W/WO Cytology [id number] to be performed and EGD W/WO Cytology to be performed. The patient was checked out at 11:32 AM by Latrell Elise. Elements of this note may have been dictated using speech recognition software. As a result, errors of speech recognition may have occurred. Provider:   Fabiene Brittle 02/09/2022 11:34 AM   Document generated by: Mala Blackmon. Bob Conley MD 02/09/2022    CC Providers:  Latrice Arciniega MD   [facility], [address] [place] [address]-      Electronically signed by Mala Blackmon.  Bob Conley MD on 02/14/2022 02:04 PM

## 2022-08-03 NOTE — ANESTHESIA PRE PROCEDURE
Department of Anesthesiology  Preprocedure Note       Name:  Mckenna Garza   Age:  43 y.o.  :  1980                                          MRN:  [id number]         Date:  8/3/2022      Surgeon: Gavin Jin):  Sejal Watson MD    Procedure: Procedure(s):  EGD ESOPHAGOGASTRODUODENOSCOPY  COLORECTAL CANCER SCREENING, NOT HIGH RISK *waiting on ride to get to hospital before procedure can start*    Medications prior to admission:   Prior to Admission medications    Medication Sig Start Date End Date Taking? Authorizing Provider   Cholecalciferol (VITAMIN D3 PO) Take by mouth every evening   Yes Historical Provider, MD   Multiple Vitamin (MULTIVITAMIN ADULT PO) Take by mouth daily   Yes Historical Provider, MD   Syringe, Disposable, 1 ML MISC To be used with monthly vitamin b12 shots 22   Yuliya Humphrey MD   citalopram (CELEXA) 40 MG tablet TAKE 1 TABLET BY MOUTH ONCE DAILY 5/15/22   Historical Provider, MD   DULoxetine (CYMBALTA) 60 MG extended release capsule TAKE 1 CAPSULE BY MOUTH ONCE DAILY 22   Historical Provider, MD   folic acid (FOLVITE) 1 MG tablet TAKE 1 TABLET BY MOUTH ONCE DAILY 5/15/22   Historical Provider, MD   EUTHYROX 88 MCG tablet TAKE 1 TABLET BY MOUTH ONCE DAILY BEFORE BREAKFAST 22   Historical Provider, MD   pregabalin (LYRICA) 150 MG capsule TAKE 1 CAPSULE BY MOUTH EVERY 8 HOURS AS NEEDED FOR PAIN MAX DAILY AMOUNT  MG USE SPARINGLY 22   Historical Provider, MD   cyanocobalamin 1000 MCG/ML injection Administer every 28 days.  22   KRISSY Ornelas - CNP       Current medications:    Current Facility-Administered Medications   Medication Dose Route Frequency Provider Last Rate Last Admin    lidocaine 1 % injection 1 mL  1 mL IntraDERmal Once PRN Criss Garcia MD        fentaNYL (SUBLIMAZE) injection 25 mcg  25 mcg IntraVENous Once PRN Criss Garcia MD        Or    fentaNYL (SUBLIMAZE) injection 100 mcg  100 mcg IntraVENous Once PRN Blair Logan Sebastien Chase MD        0.9 % sodium chloride infusion   IntraVENous Continuous Tavia Philip MD        lactated ringers infusion   IntraVENous Continuous Tavia Philip  mL/hr at 08/03/22 1309 New Bag at 08/03/22 1309    sodium chloride flush 0.9 % injection 5-40 mL  5-40 mL IntraVENous 2 times per day Tavia Philip MD        sodium chloride flush 0.9 % injection 5-40 mL  5-40 mL IntraVENous PRN Tavia Philip MD        0.9 % sodium chloride infusion   IntraVENous PRN Tavia Philip MD        midazolam PF (VERSED) injection 2 mg  2 mg IntraVENous Once PRN Tavia Philip MD        lactated ringers infusion   IntraVENous Continuous Lesly Zarate MD           Allergies:  No Known Allergies    Problem List:    Patient Active Problem List   Diagnosis Code    Hypothyroidism E03.9    H/O gastric bypass Z98.84    GERD (gastroesophageal reflux disease) K21.9    Fatigue R53.83    Fibromyalgia M79.7    Migraine G43.909    Anxiety disorder F41.9    Low serum vitamin T03 Q79.4    Folic acid deficiency E66.9    Oligomenorrhea N91.5    BARBER (iron deficiency anemia) D50.9    Morbid obesity ([address] E66.01    Recurrent herpes labialis B00.1       Past Medical History:        Diagnosis Date    Anemia     Anxiety     medication    COVID-19 2021    Fibromyalgia     managed with medication    GERD (gastroesophageal reflux disease) 3/25/2014    H/O seasonal allergies     Hypothyroidism 3/25/2014    managed with medication    Migraine 3/25/2014    managed with medication    Morbid obesity ([address] 3/25/2014    Oligomenorrhea 3/25/2014    Ovarian cyst 3/25/2014    PCOS (polycystic ovarian syndrome)     managed with metformin    Trichomonosis     Vitamin D deficiency     managed with medication       Past Surgical History:        Procedure Laterality Date    GASTRECTOMY  7/29/15    sleeve       Social History:    Social History     Tobacco Use    Smoking status: Every Day     Packs/day: 0.50     Types: Cigarettes     Last attempt to quit: 2008     Years since quittin.5    Smokeless tobacco: Never   Substance Use Topics    Alcohol use: No                                Ready to quit: Not Answered  Counseling given: Not Answered      Vital Signs (Current):   Vitals:    22 1100 22 1301   BP:  (!) 172/85   Pulse:  61   Resp:  16   Temp:  97.7 °F (36.5 °C)   TempSrc:  Oral   SpO2:  98%   Weight: 287 lb (130.2 kg)    Height: 5' 4\" (1.626 m)                                               BP Readings from Last 3 Encounters:   22 (!) 172/85   22 (!) 145/88   22 128/76       NPO Status: Time of last liquid consumption:                         Time of last solid consumption:                         Date of last liquid consumption: 22                        Date of last solid food consumption: 22    BMI:   Wt Readings from Last 3 Encounters:   22 287 lb (130.2 kg)   22 290 lb 6.4 oz (131.7 kg)   22 296 lb (134.3 kg)     Body mass index is 49.26 kg/m².     CBC:   Lab Results   Component Value Date/Time    WBC 7.1 2022 09:06 AM    RBC 4.49 2022 09:06 AM    HGB 13.3 2022 09:06 AM    HCT 40.6 2022 09:06 AM    MCV 90.4 2022 09:06 AM    RDW 16.0 2022 09:06 AM     2022 09:06 AM       CMP:   Lab Results   Component Value Date/Time     2022 09:06 AM    K 3.8 2022 09:06 AM     2022 09:06 AM    CO2 25 2022 09:06 AM    BUN 6 2022 09:06 AM    CREATININE 0.70 2022 09:06 AM    GFRAA >60 2022 09:06 AM    AGRATIO 0.8 2022 10:10 AM    LABGLOM >60 2022 09:06 AM    GLUCOSE 80 2022 09:06 AM    PROT 6.2 2022 09:06 AM    CALCIUM 8.8 2022 09:06 AM    BILITOT 0.4 2022 09:06 AM    ALKPHOS 50 2022 09:06 AM    ALKPHOS 76 2022 10:10 AM    AST 13 2022 09:06 AM    ALT 20 2022 09:06 AM       POC Tests: No results for input(s): POCGLU, POCNA, POCK, POCCL, POCBUN, POCHEMO, POCHCT in the last 72 hours. Coags: No results found for: PROTIME, INR, APTT    HCG (If Applicable): No results found for: PREGTESTUR, PREGSERUM, HCG, HCGQUANT     ABGs: No results found for: PHART, PO2ART, EEP8HWI, WGQ8PPI, BEART, [id number]     Type & Screen (If Applicable):  No results found for: LABABO, LABRH    Drug/Infectious Status (If Applicable):  No results found for: HIV, HEPCAB    COVID-19 Screening (If Applicable): No results found for: COVID19        Anesthesia Evaluation  Patient summary reviewed  Airway: Mallampati: II  TM distance: >3 FB   Neck ROM: full  Mouth opening: > = 3 FB   Dental:          Pulmonary:normal exam    (+) current smoker                           Cardiovascular:                      Neuro/Psych:   (+) depression/anxiety             GI/Hepatic/Renal:   (+) GERD:, morbid obesity         ROS comment: H/O gastric bypass. Endo/Other:    (+) hypothyroidism::., .                  ROS comment: polycystic ovarian syndrome Abdominal:             Vascular: Other Findings:           Anesthesia Plan      TIVA     ASA 3             Anesthetic plan and risks discussed with patient.                         Madeline Snow MD   8/3/2022

## 2022-08-03 NOTE — ANESTHESIA POSTPROCEDURE EVALUATION
Department of Anesthesiology  Postprocedure Note    Patient: Pat Cervantes  MRN: [id number]  YOB: 1980  Date of evaluation: 8/3/2022      Procedure Summary     Date: 08/03/22 Room / Location: [facility] ENDO 04 / [facility] ENDOSCOPY    Anesthesia Start: 1642 Anesthesia Stop: 9645    Procedures:       EGD ESOPHAGOGASTRODUODENOSCOPY (Upper GI Region)      COLORECTAL CANCER SCREENING, NOT HIGH RISK (Lower GI Region) Diagnosis:       Hemorrhage of rectum      Iron deficiency anemia, unspecified iron deficiency anemia type      (Hemorrhage of rectum [K62.5])      (Iron deficiency anemia, unspecified iron deficiency anemia type [D50.9])    Surgeons: Gomez Michael MD Responsible Provider: Annmarie Henao MD    Anesthesia Type: TIVA ASA Status: 3          Anesthesia Type: No value filed.     Nba Phase I: Nba Score: 10    Nba Phase II: Nba Score: 8      Anesthesia Post Evaluation    Patient location during evaluation: PACU  Patient participation: complete - patient participated  Level of consciousness: awake  Airway patency: patent  Nausea & Vomiting: no nausea  Complications: no  Cardiovascular status: blood pressure returned to baseline and hemodynamically stable  Respiratory status: acceptable  Hydration status: stable  Multimodal analgesia pain management approach

## 2022-09-21 DIAGNOSIS — M79.7 FIBROMYALGIA: Primary | ICD-10-CM

## 2022-09-21 RX ORDER — PREGABALIN 150 MG/1
CAPSULE ORAL
Qty: 90 CAPSULE | Refills: 0 | OUTPATIENT
Start: 2022-09-21

## 2022-09-21 RX ORDER — PREGABALIN 150 MG/1
CAPSULE ORAL
Qty: 90 CAPSULE | Refills: 0 | Status: SHIPPED | OUTPATIENT
Start: 2022-09-21 | End: 2022-10-24

## 2022-09-21 NOTE — TELEPHONE ENCOUNTER
Request for refill of lyrica to [facility] at [facility]; patient is out of medication

## 2022-09-27 DIAGNOSIS — D50.9 IRON DEFICIENCY ANEMIA, UNSPECIFIED IRON DEFICIENCY ANEMIA TYPE: Primary | ICD-10-CM

## 2022-09-28 ENCOUNTER — OFFICE VISIT (OUTPATIENT)
Dept: ONCOLOGY | Age: 42
End: 2022-09-28
Payer: COMMERCIAL

## 2022-09-28 ENCOUNTER — HOSPITAL ENCOUNTER (OUTPATIENT)
Dept: LAB | Age: 42
Discharge: HOME | End: 2022-10-01
Payer: COMMERCIAL

## 2022-09-28 VITALS
BODY MASS INDEX: 48.14 KG/M2 | RESPIRATION RATE: 20 BRPM | TEMPERATURE: 98.1 F | WEIGHT: 282 LBS | HEART RATE: 54 BPM | DIASTOLIC BLOOD PRESSURE: 77 MMHG | HEIGHT: 64 IN | SYSTOLIC BLOOD PRESSURE: 123 MMHG | OXYGEN SATURATION: 98 %

## 2022-09-28 DIAGNOSIS — R53.83 FATIGUE, UNSPECIFIED TYPE: ICD-10-CM

## 2022-09-28 DIAGNOSIS — D50.9 IRON DEFICIENCY ANEMIA, UNSPECIFIED IRON DEFICIENCY ANEMIA TYPE: ICD-10-CM

## 2022-09-28 DIAGNOSIS — E53.8 VITAMIN B12 DEFICIENCY: ICD-10-CM

## 2022-09-28 DIAGNOSIS — D50.9 IRON DEFICIENCY ANEMIA, UNSPECIFIED IRON DEFICIENCY ANEMIA TYPE: Primary | ICD-10-CM

## 2022-09-28 DIAGNOSIS — E53.8 FOLIC ACID DEFICIENCY: ICD-10-CM

## 2022-09-28 LAB
ALBUMIN SERPL-MCNC: 2.9 G/DL (ref 3.5–5)
ALBUMIN/GLOB SERPL: 0.9 {RATIO} (ref 1.2–3.5)
ALP SERPL-CCNC: 52 U/L (ref 50–136)
ALT SERPL-CCNC: 21 U/L (ref 12–65)
ANION GAP SERPL CALC-SCNC: 3 MMOL/L (ref 4–13)
AST SERPL-CCNC: 12 U/L (ref 15–37)
BASOPHILS # BLD: 0 K/UL (ref 0–0.2)
BASOPHILS NFR BLD: 1 % (ref 0–2)
BILIRUB SERPL-MCNC: 0.4 MG/DL (ref 0.2–1.1)
BUN SERPL-MCNC: 8 MG/DL (ref 6–23)
CALCIUM SERPL-MCNC: 8.6 MG/DL (ref 8.3–10.4)
CHLORIDE SERPL-SCNC: 111 MMOL/L (ref 101–110)
CO2 SERPL-SCNC: 28 MMOL/L (ref 21–32)
CREAT SERPL-MCNC: 0.8 MG/DL (ref 0.6–1)
DIFFERENTIAL METHOD BLD: NORMAL
EOSINOPHIL # BLD: 0.1 K/UL (ref 0–0.8)
EOSINOPHIL NFR BLD: 1 % (ref 0.5–7.8)
ERYTHROCYTE [DISTWIDTH] IN BLOOD BY AUTOMATED COUNT: 13.9 % (ref 11.9–14.6)
FERRITIN SERPL-MCNC: 70 NG/ML (ref 8–388)
GLOBULIN SER CALC-MCNC: 3.3 G/DL (ref 2.3–3.5)
GLUCOSE SERPL-MCNC: 84 MG/DL (ref 65–100)
HCT VFR BLD AUTO: 42.7 % (ref 35.8–46.3)
HGB BLD-MCNC: 13.6 G/DL (ref 11.7–15.4)
HGB RETIC QN AUTO: 34 PG (ref 29–35)
IMM GRANULOCYTES # BLD AUTO: 0 K/UL (ref 0–0.5)
IMM GRANULOCYTES NFR BLD AUTO: 0 % (ref 0–5)
IMM RETICS NFR: 6.4 % (ref 3–15.9)
IRON SATN MFR SERPL: 19 %
IRON SERPL-MCNC: 50 UG/DL (ref 35–150)
LYMPHOCYTES # BLD: 2.4 K/UL (ref 0.5–4.6)
LYMPHOCYTES NFR BLD: 30 % (ref 13–44)
MCH RBC QN AUTO: 30.2 PG (ref 26.1–32.9)
MCHC RBC AUTO-ENTMCNC: 31.9 G/DL (ref 31.4–35)
MCV RBC AUTO: 94.7 FL (ref 79.6–97.8)
MONOCYTES # BLD: 0.6 K/UL (ref 0.1–1.3)
MONOCYTES NFR BLD: 8 % (ref 4–12)
NEUTS SEG # BLD: 4.9 K/UL (ref 1.7–8.2)
NEUTS SEG NFR BLD: 60 % (ref 43–78)
NRBC # BLD: 0 K/UL (ref 0–0.2)
PLATELET # BLD AUTO: 248 K/UL (ref 150–450)
PMV BLD AUTO: 10.6 FL (ref 9.4–12.3)
POTASSIUM SERPL-SCNC: 4.4 MMOL/L (ref 3.5–5.1)
PROT SERPL-MCNC: 6.2 G/DL (ref 6.3–8.2)
RBC # BLD AUTO: 4.51 M/UL (ref 4.05–5.2)
RETICS # AUTO: 0.07 M/UL (ref 0.03–0.1)
RETICS/RBC NFR AUTO: 1.6 % (ref 0.3–2)
SODIUM SERPL-SCNC: 142 MMOL/L (ref 136–145)
TIBC SERPL-MCNC: 267 UG/DL (ref 250–450)
WBC # BLD AUTO: 8.1 K/UL (ref 4.3–11.1)

## 2022-09-28 PROCEDURE — 85025 COMPLETE CBC W/AUTO DIFF WBC: CPT

## 2022-09-28 PROCEDURE — 36415 COLL VENOUS BLD VENIPUNCTURE: CPT

## 2022-09-28 PROCEDURE — 83540 ASSAY OF IRON: CPT

## 2022-09-28 PROCEDURE — 99214 OFFICE O/P EST MOD 30 MIN: CPT | Performed by: INTERNAL MEDICINE

## 2022-09-28 PROCEDURE — 82728 ASSAY OF FERRITIN: CPT

## 2022-09-28 PROCEDURE — 85046 RETICYTE/HGB CONCENTRATE: CPT

## 2022-09-28 PROCEDURE — 80053 COMPREHEN METABOLIC PANEL: CPT

## 2022-09-28 RX ORDER — ACETAMINOPHEN 325 MG/1
650 TABLET ORAL
Status: CANCELLED | OUTPATIENT
Start: 2022-10-11

## 2022-09-28 RX ORDER — SODIUM CHLORIDE 0.9 % (FLUSH) 0.9 %
5-40 SYRINGE (ML) INJECTION PRN
Status: CANCELLED | OUTPATIENT
Start: 2022-10-11

## 2022-09-28 RX ORDER — HEPARIN SODIUM (PORCINE) LOCK FLUSH IV SOLN 100 UNIT/ML 100 UNIT/ML
500 SOLUTION INTRAVENOUS PRN
Status: CANCELLED | OUTPATIENT
Start: 2022-10-11

## 2022-09-28 RX ORDER — ONDANSETRON 2 MG/ML
8 INJECTION INTRAMUSCULAR; INTRAVENOUS
Status: CANCELLED | OUTPATIENT
Start: 2022-10-11

## 2022-09-28 RX ORDER — EPINEPHRINE 1 MG/ML
0.3 INJECTION, SOLUTION, CONCENTRATE INTRAVENOUS PRN
Status: CANCELLED | OUTPATIENT
Start: 2022-10-11

## 2022-09-28 RX ORDER — FAMOTIDINE 10 MG/ML
20 INJECTION, SOLUTION INTRAVENOUS
Status: CANCELLED | OUTPATIENT
Start: 2022-10-11

## 2022-09-28 RX ORDER — ALBUTEROL SULFATE 90 UG/1
4 AEROSOL, METERED RESPIRATORY (INHALATION) PRN
Status: CANCELLED | OUTPATIENT
Start: 2022-10-11

## 2022-09-28 RX ORDER — SODIUM CHLORIDE 9 MG/ML
INJECTION, SOLUTION INTRAVENOUS CONTINUOUS
Status: CANCELLED | OUTPATIENT
Start: 2022-10-11

## 2022-09-28 RX ORDER — SODIUM CHLORIDE 9 MG/ML
5-250 INJECTION, SOLUTION INTRAVENOUS PRN
Status: CANCELLED | OUTPATIENT
Start: 2022-10-11

## 2022-09-28 RX ORDER — DIPHENHYDRAMINE HYDROCHLORIDE 50 MG/ML
50 INJECTION INTRAMUSCULAR; INTRAVENOUS
Status: CANCELLED | OUTPATIENT
Start: 2022-10-11

## 2022-09-28 ASSESSMENT — PATIENT HEALTH QUESTIONNAIRE - PHQ9
SUM OF ALL RESPONSES TO PHQ QUESTIONS 1-9: 0
SUM OF ALL RESPONSES TO PHQ9 QUESTIONS 1 & 2: 0
1. LITTLE INTEREST OR PLEASURE IN DOING THINGS: 0
2. FEELING DOWN, DEPRESSED OR HOPELESS: 0
SUM OF ALL RESPONSES TO PHQ QUESTIONS 1-9: 0

## 2022-09-28 NOTE — PROGRESS NOTES
Data Source: Patient, [facility] record. 3/2/2022    8:58 AM    Sayra Antony [id number]    43 y.o. Patient Encounter: [facility] Visit    Heme Diagnosis:  BARBER  Heme History (Copied from prior):   Rima Yarbrough is a 45 y.o. female h/o migraines, chronic fatigue, fibromyalgia, neuropathy, being referred for anemia. Ms. Moreno Villar presented to Dr. Raul Nieves on 11/6/2018  to establish care. Lab work completed in July by previous primary care was evaluated and revealed her hemoglobin was 9.4. According to [address] she was unable to be reached regarding her lab work. There is mention of complaints of fatigue at that time. She was diagnosed with fibromyalgia about 6 years ago, however has not seen a rheumatologist or neurologist. Has had multiple courses of PT including the fibromyalgia program. Has done water PT. Saw Dr. Gabriele Kiser who gave her multiple injections with no benefit. NCS apparently showed neuropathy. She is on Lyrica and Cymbalta. .   States that her periods are sometimes heavy. Has been on iron supplements in the past but they cause constipation. Most recent hgb was 9.4.      ASSESSMENT:  Mixed nutritional deficiency with symptomatic iron deficiency and malabsorption  1) IV iron-se fully addressed  -iron stores normal, hgb stable  -repeat in 6 months     12/17/19 injectafer x 2; follow up 1 year     2) Oral folic acid 1mg and T24 1000mcg and repeat in 3mths  -non compliance, continue oral meds and rerefer to GI for scope     3) refer to GI  Will need further eval if does not respond to po as gastric sleeve explains BARBER but not folic acid and low normal b12  Further anemia work up pending-all work up negative  No improvement in fatigue, b12 low so start b12  RTC in 12 months     11/12/20  Not taking B12  Did not go to GI  States her tingling/m-s symptoms are worse  Sent here early b/o abnormal alb, globulin but normal TP and has had 3+ years of mild low alb and elevated TP  -paraprotein work up negative in 2018  hgb stable, no insurance, no sponsorship, finances are a concern  -schedule a 6month visit, pt. Wants to wait on expenses until sponsorship or insurance and then pursue paraprotein work up, likely will need IV iron at that time, product pending insurance status, given her h/o gastric sleeve mary jane. 5 years ago  -h/o low normal B12 and non compliance so don't have a great explanation for that except nutrition, but pt. Non adherent with GI work up--IF and anti-parietal negative     Transition to adult heme in 6 months  Call if issues  Pt. Agrees with plan      Interval History:  9/28/2022: Reports being regular p.o. folic acid and monthly B12 injections (which she self administers at home). Regular with her GYN, notes ongoing menorrhagia. Has seen GI and underwent EGD/colonoscopy 8/3/2022 showing nonerosive esophagitis, and small internal hemorrhoids only. Fatigue somewhat worse of late. Blood work today without anemia, however iron stores following: Will repeat IV iron x2. NCCN Distress Score:  0    REVIEW OF SYSTEMS:  As mentioned above, all other systems were reviewed in full and are negative. Past Medical History:   Diagnosis Date    Anemia     Fibromyalgia     managed with medication    GERD (gastroesophageal reflux disease) 3/25/2014    H/O seasonal allergies     Hypothyroidism 3/25/2014    managed with medication    Migraine 3/25/2014    managed with medication    Morbid obesity ([address] 3/25/2014    Oligomenorrhea 3/25/2014    Ovarian cyst 3/25/2014    PCOS (polycystic ovarian syndrome)     managed with metformin    Trichomonosis     Vitamin D deficiency     managed with medication       Past Surgical History:   Procedure Laterality Date    HX GASTRECTOMY  7/29/15    sleeve       Current Outpatient Medications   Medication Sig Dispense Refill    medroxyPROGESTERone (PROVERA) 10 mg tablet Take 1 Tablet by mouth daily.  (Patient taking differently: Take 10 mg by mouth daily. prn) 10 Tablet 0    citalopram (CELEXA) 40 mg tablet Take 1 Tablet by mouth daily. 90 Tablet 3    DISABLED PLACARD (DISABLED PLACARD) DMV Handicap placard 1 Each 0    DULoxetine (CYMBALTA) 60 mg capsule Take 1 capsule by mouth once daily 90 Capsule 3    pregabalin (Lyrica) 150 mg capsule Take 1 Capsule by mouth three (3) times daily. Max Daily Amount: 450 mg. Take 1 cap PO Q 8 hours PRN pain, use sparingly 180 Capsule 5    levothyroxine (SYNTHROID) 88 mcg tablet Take 1 Tablet by mouth Daily (before breakfast). 90 Tablet 3    cyanocobalamin (VITAMIN B12) 1,000 mcg/mL injection 1 mL by SubCUTAneous route every thirty (30) days. (Patient not taking: Reported on 2022) 3 mL 3    valACYclovir (VALTREX) 1 gram tablet 2 tabs at onset, then 2 tabs 12 hours later, for a total of 2 doses, NEVER TAKE WITH TIZANIDINE/ZANAFLEX 4 Tablet 0    folic acid (FOLVITE) 1 mg tablet Take 1 tablet by mouth once daily 30 Tab 5    ibuprofen (ADVIL) 200 mg tablet Take  by mouth as needed for Pain. LORATADINE (CLARITIN PO) Take  by mouth. cholecalciferol (VITAMIN D3) 1,000 unit cap Take 1,000 Units by mouth two (2) times a day. ZOLMitriptan (ZOMIG) 5 mg tablet Take 1 Tab by mouth as needed for Migraine. 6 Tab 5    multivitamin (ONE A DAY) tablet Take 1 tablet by mouth daily. 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     Social Determinants of Health     Financial Resource Strain: High Risk    Difficulty of Paying Living Expenses: Very hard   Food Insecurity: No Food Insecurity    Worried About Running Out of Food in the Last Year: Never true    Ran Out of Food in the Last Year: Never true   Transportation Needs: No Transportation Needs    Lack of Transportation (Medical): No    Lack of Transportation (Non-Medical): No       Family History   Problem Relation Age of Onset    Diabetes Father     Hypertension Father     Hypertension Mother     COPD Mother     Diabetes Paternal Grandmother     Other Paternal Grandmother         thalassemia    Cancer Paternal Grandfather         prostate    Coronary Art Dis Other         unknown family member    Cancer Other         unknown aunt (melanoma)       Allergies   Allergen Reactions    Excedrin P.M. [Diphenhydramine-Acetaminophen] Nausea and Vomiting    Excedrin [Acetaminophen-Caffeine] Nausea and Vomiting    Nabumetone Other (comments)     No help      Pepto-Bismol [Bismuth Subsalicylate] Nausea and Vomiting       PHYSICAL EXAMINATION:  General Appearance: Healthy appearing patient in no acute distress  Vitals reviewed. Visit Vitals  BP (!) 117/91   Pulse 80   Temp 98.8 °F (37.1 °C) (Oral)   Resp 18   Ht 5' 4\" (1.626 m)   Wt 305 lb 11.2 oz (138.7 kg)   SpO2 95%   BMI 52.47 kg/m²     HEENT: No oral or pharyngeal masses, ulceration or thrush noted, no sinus tenderness. Neck is supple with no thyromegaly or JVD noted. Lymph Nodes: No lymphadenopathy noted in the occipital, pre and post auricular, cervical, supra and infraclavicular, axillary, epitrochlear, inguinal, and popliteal region. Breasts: No palpable masses, nipple discharge or skin retraction  Lungs/Thorax: Clear to auscultation, no accessory muscles of respiration being used.   Heart: Regular rate and rhythm, normal S1, S2, no appreciable murmurs, rubs, gallops  Abdomen: Soft, nontender, bowel sounds present, no appreciable hepatosplenomegaly, no palpable masses  Extremeties: Good pulses bilaterally, no peripheral edema. Skin: Normal skin tone with no rash, petechiae, ecchymosis noted. Musculoskeletal: No pain on palpation over bony prominence, no edema, no evidence of gout, no joint or bony deformity  Neurologic: Grossly intact        LABS/IMAGING:    Lab Results   Component Value Date/Time    WBC 7.0 03/02/2022 10:10 AM    HGB 10.6 (L) 03/02/2022 10:10 AM    HCT 34.0 (L) 03/02/2022 10:10 AM    PLATELET 699 08/66/9671 10:10 AM    MCV 84.6 03/02/2022 10:10 AM       Lab Results   Component Value Date/Time    Sodium PENDING 03/02/2022 10:10 AM    Potassium PENDING 03/02/2022 10:10 AM    Chloride PENDING 03/02/2022 10:10 AM    CO2 PENDING 03/02/2022 10:10 AM    Anion gap PENDING 03/02/2022 10:10 AM    Glucose 91 03/02/2022 10:10 AM    BUN 10 03/02/2022 10:10 AM    Creatinine 0.80 03/02/2022 10:10 AM    BUN/Creatinine ratio 15 10/31/2019 11:30 AM    GFR est AA >60 03/02/2022 10:10 AM    GFR est non-AA >60 03/02/2022 10:10 AM    Calcium 8.8 03/02/2022 10:10 AM    Alk. phosphatase 76 03/02/2022 10:10 AM    Protein, total 8.0 03/02/2022 10:10 AM    Albumin 3.5 03/02/2022 10:10 AM    Globulin 4.5 (H) 03/02/2022 10:10 AM    A-G Ratio 0.8 (L) 03/02/2022 10:10 AM    ALT (SGPT) 14 03/02/2022 10:10 AM         Above results reviewed with patient. ASSESSMENT:  1. BARBER  2. B12 def  3. Folic acid def  4. Gastric sleeve 2005    6/15/2021: Seen for her initial office visit with me after graduating from [place] [address] program under Dr. Michelle akhtar. Reports doing well today. Denies any overt bleeding. Ongoing fatigue, as well as some numbness of her hands and feet. Recent bilateral mammogram and left sided ultrasound 4/21 with probable intramammary lymph node, and recommendation for quick interval left breast ultrasound in 6 months (10/21). She has essentially held off on most of her care while awaiting Medicaid approval.  Lab work today with out anemia however low iron stores:  For now she would prefer to hold off on IV iron till Medicaid kicks in and this is reasonable. She has yet to see GI in relation to this also. Also wants to see neurology down the line. 12/2/21: Since last visit, patient reports having established Medicaid now, and willing to move forward with her care. In process of establishing with primary care. Reports ongoing fatigue, as well as reported neuropathy of her hands and feet. Also notes occasional blood per rectum, and attributes this to hemorrhoids. Await blood work from today. We will plan for IV iron if low stores. She will also restart her B12 monthly subcu injections. She remains on folic acid daily. Will refer to GI and neurology. We will see her back in 3 months time. 3/2/2022: Since last visit has been following with GYN for menorrhagia, now on Provera p.o. Reports scheduled to see GI also next month for what she describes as hemorrhoidal bleeding. Blood work today with anemia with hemoglobin in 10 range, iron stores significantly low: Encouraged to proceed with IV iron x2. Monthly B12 injections subcu at home. She remains on folic acid daily. We will see her back in 3 months time. 9/28/2022: Reports being regular p.o. folic acid and monthly B12 injections (which she self administers at home). Regular with her GYN, notes ongoing menorrhagia. Has seen GI and underwent EGD/colonoscopy 8/3/2022 showing nonerosive esophagitis, and small internal hemorrhoids only. Fatigue somewhat worse of late. Blood work today without anemia, however iron stores following: Will repeat IV iron x2. We will see her back in 6 months time. PLAN:  - As above. IV iron prn. Regularly follows with GYN (has h/o menorrhagia, on OCP in past to regulate). Continue seeing GI as needed. - B12 def: po B12 causes jitteriness.  B12 SubQ monthly  - Folic acid: on po replacement   - Some neuropathy of hands and feet: planning to see neurology   - High globulin: SPEP/LC 2018 and 12/21 unremarkable. ESR/CRP high in past    RTC in 6 months w labs, iron panel, B12, folate, MMA, HC    Thank you Dr Chris Fuchs for allowing us to participate in care of this pleasant patient. Please call w any questions.       Kirstin Hodge MD  [address] [place] [address],[address]  Hematology Oncology  [address] [place] [address]  Office : [phone]  Fax : [phone]

## 2022-09-28 NOTE — PATIENT INSTRUCTIONS
Patient Instructions from Today's Visit    Reason for Visit:  Follow up    Plan:  Continue monthly b12 shots at home. You are not anemic today but your iron levels have dropped. We will set you up for two more iron infusions. Follow Up:   Follow up in 6 months     Recent Lab Results:  Hospital Outpatient Visit on 09/28/2022   Component Date Value Ref Range Status    WBC 09/28/2022 8.1  4.3 - 11.1 K/uL Final    RBC 09/28/2022 4.51  4.05 - 5.2 M/uL Final    Hemoglobin 09/28/2022 13.6  11.7 - 15.4 g/dL Final    Hematocrit 09/28/2022 42.7  35.8 - 46.3 % Final    MCV 09/28/2022 94.7  79.6 - 97.8 FL Final    MCH 09/28/2022 30.2  26.1 - 32.9 PG Final    MCHC 09/28/2022 31.9  31.4 - 35.0 g/dL Final    RDW 09/28/2022 13.9  11.9 - 14.6 % Final    Platelets 52/96/9261 248  150 - 450 K/uL Final    MPV 09/28/2022 10.6  9.4 - 12.3 FL Final    nRBC 09/28/2022 0.00  0.0 - 0.2 K/uL Final    **Note: Absolute NRBC parameter is now reported with Hemogram**    Seg Neutrophils 09/28/2022 60  43 - 78 % Final    Lymphocytes 09/28/2022 30  13 - 44 % Final    Monocytes 09/28/2022 8  4.0 - 12.0 % Final    Eosinophils % 09/28/2022 1  0.5 - 7.8 % Final    Basophils 09/28/2022 1  0.0 - 2.0 % Final    Immature Granulocytes 09/28/2022 0  0.0 - 5.0 % Final    Segs Absolute 09/28/2022 4.9  1.7 - 8.2 K/UL Final    Absolute Lymph # 09/28/2022 2.4  0.5 - 4.6 K/UL Final    Absolute Mono # 09/28/2022 0.6  0.1 - 1.3 K/UL Final    Absolute Eos # 09/28/2022 0.1  0.0 - 0.8 K/UL Final    Basophils Absolute 09/28/2022 0.0  0.0 - 0.2 K/UL Final    Absolute Immature Granulocyte 09/28/2022 0.0  0.0 - 0.5 K/UL Final    Differential Type 09/28/2022 AUTOMATED    Final    Sodium 09/28/2022 142  136 - 145 mmol/L Final    Potassium 09/28/2022 4.4  3.5 - 5.1 mmol/L Final    Chloride 09/28/2022 111 (A)  101 - 110 mmol/L Final    CO2 09/28/2022 28  21 - 32 mmol/L Final    Anion Gap 09/28/2022 3 (A)  4 - 13 mmol/L Final    RESULTS CHECKED X 2    Glucose 09/28/2022 anticoagulants (e.g. hemodialysis  patients) may cause spuriously elevated  iron results. \"      TIBC 09/28/2022 267  250 - 450 ug/dL Final    TRANSFERRIN SATURATION 09/28/2022 19 (A)  >20 % Final        Treatment Summary has been discussed and given to patient: n/a        -------------------------------------------------------------------------------------------------------------------  Please call our office at [phone] if you have any  of the following symptoms:   Fever of 100.5 or greater  Chills  Shortness of breath  Swelling or pain in one leg    After office hours an answering service is available and will contact a provider for emergencies or if you are experiencing any of the above symptoms. Patient did express an interest in My Chart. My Chart log in information explained on the after visit summary printout at the [facility] [address] desk.     Marlon Jordan RN

## 2022-10-03 ENCOUNTER — OFFICE VISIT (OUTPATIENT)
Dept: ORTHOPEDIC SURGERY | Age: 42
End: 2022-10-03
Payer: COMMERCIAL

## 2022-10-03 ENCOUNTER — CLINICAL DOCUMENTATION (OUTPATIENT)
Dept: ORTHOPEDIC SURGERY | Age: 42
End: 2022-10-03

## 2022-10-03 DIAGNOSIS — M54.16 LUMBAR RADICULOPATHY: Primary | ICD-10-CM

## 2022-10-03 PROCEDURE — 99214 OFFICE O/P EST MOD 30 MIN: CPT | Performed by: PHYSICAL MEDICINE & REHABILITATION

## 2022-10-03 NOTE — PROGRESS NOTES
Date:  10/03/22     HPI:  I am seeing Colleen Rogerio in evalution/folowup. She has had problems for a bout a year. Unclear why. Pain in the lower lumbar areas and buttocks. More on the left. Dull and sharp. Worse with standing, walking, bending, twisting. Better with lying or reclining. The pain tends to gravitate mostly posteriorly and laterally to her left foot. She has lesser symptoms on the right and thinks this might have come on from favoring the left leg. She has very degrees of weakness numbness and tingling in the left leg. She has been evaluated by Yudith Benavides and MR imaging reveals moderate neuroforaminal narrowing at the L5-S1 level. She has tried ibuprofen. She is on duloxetine, pregabalin and Celexa for her fibromyalgia. She underwent formal physical therapy for 2 months in January and February of this year and did not get much benefit. She continues her lumbar stretches and exercises on a daily basis. She has had her most recent translaminar lumbar RONALD in late July for which she had an 80% pain reduction for over 2 months. The pain is come back in a similar fashion. Her pain scale is 8/10. She has trouble bathing cleaning dressing. Trouble getting into out of a car. She has failed conservative measures, as above mentioned in the note. Is currently felt that spine surgery could be an option if she does not respond to further minimally invasive measures. He has discussed this with Yudith Benavides.         Past Medical History:   Diagnosis Date    Anemia     Anxiety     medication    COVID-19 2021    Fibromyalgia     managed with medication    GERD (gastroesophageal reflux disease) 3/25/2014    H/O seasonal allergies     Hypothyroidism 3/25/2014    managed with medication    Migraine 3/25/2014    managed with medication    Morbid obesity ([facility] [address] 3/25/2014    Oligomenorrhea 3/25/2014    Ovarian cyst 3/25/2014    PCOS (polycystic ovarian syndrome)     managed with metformin Trichomonosis     Vitamin D deficiency     managed with medication        Past Surgical History:   Procedure Laterality Date    COLONOSCOPY N/A 8/3/2022    COLORECTAL CANCER SCREENING, NOT HIGH RISK performed by Gini Primrose, MD at [facility] ENDOSCOPY    GASTRECTOMY  7/29/15    sleeve    UPPER GASTROINTESTINAL ENDOSCOPY N/A 8/3/2022    EGD ESOPHAGOGASTRODUODENOSCOPY performed by Gini Primrose, MD at [facility] ENDOSCOPY        Family History   Problem Relation Age of Onset    Diabetes Father     Hypertension Father     Hypertension Mother     COPD Mother     Cancer Other         unknown aunt (melanoma)    Other Paternal Grandmother         thalassemia    Cancer Paternal Grandfather         prostate    Coronary Art Dis Other         unknown family member    Diabetes Paternal Grandmother         Social History     Socioeconomic History    Marital status: Single     Spouse name: Not on file    Number of children: Not on file    Years of education: Not on file    Highest education level: Not on file   Occupational History    Not on file   Tobacco Use    Smoking status: Every Day     Packs/day: 0.50     Types: Cigarettes     Last attempt to quit: 2008     Years since quittin.6    Smokeless tobacco: Never   Vaping Use    Vaping Use: Never used   Substance and Sexual Activity    Alcohol use: No    Drug use: No    Sexual activity: Not on file   Other Topics Concern    Not on file   Social History Narrative    Works in radiology scheduling.  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     Social Determinants of Health     Financial Resource Strain: Not on file   Food Insecurity: Not on file   Transportation Needs: Not on file   Physical Activity: Not on file   Stress: Not on file   Social Connections: Not on file   Intimate Partner Violence: Not on file   Housing Stability: Not on file        Review of Systems       Current Outpatient Medications   Medication Sig Dispense Refill    pregabalin (LYRICA) 150 MG capsule TAKE 1 CAPSULE BY MOUTH EVERY 8 HOURS AS NEEDED FOR PAIN MAX DAILY AMOUNT  MG USE SPARINGLY 90 capsule 0    Cholecalciferol (VITAMIN D3 PO) Take by mouth every evening      Multiple Vitamin (MULTIVITAMIN ADULT PO) Take by mouth daily      Syringe, Disposable, 1 ML MISC To be used with monthly vitamin b12 shots 1 each 5    citalopram (CELEXA) 40 MG tablet TAKE 1 TABLET BY MOUTH ONCE DAILY      DULoxetine (CYMBALTA) 60 MG extended release capsule TAKE 1 CAPSULE BY MOUTH ONCE DAILY      folic acid (FOLVITE) 1 MG tablet TAKE 1 TABLET BY MOUTH ONCE DAILY      EUTHYROX 88 MCG tablet TAKE 1 TABLET BY MOUTH ONCE DAILY BEFORE BREAKFAST      cyanocobalamin 1000 MCG/ML injection Administer every 28 days. 1 mL 5     No current facility-administered medications for this visit. No Known Allergies      PHYSICAL EXAM    General: Alert and cooperative    HEENT: Clear speech    Motor Exam: Good strength    Sensory Exam: No lateralizing findings    Deep Tendon Reflexes: Decreased reflexes in LE's    Cerebellar Exam: No ataxia in the Ue's overtly    Extremities: Deferred    Gait / Station: Ambulates without assistance    Lumbar Spine: Has diffuse tenderness in lower lumbar paraspinal areas and buttocks      IMPRESSION/PLAN:   Predominantly Musculoskeletal Lumbosacral Spine Pain. Bilateral lower extremity radiculopathy, mainly left L5-S1 distribution. Neuroforaminal narrowing to explain it. I can offer repeat translaminar lumbar RONALD at first availability once we have insurance approval.  The plan is for me to offer injections or what ever I can to help her, with reconsideration of spine surgical options if these efforts do not provide meaningful benefit. Do not need to reimage. Nothing I can offer from a medicinal standpoint. Continue her lumbar stretches and exercises.     Xochitl Kuhn MD

## 2022-10-10 ENCOUNTER — HOSPITAL ENCOUNTER (OUTPATIENT)
Dept: INFUSION THERAPY | Age: 42
Discharge: HOME | End: 2022-10-10
Payer: COMMERCIAL

## 2022-10-10 VITALS
RESPIRATION RATE: 18 BRPM | HEART RATE: 81 BPM | TEMPERATURE: 98.3 F | SYSTOLIC BLOOD PRESSURE: 142 MMHG | DIASTOLIC BLOOD PRESSURE: 92 MMHG | OXYGEN SATURATION: 99 %

## 2022-10-10 DIAGNOSIS — D50.9 IRON DEFICIENCY ANEMIA, UNSPECIFIED IRON DEFICIENCY ANEMIA TYPE: Primary | ICD-10-CM

## 2022-10-10 PROCEDURE — 6360000002 HC RX W HCPCS: Performed by: INTERNAL MEDICINE

## 2022-10-10 PROCEDURE — 96365 THER/PROPH/DIAG IV INF INIT: CPT

## 2022-10-10 PROCEDURE — 2580000003 HC RX 258: Performed by: INTERNAL MEDICINE

## 2022-10-10 RX ORDER — SODIUM CHLORIDE 9 MG/ML
INJECTION, SOLUTION INTRAVENOUS CONTINUOUS
Status: CANCELLED | OUTPATIENT
Start: 2022-10-17

## 2022-10-10 RX ORDER — EPINEPHRINE 1 MG/ML
0.3 INJECTION, SOLUTION, CONCENTRATE INTRAVENOUS PRN
Status: CANCELLED | OUTPATIENT
Start: 2022-10-17

## 2022-10-10 RX ORDER — SODIUM CHLORIDE 0.9 % (FLUSH) 0.9 %
5-40 SYRINGE (ML) INJECTION PRN
Status: DISCONTINUED | OUTPATIENT
Start: 2022-10-10 | End: 2022-10-11 | Stop reason: HOSPADM

## 2022-10-10 RX ORDER — SODIUM CHLORIDE 9 MG/ML
5-250 INJECTION, SOLUTION INTRAVENOUS PRN
Status: CANCELLED | OUTPATIENT
Start: 2022-10-17

## 2022-10-10 RX ORDER — HEPARIN SODIUM (PORCINE) LOCK FLUSH IV SOLN 100 UNIT/ML 100 UNIT/ML
500 SOLUTION INTRAVENOUS PRN
Status: CANCELLED | OUTPATIENT
Start: 2022-10-17

## 2022-10-10 RX ORDER — ONDANSETRON 2 MG/ML
8 INJECTION INTRAMUSCULAR; INTRAVENOUS
Status: CANCELLED | OUTPATIENT
Start: 2022-10-17

## 2022-10-10 RX ORDER — DIPHENHYDRAMINE HYDROCHLORIDE 50 MG/ML
50 INJECTION INTRAMUSCULAR; INTRAVENOUS
Status: CANCELLED | OUTPATIENT
Start: 2022-10-17

## 2022-10-10 RX ORDER — ACETAMINOPHEN 325 MG/1
650 TABLET ORAL
Status: CANCELLED | OUTPATIENT
Start: 2022-10-17

## 2022-10-10 RX ORDER — SODIUM CHLORIDE 9 MG/ML
5-250 INJECTION, SOLUTION INTRAVENOUS PRN
Status: DISCONTINUED | OUTPATIENT
Start: 2022-10-10 | End: 2022-10-11 | Stop reason: HOSPADM

## 2022-10-10 RX ORDER — SODIUM CHLORIDE 0.9 % (FLUSH) 0.9 %
5-40 SYRINGE (ML) INJECTION PRN
Status: CANCELLED | OUTPATIENT
Start: 2022-10-17

## 2022-10-10 RX ORDER — ALBUTEROL SULFATE 90 UG/1
4 AEROSOL, METERED RESPIRATORY (INHALATION) PRN
Status: CANCELLED | OUTPATIENT
Start: 2022-10-17

## 2022-10-10 RX ADMIN — SODIUM CHLORIDE 100 ML/HR: 9 INJECTION, SOLUTION INTRAVENOUS at 08:42

## 2022-10-10 RX ADMIN — SODIUM CHLORIDE, PRESERVATIVE FREE 10 ML: 5 INJECTION INTRAVENOUS at 08:41

## 2022-10-10 RX ADMIN — FERRIC CARBOXYMALTOSE INJECTION 750 MG: 50 INJECTION, SOLUTION INTRAVENOUS at 08:54

## 2022-10-10 ASSESSMENT — PAIN DESCRIPTION - FREQUENCY: FREQUENCY: CONTINUOUS

## 2022-10-10 ASSESSMENT — PAIN SCALES - GENERAL: PAINLEVEL_OUTOF10: 10

## 2022-10-10 ASSESSMENT — PAIN DESCRIPTION - LOCATION: LOCATION: GENERALIZED

## 2022-10-10 ASSESSMENT — PAIN DESCRIPTION - ONSET: ONSET: ON-GOING

## 2022-10-10 ASSESSMENT — PAIN DESCRIPTION - PAIN TYPE: TYPE: CHRONIC PAIN

## 2022-10-10 NOTE — PROGRESS NOTES
Arrived to the [facility]. Injectafer completed. Patient tolerated well. Any issues or concerns during appointment: none. Patient aware of next infusion appointment on 10/18/22 (date) at 0800 (time). Patient aware of next lab and [facility] office visit on 3/28/23 (date) at 0900 (time). Patient instructed to call provider with temperature of 100.4 or greater or nausea/vomiting/ diarrhea or pain not controlled by medications  Discharged ambulatory.

## 2022-10-18 ENCOUNTER — HOSPITAL ENCOUNTER (OUTPATIENT)
Dept: INFUSION THERAPY | Age: 42
Discharge: HOME | End: 2022-10-18
Payer: COMMERCIAL

## 2022-10-18 VITALS
DIASTOLIC BLOOD PRESSURE: 90 MMHG | SYSTOLIC BLOOD PRESSURE: 134 MMHG | RESPIRATION RATE: 18 BRPM | TEMPERATURE: 97.9 F | OXYGEN SATURATION: 98 % | HEART RATE: 71 BPM

## 2022-10-18 DIAGNOSIS — D50.9 IRON DEFICIENCY ANEMIA, UNSPECIFIED IRON DEFICIENCY ANEMIA TYPE: Primary | ICD-10-CM

## 2022-10-18 PROCEDURE — 96365 THER/PROPH/DIAG IV INF INIT: CPT

## 2022-10-18 PROCEDURE — 6360000002 HC RX W HCPCS: Performed by: INTERNAL MEDICINE

## 2022-10-18 PROCEDURE — 2580000003 HC RX 258: Performed by: INTERNAL MEDICINE

## 2022-10-18 RX ORDER — SODIUM CHLORIDE 0.9 % (FLUSH) 0.9 %
5-40 SYRINGE (ML) INJECTION PRN
OUTPATIENT
Start: 2022-10-24

## 2022-10-18 RX ORDER — SODIUM CHLORIDE 0.9 % (FLUSH) 0.9 %
5-40 SYRINGE (ML) INJECTION PRN
Status: DISCONTINUED | OUTPATIENT
Start: 2022-10-18 | End: 2022-10-19 | Stop reason: HOSPADM

## 2022-10-18 RX ORDER — SODIUM CHLORIDE 9 MG/ML
5-250 INJECTION, SOLUTION INTRAVENOUS PRN
Status: CANCELLED | OUTPATIENT
Start: 2022-10-24

## 2022-10-18 RX ORDER — EPINEPHRINE 1 MG/ML
0.3 INJECTION, SOLUTION, CONCENTRATE INTRAVENOUS PRN
OUTPATIENT
Start: 2022-10-24

## 2022-10-18 RX ORDER — SODIUM CHLORIDE 9 MG/ML
5-250 INJECTION, SOLUTION INTRAVENOUS PRN
OUTPATIENT
Start: 2022-10-24

## 2022-10-18 RX ORDER — HEPARIN SODIUM (PORCINE) LOCK FLUSH IV SOLN 100 UNIT/ML 100 UNIT/ML
500 SOLUTION INTRAVENOUS PRN
OUTPATIENT
Start: 2022-10-24

## 2022-10-18 RX ORDER — ONDANSETRON 2 MG/ML
8 INJECTION INTRAMUSCULAR; INTRAVENOUS
OUTPATIENT
Start: 2022-10-24

## 2022-10-18 RX ORDER — ALBUTEROL SULFATE 90 UG/1
4 AEROSOL, METERED RESPIRATORY (INHALATION) PRN
OUTPATIENT
Start: 2022-10-24

## 2022-10-18 RX ORDER — SODIUM CHLORIDE 9 MG/ML
INJECTION, SOLUTION INTRAVENOUS CONTINUOUS
OUTPATIENT
Start: 2022-10-24

## 2022-10-18 RX ORDER — SODIUM CHLORIDE 9 MG/ML
5-250 INJECTION, SOLUTION INTRAVENOUS PRN
Status: DISCONTINUED | OUTPATIENT
Start: 2022-10-18 | End: 2022-10-19 | Stop reason: HOSPADM

## 2022-10-18 RX ORDER — ACETAMINOPHEN 325 MG/1
650 TABLET ORAL
OUTPATIENT
Start: 2022-10-24

## 2022-10-18 RX ORDER — DIPHENHYDRAMINE HYDROCHLORIDE 50 MG/ML
50 INJECTION INTRAMUSCULAR; INTRAVENOUS
OUTPATIENT
Start: 2022-10-24

## 2022-10-18 RX ADMIN — SODIUM CHLORIDE, PRESERVATIVE FREE 10 ML: 5 INJECTION INTRAVENOUS at 09:21

## 2022-10-18 RX ADMIN — FERRIC CARBOXYMALTOSE INJECTION 750 MG: 50 INJECTION, SOLUTION INTRAVENOUS at 08:33

## 2022-10-18 RX ADMIN — SODIUM CHLORIDE 250 ML/HR: 9 INJECTION, SOLUTION INTRAVENOUS at 08:55

## 2022-10-18 ASSESSMENT — PAIN DESCRIPTION - LOCATION: LOCATION: GENERALIZED

## 2022-10-18 ASSESSMENT — PAIN DESCRIPTION - FREQUENCY: FREQUENCY: CONTINUOUS

## 2022-10-18 ASSESSMENT — PAIN DESCRIPTION - PAIN TYPE: TYPE: CHRONIC PAIN

## 2022-10-18 ASSESSMENT — PAIN SCALES - GENERAL: PAINLEVEL_OUTOF10: 8

## 2022-10-18 ASSESSMENT — PAIN DESCRIPTION - ONSET: ONSET: ON-GOING

## 2022-10-18 NOTE — PROGRESS NOTES
Pt arrived ambulatory, injectafer completed, pt tolerated well, monitored for 30 min, no reactions, discharged home ambulatory

## 2022-10-22 DIAGNOSIS — M79.7 FIBROMYALGIA: ICD-10-CM

## 2022-10-24 DIAGNOSIS — M79.7 FIBROMYALGIA: ICD-10-CM

## 2022-10-24 RX ORDER — PREGABALIN 150 MG/1
CAPSULE ORAL
Qty: 90 CAPSULE | Refills: 0 | OUTPATIENT
Start: 2022-10-24 | End: 2022-11-23

## 2022-10-24 RX ORDER — PREGABALIN 150 MG/1
CAPSULE ORAL
Qty: 90 CAPSULE | Refills: 0 | OUTPATIENT
Start: 2022-10-24 | End: 2022-11-21

## 2022-10-24 RX ORDER — PREGABALIN 150 MG/1
CAPSULE ORAL
Qty: 90 CAPSULE | Refills: 0 | Status: SHIPPED | OUTPATIENT
Start: 2022-10-24 | End: 2022-10-24 | Stop reason: ALTCHOICE

## 2022-10-24 RX ORDER — PREGABALIN 150 MG/1
CAPSULE ORAL
Qty: 90 CAPSULE | Refills: 0 | OUTPATIENT
Start: 2022-10-24

## 2022-10-24 NOTE — TELEPHONE ENCOUNTER
I informed the pt that she needed {Blank Multiple Select  Template:26027::\"***\",\"admission\",\"further observation\",\"further workup\"}   for adequate evaluation for her {Blank Multiple Select Template:72296::\"***\",\"chest pain\",\"abdominal pain\",\"DKA\"}, and that by refusing the above, she is at risk for {Blank Multiple Select Template:42330::\"***\",\"myocardial infarction\",\"arrhythmia\",\"sudden death\",\"paralysis\",\"further deterioration\",\"coma\"}. She is awake, alert, and she understands her condition and the risks involved in leaving. The patient has received *** (list any potential altering drugs patient had receivedanalgesics, benzos, sedatives) and it has been {Blank Single Select Template:55579::\"15 UTCBGKW\",\"39-33 minutes\",\"30 minutes to 1 hour\",\"1-2 hours\",\">2 hours\"} since last receiving this medication. She is clinically aware of her surroundings and able to ask appropriate questions, the patients {Blank Multiple Select Template:27557::\"***\",\"parent\",\"grandparent\",\"sibling\",\"child\",\"significant other\",\"caretaker\",\"friend\",\"neighbor\"} and the nurse present confirms she is not clinically intoxicated and able to make medical decisions. She verbalized knowing the risks and understood it was recommended that she stay and could also return at any time. her {Blank Multiple Select Template:67450::\"***\",\"parent\",\"grandparent\",\"sibling\",\"child\",\"significant other\",\"caretaker\",\"friend\",\"neighbor\"} was present for the discussion and also verbalized that they understood both diagnosis, risks and could return at any time. They were both provided with warnings regarding worsening of her condition and were provided instructions to follow up with Winnie Aguilar MD tomorrow or return to the Emergency Room as soon as possible.  This discussion was witnessed by nurse ***./

## 2022-10-24 NOTE — TELEPHONE ENCOUNTER
Refill needed for pregabalin (LYRICA) 150 MG capsule sent to [facility] [address] [facility]. Patient requested multiple refills or larger supply. Informed patient that we don't provide more than one month supply on controlled substances. She requested a call back.

## 2022-10-25 ENCOUNTER — OFFICE VISIT (OUTPATIENT)
Dept: ORTHOPEDIC SURGERY | Age: 42
End: 2022-10-25
Payer: COMMERCIAL

## 2022-10-25 VITALS — BODY MASS INDEX: 48.14 KG/M2 | WEIGHT: 282 LBS | HEIGHT: 64 IN

## 2022-10-25 DIAGNOSIS — M54.16 LUMBAR RADICULOPATHY: Primary | ICD-10-CM

## 2022-10-25 PROCEDURE — 62323 NJX INTERLAMINAR LMBR/SAC: CPT | Performed by: PHYSICAL MEDICINE & REHABILITATION

## 2022-10-25 RX ORDER — TRIAMCINOLONE ACETONIDE 40 MG/ML
100 INJECTION, SUSPENSION INTRA-ARTICULAR; INTRAMUSCULAR ONCE
Status: COMPLETED | OUTPATIENT
Start: 2022-10-25 | End: 2022-10-25

## 2022-10-25 RX ADMIN — TRIAMCINOLONE ACETONIDE 100 MG: 40 INJECTION, SUSPENSION INTRA-ARTICULAR; INTRAMUSCULAR at 09:18

## 2022-10-25 NOTE — PROGRESS NOTES
Date: 10/25/22   Name: Hadley Moncada    Pre-Procedural Diagnosis:    Diagnosis Orders   1. Lumbar radiculopathy  XR INJ SPINE THER SUBST LUM/SAC W IMG    triamcinolone acetonide (KENALOG-40) injection 100 mg          Procedure: Lumbar Epidural Steroid Injection (RONALD)    Precautions: [facility] Precautions spine injections: None. Patient denies any prior sensitivity to steroid, local anesthetic, contrast dye, iodine or shellfish. The procedure was discussed at length with the patient and informed consent was signed. The patient was placed in a prone position on the fluoroscopy table and the skin was prepped and draped in a routine sterile fashion. The area to be injected was anesthetized with approximately 5 cc of 1% Lidocaine. Initially a 22-gauge 5 inch inch spinal needle was carefully advanced under fluoroscopic guidance to the left L5-S1 epidural space. At this time 0.25 cc of omnipaque administered. . Once proper placement was confirmed, 1.5 cc of sterile water and 100 mg of Kenalog were injected through the spinal needle. Fluoroscopic guidance was used intermittently over a 10-minute period to insure proper needle placement and patient safety. A hard copy of the fluoroscopic  images has been placed in the patient's chart. The patient was monitored after the procedure and discharged home without complication.      Resume Meds:  N/A    Vernon Kay MD  10/25/22

## 2022-10-26 RX ORDER — PREGABALIN 150 MG/1
CAPSULE ORAL
Qty: 90 CAPSULE | Refills: 0 | OUTPATIENT
Start: 2022-10-26 | End: 2023-10-05

## 2022-11-25 ENCOUNTER — TELEPHONE (OUTPATIENT)
Dept: INTERNAL MEDICINE CLINIC | Facility: CLINIC | Age: 42
End: 2022-11-25

## 2022-11-25 DIAGNOSIS — M79.7 FIBROMYALGIA: Primary | ICD-10-CM

## 2022-11-25 RX ORDER — PREGABALIN 150 MG/1
150 CAPSULE ORAL 3 TIMES DAILY
Qty: 90 CAPSULE | Refills: 0 | Status: SHIPPED | OUTPATIENT
Start: 2022-11-25 | End: 2022-12-25

## 2022-11-25 NOTE — TELEPHONE ENCOUNTER
-Pregabalin 150 MG - none left, pt stated she called Wednesday to request med refill    -[facility] [phone]     -if we cannot fill this due to medication type/provider out please call pt back

## 2022-11-29 ENCOUNTER — TELEPHONE (OUTPATIENT)
Dept: INTERNAL MEDICINE CLINIC | Facility: CLINIC | Age: 42
End: 2022-11-29

## 2022-11-29 DIAGNOSIS — M79.7 FIBROMYALGIA: ICD-10-CM

## 2022-11-29 RX ORDER — PREGABALIN 150 MG/1
150 CAPSULE ORAL 3 TIMES DAILY
Qty: 90 CAPSULE | Refills: 0 | Status: CANCELLED | OUTPATIENT
Start: 2022-11-29 | End: 2022-12-29

## 2022-11-29 NOTE — TELEPHONE ENCOUNTER
----- Message from Terry Mauricio sent at 11/23/2022  9:38 AM EST -----  Subject: Message to Provider    QUESTIONS  Information for Provider? pt is needing PREGABALIN refilled sent to   [facility]   ---------------------------------------------------------------------------  --------------  [address] [employer]  [phone]; OK to leave message on voicemail  ---------------------------------------------------------------------------  --------------  SCRIPT ANSWERS  Relationship to Patient?  Self

## 2022-11-30 RX ORDER — FOLIC ACID 1 MG/1
TABLET ORAL
Qty: 30 TABLET | Refills: 11 | Status: SHIPPED | OUTPATIENT
Start: 2022-11-30

## 2022-11-30 RX ORDER — PREGABALIN 150 MG/1
CAPSULE ORAL
Qty: 90 CAPSULE | Refills: 0 | OUTPATIENT
Start: 2022-11-30

## 2022-12-26 DIAGNOSIS — M79.7 FIBROMYALGIA: ICD-10-CM

## 2022-12-26 RX ORDER — PREGABALIN 150 MG/1
150 CAPSULE ORAL 3 TIMES DAILY
Qty: 90 CAPSULE | Refills: 0 | Status: CANCELLED | OUTPATIENT
Start: 2022-12-26 | End: 2023-01-25

## 2022-12-27 ENCOUNTER — TELEPHONE (OUTPATIENT)
Dept: ORTHOPEDIC SURGERY | Age: 42
End: 2022-12-27

## 2022-12-27 DIAGNOSIS — M54.16 LUMBAR RADICULOPATHY: Primary | ICD-10-CM

## 2022-12-27 NOTE — LETTER
[address],[facility] [address]  Phone: [phone]  Fax: [phone]    Erma Macias MD        January 3, 2023         Name: Kristie Rincon  YOB: 1980  Gender: female  MRN: [id number]        This patient has requested repeat RONALD. ????.     The most recent injection provided 80% pain reduction and improvement in functioning for at least 2 months.     The patient's current pain scale is 8/10.????? .     As a result of the current recurrence of pain, the patient is having the following difficulties:  Difficulties with bathing and/or dressing  Difficulty sleeping at night  Difficulty transferring into or out of a car  Difficulty performing housework  Difficulty with sitting or performing work related activitiesDifficulty with sitting or performing work related activities     The patient has severe pain limiting function despite on-going, conservative, physician-guided treatment.     The patient is currently engaging in lumbar spine exercises.     DAMIAN Chahal MD, MD

## 2023-01-03 ENCOUNTER — CLINICAL DOCUMENTATION (OUTPATIENT)
Dept: ORTHOPEDIC SURGERY | Age: 43
End: 2023-01-03

## 2023-01-03 NOTE — TELEPHONE ENCOUNTER
eric    Name: Mihaela Ronquillo  YOB: 1980  Gender: female  MRN: [id number]      This patient has requested repeat RONALD. Juanita Cook The most recent injection provided 80% pain reduction and improvement in functioning for at least 2 months. The patient's current pain scale is 8/10. .    As a result of the current recurrence of pain, the patient is having the following difficulties:  Difficulties with bathing and/or dressing  Difficulty sleeping at night  Difficulty transferring into or out of a car  Difficulty performing housework  Difficulty with sitting or performing work related activitiesDifficulty with sitting or performing work related activities    The patient has severe pain limiting function despite on-going, conservative, physician-guided treatment. The patient is currently engaging in lumbar spine exercises.     Jarrod Pendleton MD, MD

## 2023-01-06 RX ORDER — DULOXETIN HYDROCHLORIDE 60 MG/1
CAPSULE, DELAYED RELEASE ORAL
Qty: 30 CAPSULE | Refills: 0 | Status: SHIPPED | OUTPATIENT
Start: 2023-01-06

## 2023-01-06 NOTE — TELEPHONE ENCOUNTER
-DULoxetine (CYMBALTA) 60 MG extended release capsule    -[facility] on [address]    Apt on 1/12    Pt is out of medication

## 2023-01-12 ENCOUNTER — OFFICE VISIT (OUTPATIENT)
Dept: INTERNAL MEDICINE CLINIC | Facility: CLINIC | Age: 43
End: 2023-01-12

## 2023-01-12 VITALS
OXYGEN SATURATION: 97 % | WEIGHT: 266.4 LBS | SYSTOLIC BLOOD PRESSURE: 138 MMHG | DIASTOLIC BLOOD PRESSURE: 82 MMHG | BODY MASS INDEX: 45.73 KG/M2 | HEART RATE: 98 BPM

## 2023-01-12 DIAGNOSIS — E03.9 HYPOTHYROIDISM, UNSPECIFIED TYPE: Primary | ICD-10-CM

## 2023-01-12 DIAGNOSIS — Z11.4 ENCOUNTER FOR SCREENING FOR HIV: ICD-10-CM

## 2023-01-12 DIAGNOSIS — M79.7 FIBROMYALGIA: ICD-10-CM

## 2023-01-12 DIAGNOSIS — E03.9 HYPOTHYROIDISM, UNSPECIFIED TYPE: ICD-10-CM

## 2023-01-12 LAB
HIV 1+2 AB+HIV1 P24 AG SERPL QL IA: NONREACTIVE
HIV 1/2 RESULT COMMENT: NORMAL
TSH, 3RD GENERATION: 0.95 UIU/ML (ref 0.36–3.74)

## 2023-01-12 RX ORDER — CYANOCOBALAMIN 1000 UG/ML
INJECTION, SOLUTION INTRAMUSCULAR; SUBCUTANEOUS
Qty: 1 ML | Refills: 5 | Status: SHIPPED | OUTPATIENT
Start: 2023-01-12

## 2023-01-12 RX ORDER — PREGABALIN 150 MG/1
150 CAPSULE ORAL 3 TIMES DAILY
Qty: 90 CAPSULE | Refills: 0 | Status: SHIPPED | OUTPATIENT
Start: 2023-01-12 | End: 2023-02-11

## 2023-01-12 RX ORDER — VALACYCLOVIR HYDROCHLORIDE 1 G/1
TABLET, FILM COATED ORAL
COMMUNITY
Start: 2021-08-18

## 2023-01-12 RX ORDER — DULOXETIN HYDROCHLORIDE 60 MG/1
CAPSULE, DELAYED RELEASE ORAL
Qty: 90 CAPSULE | Refills: 3 | Status: SHIPPED | OUTPATIENT
Start: 2023-01-12

## 2023-01-12 ASSESSMENT — PATIENT HEALTH QUESTIONNAIRE - PHQ9
SUM OF ALL RESPONSES TO PHQ9 QUESTIONS 1 & 2: 1
2. FEELING DOWN, DEPRESSED OR HOPELESS: 1
SUM OF ALL RESPONSES TO PHQ QUESTIONS 1-9: 1
1. LITTLE INTEREST OR PLEASURE IN DOING THINGS: 0
SUM OF ALL RESPONSES TO PHQ QUESTIONS 1-9: 1

## 2023-01-12 ASSESSMENT — ENCOUNTER SYMPTOMS
SHORTNESS OF BREATH: 0
ABDOMINAL PAIN: 0
COUGH: 0

## 2023-01-12 NOTE — PROGRESS NOTES
Annual Prevention visit    I have reviewed the patient's medical history in detail and updated the computerized patient record.      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 Garland Hammer     Past Medical History:   Diagnosis Date    Anemia     Anxiety     medication    COVID-19 2021    Fibromyalgia     managed with medication    GERD (gastroesophageal reflux disease) 3/25/2014    H/O seasonal allergies     Hypothyroidism 3/25/2014    managed with medication    Migraine 3/25/2014    managed with medication    Morbid obesity ([address] 3/25/2014    Oligomenorrhea 3/25/2014    Ovarian cyst 3/25/2014    PCOS (polycystic ovarian syndrome)     managed with metformin    Trichomonosis     Vitamin D deficiency     managed with medication      Past Surgical History:   Procedure Laterality Date    COLONOSCOPY N/A 8/3/2022    COLORECTAL CANCER SCREENING, NOT HIGH RISK performed by Shannen Mejia MD at [address] [place] [address]  7/29/15    sleeve    UPPER GASTROINTESTINAL ENDOSCOPY N/A 8/3/2022    EGD ESOPHAGOGASTRODUODENOSCOPY performed by Shannen Mejia MD at [facility] ENDOSCOPY     Current Outpatient Medications   Medication Sig Dispense Refill    valACYclovir (VALTREX) 1 g tablet 2 tabs at onset, then 2 tabs 12 hours later, for a total of 2 doses, NEVER TAKE WITH TIZANIDINE/ZANAFLEX      cyanocobalamin 1000 MCG/ML injection Administer every 28 days. 1 mL 5    DULoxetine (CYMBALTA) 60 MG extended release capsule TAKE 1 CAPSULE BY MOUTH ONCE DAILY 90 capsule 3    pregabalin (LYRICA) 150 MG capsule Take 1 capsule by mouth 3 times daily for 30 days. 90 capsule 0    Syringe, Disposable, 1 ML MISC To be used with monthly vitamin b12 shots 1 each 5    folic acid (FOLVITE) 1 MG tablet TAKE 1 TABLET BY MOUTH ONCE DAILY 30 tablet 11    Cholecalciferol (VITAMIN D3 PO) Take by mouth every evening      Multiple Vitamin (MULTIVITAMIN ADULT PO) Take by mouth daily      citalopram (CELEXA) 40 MG tablet TAKE 1 TABLET BY MOUTH ONCE DAILY      EUTHYROX 88 MCG tablet TAKE 1 TABLET BY MOUTH ONCE DAILY BEFORE BREAKFAST       No current facility-administered medications for this visit.      No Known Allergies  Family History   Problem Relation Age of Onset    Diabetes Father     Hypertension Father     Hypertension Mother     COPD Mother     Cancer Other         unknown aunt (melanoma)    Other Paternal Grandmother         thalassemia    Cancer Paternal Grandfather         prostate    Coronary Art Dis Other         unknown family member    Diabetes Paternal Grandmother      Social History     Tobacco Use    Smoking status: Every Day     Packs/day: 0.50     Types: Cigarettes     Last attempt to quit: 2008     Years since quittin.9    Smokeless tobacco: Never   Substance Use Topics    Alcohol use: No     Patient Active Problem List   Diagnosis    Hypothyroidism    H/O gastric bypass    GERD (gastroesophageal reflux disease)    Fatigue    Fibromyalgia    Migraine    Anxiety disorder    Low serum vitamin X94    Folic acid deficiency    Oligomenorrhea    BARBER (iron deficiency anemia)    Morbid obesity (HCC)    Recurrent herpes labialis     Health Maintenance     Health Maintenance   Topic Date Due    COVID-19 Vaccine (1) Never done    Varicella vaccine (1 of 2 - 2-dose childhood series) Never done    Pneumococcal 0-64 years Vaccine (1 - PCV) Never done    HIV screen  Never done    DTaP/Tdap/Td vaccine (1 - Tdap) Never done    Flu vaccine (1) 08/01/2022    Depression Screen  09/28/2023    Cervical cancer screen  02/23/2027    Lipids  05/03/2027    Hepatitis C screen  Completed    Hepatitis A vaccine  Aged Out    Hib vaccine  Aged Out    Meningococcal (ACWY) vaccine  Aged [employer] History   Administered Date(s) Administered    Influenza, High Dose (Fluzone 65 yrs and older) 10/01/2016     Review of Systems   Review of Systems   Constitutional:  Negative for fever. Respiratory:  Negative for cough and shortness of breath. Cardiovascular:  Negative for chest pain and leg swelling. Gastrointestinal:  Negative for abdominal pain. Psychiatric/Behavioral:  Negative for behavioral problems and confusion. Physical Examination   /82 (Site: Left Upper Arm, Position: Sitting, Cuff Size: Large Adult)   Pulse 98   Wt 266 lb 6.4 oz (120.8 kg)   SpO2 97%   BMI 45.73 kg/m²         Physical Exam  Vitals and nursing note reviewed. Constitutional:       General: She is not in acute distress. Cardiovascular:      Rate and Rhythm: Normal rate and regular rhythm. Pulmonary:      Effort: Pulmonary effort is normal.      Breath sounds: No wheezing. Neurological:      General: No focal deficit present. Mental Status: She is oriented to person, place, and time. Psychiatric:         Mood and Affect: Mood normal.         Behavior: Behavior normal.       Assessment/Plan     Adam Philip was seen today for annual exam.    Diagnoses and all orders for this visit:    Hypothyroidism, unspecified type  -     TSH; Future    Fibromyalgia  -     pregabalin (LYRICA) 150 MG capsule; Take 1 capsule by mouth 3 times daily for 30 days. Encounter for screening for HIV  -     HIV 1/2 Ag/Ab, 4TH Generation,W Rflx Confirm; Future    Other orders  -     cyanocobalamin 1000 MCG/ML injection; Administer every 28 days.   -     DULoxetine (CYMBALTA) 60 MG extended release capsule; TAKE 1 CAPSULE BY MOUTH ONCE DAILY  -     Syringe, Disposable, 1 ML MISC; To be used with monthly vitamin b12 shots  . Reviewed Health maintenance and referral needed as below   Education and counseling including referral provided as needed for:  Cardiovascular disease prevention - including cardiovascular risk assessment; HTN, Hyperlipidemia, Diabetes, Physical activity, Obesity, Aspirin for Primary prevention, routine EKG and blood tests  Cancer Prevention - including screening for Cervical cancer(pap smears), Breast Cancer(mammograms), Ovarian cancer, Colorectal cancer(colonoscopy), Lung cancer and Melanoma(sun protection)  Other screening - Iron deficiency, hypothyroidism, Vitamin D deficiency  Immunizations including - Influenza Vaccine, Tdap, Varicella, HPV, Zoster, Pneumococcal, Meningococcal and Hepatitis B  STD and Blood Borne infections - including Chlamydia, Gonnorhea, Hepatitis B, Hepatitis C, HIV and Syphilis  Psychosocial Health conditions - including, Anxiety, Depression, substance related conditions(Alcohol, Tobacco/Other Drug) and intimate partner violence. Osteoporosis prevention including weight bearing exercise, Vitamin D/calcium supplementation anf Bone mass measurement (DEXA) if indicated.   Medication compliance  Smoking avoidance

## 2023-01-17 ENCOUNTER — CLINICAL DOCUMENTATION (OUTPATIENT)
Dept: ORTHOPEDIC SURGERY | Age: 43
End: 2023-01-17

## 2023-01-17 NOTE — PROGRESS NOTES
PATIENT LEFT MESSAGE ON MY VOICE MAIL ASKING ABOUT INJECTION, 01/03 I NOTIFIED MONALISA PEARSON TO SCHEDULE I WILL SEND ANOTHER MESSAGE

## 2023-01-20 ENCOUNTER — TELEPHONE (OUTPATIENT)
Dept: ORTHOPEDIC SURGERY | Age: 43
End: 2023-01-20

## 2023-01-20 NOTE — TELEPHONE ENCOUNTER
Please call this pt  about  getting an injection  she  says  she  spoke  with  someone  a few weeks ago and  verified  her insurance and has not  heard back about approval  or  getting it

## 2023-01-20 NOTE — TELEPHONE ENCOUNTER
The injection has been approved. I made several attempts to reach patient to the number on file. Unable to reach patient.

## 2023-02-01 ENCOUNTER — OFFICE VISIT (OUTPATIENT)
Dept: ORTHOPEDIC SURGERY | Age: 43
End: 2023-02-01
Payer: COMMERCIAL

## 2023-02-01 DIAGNOSIS — M54.16 LUMBAR RADICULOPATHY: Primary | ICD-10-CM

## 2023-02-01 PROCEDURE — 99999 PR OFFICE/OUTPT VISIT,PROCEDURE ONLY: CPT | Performed by: PHYSICAL MEDICINE & REHABILITATION

## 2023-02-01 PROCEDURE — 62323 NJX INTERLAMINAR LMBR/SAC: CPT | Performed by: PHYSICAL MEDICINE & REHABILITATION

## 2023-02-01 RX ORDER — TRIAMCINOLONE ACETONIDE 40 MG/ML
100 INJECTION, SUSPENSION INTRA-ARTICULAR; INTRAMUSCULAR ONCE
Status: COMPLETED | OUTPATIENT
Start: 2023-02-01 | End: 2023-02-01

## 2023-02-01 RX ADMIN — TRIAMCINOLONE ACETONIDE 100 MG: 40 INJECTION, SUSPENSION INTRA-ARTICULAR; INTRAMUSCULAR at 08:59

## 2023-02-01 NOTE — PROGRESS NOTES
Date: 02/01/23   Name: Raman Lopez    Pre-Procedural Diagnosis:    Diagnosis Orders   1. Lumbar radiculopathy  XR INJ SPINE THER SUBST LUM/SAC W IMG          Procedure: Lumbar Epidural Steroid Injection (RONALD)    Precautions: [facility] Precautions spine injections: None. Patient denies any prior sensitivity to steroid, local anesthetic, contrast dye, iodine or shellfish. The procedure was discussed at length with the patient and informed consent was signed. The patient was placed in a prone position on the fluoroscopy table and the skin was prepped and draped in a routine sterile fashion. The area to be injected was anesthetized with approximately 5 cc of 1% Lidocaine. Initially a 22-gauge 5 inch inch spinal needle was carefully advanced under fluoroscopic guidance to the right L5-S1 epidural space. At this time 0.25 cc of omnipaque administered. . Once proper placement was confirmed, 1.5 cc of sterile water and 100 mg of Kenalog were injected through the spinal needle. Fluoroscopic guidance was used intermittently over a 10-minute period to insure proper needle placement and patient safety. A hard copy of the fluoroscopic  images has been placed in the patient's chart. The patient was monitored after the procedure and discharged home without complication.      Resume Meds:  N/A    Amanda Pedroza MD  02/01/23

## 2023-02-03 RX ORDER — LEVOTHYROXINE SODIUM 88 UG/1
TABLET ORAL
Qty: 90 TABLET | Refills: 0 | Status: SHIPPED | OUTPATIENT
Start: 2023-02-03

## 2023-02-09 NOTE — PROGRESS NOTES
b12 sign. Low despite recommending oral b12 and prior referral to GI, but pt.  Non compliant; may be a source of tingling, neuropathy  -pls recommend parenteral b12 1000mcg weekly x 4 then monthly until seen by Neri Muller and b12 recheck in 6 months  -rerefer to GI  Thank you 71y/o F with COVID+, VC paresis and granulomas    - Case d/w Dr. 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

## 2023-02-12 ENCOUNTER — PATIENT MESSAGE (OUTPATIENT)
Dept: INTERNAL MEDICINE CLINIC | Facility: CLINIC | Age: 43
End: 2023-02-12

## 2023-02-12 DIAGNOSIS — M79.7 FIBROMYALGIA: ICD-10-CM

## 2023-02-13 RX ORDER — CITALOPRAM 40 MG/1
TABLET ORAL
Qty: 90 TABLET | Refills: 0 | Status: SHIPPED | OUTPATIENT
Start: 2023-02-13

## 2023-02-13 RX ORDER — PREGABALIN 150 MG/1
150 CAPSULE ORAL 3 TIMES DAILY
Qty: 90 CAPSULE | Refills: 0 | Status: SHIPPED | OUTPATIENT
Start: 2023-02-13 | End: 2023-03-15

## 2023-02-13 RX ORDER — CYANOCOBALAMIN 1000 UG/ML
INJECTION, SOLUTION INTRAMUSCULAR; SUBCUTANEOUS
Qty: 1 ML | Refills: 5 | Status: SHIPPED | OUTPATIENT
Start: 2023-02-13

## 2023-02-13 NOTE — TELEPHONE ENCOUNTER
From: Bennett Fail  To: Dr. Brody Evangelista: 2/12/2023 2:01 PM EST  Subject: Citalopram    Please send refill to [address] [facility] on file.   Thank you

## 2023-03-12 DIAGNOSIS — M79.7 FIBROMYALGIA: ICD-10-CM

## 2023-03-13 RX ORDER — PREGABALIN 150 MG/1
CAPSULE ORAL
Qty: 90 CAPSULE | Refills: 0 | Status: SHIPPED | OUTPATIENT
Start: 2023-03-13 | End: 2023-04-12

## 2023-03-14 ENCOUNTER — TELEPHONE (OUTPATIENT)
Dept: ORTHOPEDIC SURGERY | Age: 43
End: 2023-03-14

## 2023-03-14 RX ORDER — PREGABALIN 150 MG/1
150 CAPSULE ORAL 3 TIMES DAILY
Qty: 90 CAPSULE | Refills: 0 | OUTPATIENT
Start: 2023-03-14 | End: 2023-04-13

## 2023-03-14 NOTE — TELEPHONE ENCOUNTER
Pt called wants to make sure the medicaid approval for [facility] please inform pt

## 2023-03-27 DIAGNOSIS — D50.9 IRON DEFICIENCY ANEMIA, UNSPECIFIED IRON DEFICIENCY ANEMIA TYPE: Primary | ICD-10-CM

## 2023-04-14 DIAGNOSIS — M79.7 FIBROMYALGIA: ICD-10-CM

## 2023-04-18 RX ORDER — PREGABALIN 150 MG/1
150 CAPSULE ORAL 3 TIMES DAILY
Qty: 90 CAPSULE | Refills: 0 | Status: SHIPPED | OUTPATIENT
Start: 2023-04-18 | End: 2023-05-18

## 2023-05-03 ENCOUNTER — HOSPITAL ENCOUNTER (OUTPATIENT)
Dept: LAB | Age: 43
Discharge: HOME | End: 2023-05-06
Payer: COMMERCIAL

## 2023-05-03 ENCOUNTER — OFFICE VISIT (OUTPATIENT)
Dept: ONCOLOGY | Age: 43
End: 2023-05-03
Payer: COMMERCIAL

## 2023-05-03 VITALS
TEMPERATURE: 98.1 F | RESPIRATION RATE: 16 BRPM | SYSTOLIC BLOOD PRESSURE: 107 MMHG | WEIGHT: 277 LBS | HEART RATE: 82 BPM | OXYGEN SATURATION: 98 % | DIASTOLIC BLOOD PRESSURE: 73 MMHG | BODY MASS INDEX: 47.55 KG/M2

## 2023-05-03 DIAGNOSIS — E53.8 FOLIC ACID DEFICIENCY: ICD-10-CM

## 2023-05-03 DIAGNOSIS — D50.9 IRON DEFICIENCY ANEMIA, UNSPECIFIED IRON DEFICIENCY ANEMIA TYPE: Primary | ICD-10-CM

## 2023-05-03 DIAGNOSIS — E53.8 VITAMIN B12 DEFICIENCY: ICD-10-CM

## 2023-05-03 DIAGNOSIS — D50.9 IRON DEFICIENCY ANEMIA, UNSPECIFIED IRON DEFICIENCY ANEMIA TYPE: ICD-10-CM

## 2023-05-03 DIAGNOSIS — R53.83 FATIGUE, UNSPECIFIED TYPE: ICD-10-CM

## 2023-05-03 LAB
ALBUMIN SERPL-MCNC: 2.9 G/DL (ref 3.5–5)
ALBUMIN/GLOB SERPL: 0.9 (ref 0.4–1.6)
ALP SERPL-CCNC: 52 U/L (ref 50–136)
ALT SERPL-CCNC: 18 U/L (ref 12–65)
ANION GAP SERPL CALC-SCNC: 5 MMOL/L (ref 2–11)
AST SERPL-CCNC: 12 U/L (ref 15–37)
BASOPHILS # BLD: 0 K/UL (ref 0–0.2)
BASOPHILS NFR BLD: 0 % (ref 0–2)
BILIRUB SERPL-MCNC: 0.5 MG/DL (ref 0.2–1.1)
BUN SERPL-MCNC: 9 MG/DL (ref 6–23)
CALCIUM SERPL-MCNC: 8.5 MG/DL (ref 8.3–10.4)
CHLORIDE SERPL-SCNC: 114 MMOL/L (ref 101–110)
CO2 SERPL-SCNC: 27 MMOL/L (ref 21–32)
CREAT SERPL-MCNC: 0.9 MG/DL (ref 0.6–1)
DIFFERENTIAL METHOD BLD: NORMAL
EOSINOPHIL # BLD: 0.1 K/UL (ref 0–0.8)
EOSINOPHIL NFR BLD: 1 % (ref 0.5–7.8)
ERYTHROCYTE [DISTWIDTH] IN BLOOD BY AUTOMATED COUNT: 14.4 % (ref 11.9–14.6)
FERRITIN SERPL-MCNC: 203 NG/ML (ref 8–388)
GLOBULIN SER CALC-MCNC: 3.1 G/DL (ref 2.8–4.5)
GLUCOSE SERPL-MCNC: 90 MG/DL (ref 65–100)
HCT VFR BLD AUTO: 41.3 % (ref 35.8–46.3)
HGB BLD-MCNC: 13.3 G/DL (ref 11.7–15.4)
HGB RETIC QN AUTO: 33 PG (ref 29–35)
IMM GRANULOCYTES # BLD AUTO: 0 K/UL (ref 0–0.5)
IMM GRANULOCYTES NFR BLD AUTO: 0 % (ref 0–5)
IMM RETICS NFR: 9.1 % (ref 3–15.9)
IRON SATN MFR SERPL: 14 %
IRON SERPL-MCNC: 34 UG/DL (ref 35–150)
LYMPHOCYTES # BLD: 1.5 K/UL (ref 0.5–4.6)
LYMPHOCYTES NFR BLD: 22 % (ref 13–44)
MCH RBC QN AUTO: 30 PG (ref 26.1–32.9)
MCHC RBC AUTO-ENTMCNC: 32.2 G/DL (ref 31.4–35)
MCV RBC AUTO: 93.2 FL (ref 82–102)
MONOCYTES # BLD: 0.4 K/UL (ref 0.1–1.3)
MONOCYTES NFR BLD: 6 % (ref 4–12)
NEUTS SEG # BLD: 4.7 K/UL (ref 1.7–8.2)
NEUTS SEG NFR BLD: 71 % (ref 43–78)
NRBC # BLD: 0 K/UL (ref 0–0.2)
PLATELET # BLD AUTO: 163 K/UL (ref 150–450)
PMV BLD AUTO: 11.7 FL (ref 9.4–12.3)
POTASSIUM SERPL-SCNC: 4.3 MMOL/L (ref 3.5–5.1)
PROT SERPL-MCNC: 6 G/DL (ref 6.3–8.2)
RBC # BLD AUTO: 4.43 M/UL (ref 4.05–5.2)
RETICS # AUTO: 0.09 M/UL (ref 0.03–0.1)
RETICS/RBC NFR AUTO: 1.9 % (ref 0.3–2)
SODIUM SERPL-SCNC: 146 MMOL/L (ref 133–143)
TIBC SERPL-MCNC: 250 UG/DL (ref 250–450)
WBC # BLD AUTO: 6.8 K/UL (ref 4.3–11.1)

## 2023-05-03 PROCEDURE — 83550 IRON BINDING TEST: CPT

## 2023-05-03 PROCEDURE — 85046 RETICYTE/HGB CONCENTRATE: CPT

## 2023-05-03 PROCEDURE — 80053 COMPREHEN METABOLIC PANEL: CPT

## 2023-05-03 PROCEDURE — 82728 ASSAY OF FERRITIN: CPT

## 2023-05-03 PROCEDURE — 36415 COLL VENOUS BLD VENIPUNCTURE: CPT

## 2023-05-03 PROCEDURE — 99214 OFFICE O/P EST MOD 30 MIN: CPT | Performed by: NURSE PRACTITIONER

## 2023-05-03 PROCEDURE — 85025 COMPLETE CBC W/AUTO DIFF WBC: CPT

## 2023-05-03 PROCEDURE — 83540 ASSAY OF IRON: CPT

## 2023-05-03 RX ORDER — SODIUM CHLORIDE 0.9 % (FLUSH) 0.9 %
5-40 SYRINGE (ML) INJECTION PRN
OUTPATIENT
Start: 2023-05-03

## 2023-05-03 RX ORDER — DIPHENHYDRAMINE HYDROCHLORIDE 50 MG/ML
50 INJECTION INTRAMUSCULAR; INTRAVENOUS
OUTPATIENT
Start: 2023-05-03

## 2023-05-03 RX ORDER — ACETAMINOPHEN 325 MG/1
650 TABLET ORAL
OUTPATIENT
Start: 2023-05-03

## 2023-05-03 RX ORDER — SODIUM CHLORIDE 9 MG/ML
5-250 INJECTION, SOLUTION INTRAVENOUS PRN
OUTPATIENT
Start: 2023-05-03

## 2023-05-03 RX ORDER — HEPARIN SODIUM (PORCINE) LOCK FLUSH IV SOLN 100 UNIT/ML 100 UNIT/ML
500 SOLUTION INTRAVENOUS PRN
OUTPATIENT
Start: 2023-05-03

## 2023-05-03 RX ORDER — SODIUM CHLORIDE 9 MG/ML
INJECTION, SOLUTION INTRAVENOUS CONTINUOUS
OUTPATIENT
Start: 2023-05-03

## 2023-05-03 RX ORDER — EPINEPHRINE 1 MG/ML
0.3 INJECTION, SOLUTION, CONCENTRATE INTRAVENOUS PRN
OUTPATIENT
Start: 2023-05-03

## 2023-05-03 RX ORDER — ONDANSETRON 2 MG/ML
8 INJECTION INTRAMUSCULAR; INTRAVENOUS
OUTPATIENT
Start: 2023-05-03

## 2023-05-03 RX ORDER — ALBUTEROL SULFATE 90 UG/1
4 AEROSOL, METERED RESPIRATORY (INHALATION) PRN
OUTPATIENT
Start: 2023-05-03

## 2023-05-03 RX ORDER — FAMOTIDINE 10 MG/ML
20 INJECTION, SOLUTION INTRAVENOUS
OUTPATIENT
Start: 2023-05-03

## 2023-05-03 ASSESSMENT — PATIENT HEALTH QUESTIONNAIRE - PHQ9
5. POOR APPETITE OR OVEREATING: 0
SUM OF ALL RESPONSES TO PHQ9 QUESTIONS 1 & 2: 0
4. FEELING TIRED OR HAVING LITTLE ENERGY: 3
SUM OF ALL RESPONSES TO PHQ QUESTIONS 1-9: 5
6. FEELING BAD ABOUT YOURSELF - OR THAT YOU ARE A FAILURE OR HAVE LET YOURSELF OR YOUR FAMILY DOWN: 0
SUM OF ALL RESPONSES TO PHQ QUESTIONS 1-9: 5
3. TROUBLE FALLING OR STAYING ASLEEP: 2
2. FEELING DOWN, DEPRESSED OR HOPELESS: 0
SUM OF ALL RESPONSES TO PHQ QUESTIONS 1-9: 5
SUM OF ALL RESPONSES TO PHQ QUESTIONS 1-9: 5
1. LITTLE INTEREST OR PLEASURE IN DOING THINGS: 0
7. TROUBLE CONCENTRATING ON THINGS, SUCH AS READING THE NEWSPAPER OR WATCHING TELEVISION: 0
8. MOVING OR SPEAKING SO SLOWLY THAT OTHER PEOPLE COULD HAVE NOTICED. OR THE OPPOSITE, BEING SO FIGETY OR RESTLESS THAT YOU HAVE BEEN MOVING AROUND A LOT MORE THAN USUAL: 0
9. THOUGHTS THAT YOU WOULD BE BETTER OFF DEAD, OR OF HURTING YOURSELF: 0

## 2023-05-03 ASSESSMENT — ANXIETY QUESTIONNAIRES
3. WORRYING TOO MUCH ABOUT DIFFERENT THINGS: 0
5. BEING SO RESTLESS THAT IT IS HARD TO SIT STILL: 0
4. TROUBLE RELAXING: 0
IF YOU CHECKED OFF ANY PROBLEMS ON THIS QUESTIONNAIRE, HOW DIFFICULT HAVE THESE PROBLEMS MADE IT FOR YOU TO DO YOUR WORK, TAKE CARE OF THINGS AT HOME, OR GET ALONG WITH OTHER PEOPLE: NOT DIFFICULT AT ALL
7. FEELING AFRAID AS IF SOMETHING AWFUL MIGHT HAPPEN: 0
2. NOT BEING ABLE TO STOP OR CONTROL WORRYING: 0
1. FEELING NERVOUS, ANXIOUS, OR ON EDGE: 0
6. BECOMING EASILY ANNOYED OR IRRITABLE: 0
GAD7 TOTAL SCORE: 0

## 2023-05-03 NOTE — PROGRESS NOTES
Data Source: Patient, [facility] record. 05/03/23     Emilio Watkins [id number]    37 y.o. Patient Encounter: [facility] Visit    Heme Diagnosis:  BARBER  Heme History (Copied from prior):   Cindy Lawton is a 45 y.o. female h/o migraines, chronic fatigue, fibromyalgia, neuropathy, being referred for anemia. Ms. Minnie Wright presented to Dr. Bravo Iqbal on 11/6/2018  to establish care. Lab work completed in July by previous primary care was evaluated and revealed her hemoglobin was 9.4. According to [address] she was unable to be reached regarding her lab work. There is mention of complaints of fatigue at that time. She was diagnosed with fibromyalgia about 6 years ago, however has not seen a rheumatologist or neurologist. Has had multiple courses of PT including the fibromyalgia program. Has done water PT. Saw Dr. Layne Stinson who gave her multiple injections with no benefit. NCS apparently showed neuropathy. She is on Lyrica and Cymbalta. .   States that her periods are sometimes heavy. Has been on iron supplements in the past but they cause constipation. Most recent hgb was 9.4.      ASSESSMENT:  Mixed nutritional deficiency with symptomatic iron deficiency and malabsorption  1) IV iron-se fully addressed  -iron stores normal, hgb stable  -repeat in 6 months     12/17/19 injectafer x 2; follow up 1 year     2) Oral folic acid 1mg and N58 1000mcg and repeat in 3mths  -non compliance, continue oral meds and rerefer to GI for scope     3) refer to GI  Will need further eval if does not respond to po as gastric sleeve explains BARBER but not folic acid and low normal b12  Further anemia work up pending-all work up negative  No improvement in fatigue, b12 low so start b12  RTC in 12 months     11/12/20  Not taking B12  Did not go to GI  States her tingling/m-s symptoms are worse  Sent here early b/o abnormal alb, globulin but normal TP and has had 3+ years of mild low alb and

## 2023-05-17 ENCOUNTER — HOSPITAL ENCOUNTER (OUTPATIENT)
Dept: INFUSION THERAPY | Age: 43
Discharge: HOME | End: 2023-05-17
Payer: COMMERCIAL

## 2023-05-17 VITALS
OXYGEN SATURATION: 92 % | SYSTOLIC BLOOD PRESSURE: 118 MMHG | TEMPERATURE: 98.8 F | HEART RATE: 98 BPM | RESPIRATION RATE: 18 BRPM | DIASTOLIC BLOOD PRESSURE: 84 MMHG

## 2023-05-17 DIAGNOSIS — D50.9 IRON DEFICIENCY ANEMIA, UNSPECIFIED IRON DEFICIENCY ANEMIA TYPE: Primary | ICD-10-CM

## 2023-05-17 PROCEDURE — 6360000002 HC RX W HCPCS: Performed by: NURSE PRACTITIONER

## 2023-05-17 PROCEDURE — 2580000003 HC RX 258: Performed by: NURSE PRACTITIONER

## 2023-05-17 PROCEDURE — 96365 THER/PROPH/DIAG IV INF INIT: CPT

## 2023-05-17 RX ORDER — ALBUTEROL SULFATE 90 UG/1
4 AEROSOL, METERED RESPIRATORY (INHALATION) PRN
Status: DISCONTINUED | OUTPATIENT
Start: 2023-05-17 | End: 2023-05-18 | Stop reason: HOSPADM

## 2023-05-17 RX ORDER — ACETAMINOPHEN 325 MG/1
650 TABLET ORAL
Status: CANCELLED | OUTPATIENT
Start: 2023-05-24

## 2023-05-17 RX ORDER — DIPHENHYDRAMINE HYDROCHLORIDE 50 MG/ML
50 INJECTION INTRAMUSCULAR; INTRAVENOUS
Status: DISCONTINUED | OUTPATIENT
Start: 2023-05-17 | End: 2023-05-18 | Stop reason: HOSPADM

## 2023-05-17 RX ORDER — EPINEPHRINE 1 MG/ML
0.3 INJECTION, SOLUTION, CONCENTRATE INTRAVENOUS PRN
Status: CANCELLED | OUTPATIENT
Start: 2023-05-24

## 2023-05-17 RX ORDER — SODIUM CHLORIDE 9 MG/ML
INJECTION, SOLUTION INTRAVENOUS CONTINUOUS
Status: DISCONTINUED | OUTPATIENT
Start: 2023-05-17 | End: 2023-05-18 | Stop reason: HOSPADM

## 2023-05-17 RX ORDER — ACETAMINOPHEN 325 MG/1
650 TABLET ORAL
Status: DISCONTINUED | OUTPATIENT
Start: 2023-05-17 | End: 2023-05-18 | Stop reason: HOSPADM

## 2023-05-17 RX ORDER — EPINEPHRINE 1 MG/ML
0.3 INJECTION, SOLUTION, CONCENTRATE INTRAVENOUS PRN
Status: DISCONTINUED | OUTPATIENT
Start: 2023-05-17 | End: 2023-05-18 | Stop reason: HOSPADM

## 2023-05-17 RX ORDER — ONDANSETRON 2 MG/ML
8 INJECTION INTRAMUSCULAR; INTRAVENOUS
Status: DISCONTINUED | OUTPATIENT
Start: 2023-05-17 | End: 2023-05-18 | Stop reason: HOSPADM

## 2023-05-17 RX ORDER — ALBUTEROL SULFATE 90 UG/1
4 AEROSOL, METERED RESPIRATORY (INHALATION) PRN
Status: CANCELLED | OUTPATIENT
Start: 2023-05-24

## 2023-05-17 RX ORDER — SODIUM CHLORIDE 9 MG/ML
5-250 INJECTION, SOLUTION INTRAVENOUS PRN
Status: CANCELLED | OUTPATIENT
Start: 2023-05-24

## 2023-05-17 RX ORDER — SODIUM CHLORIDE 0.9 % (FLUSH) 0.9 %
5-40 SYRINGE (ML) INJECTION PRN
Status: CANCELLED | OUTPATIENT
Start: 2023-05-24

## 2023-05-17 RX ORDER — ONDANSETRON 2 MG/ML
8 INJECTION INTRAMUSCULAR; INTRAVENOUS
Status: CANCELLED | OUTPATIENT
Start: 2023-05-24

## 2023-05-17 RX ORDER — SODIUM CHLORIDE 9 MG/ML
5-250 INJECTION, SOLUTION INTRAVENOUS PRN
Status: DISCONTINUED | OUTPATIENT
Start: 2023-05-17 | End: 2023-05-18 | Stop reason: HOSPADM

## 2023-05-17 RX ORDER — SODIUM CHLORIDE 9 MG/ML
INJECTION, SOLUTION INTRAVENOUS CONTINUOUS
Status: CANCELLED | OUTPATIENT
Start: 2023-05-24

## 2023-05-17 RX ORDER — HEPARIN SODIUM (PORCINE) LOCK FLUSH IV SOLN 100 UNIT/ML 100 UNIT/ML
500 SOLUTION INTRAVENOUS PRN
Status: CANCELLED | OUTPATIENT
Start: 2023-05-24

## 2023-05-17 RX ORDER — DIPHENHYDRAMINE HYDROCHLORIDE 50 MG/ML
50 INJECTION INTRAMUSCULAR; INTRAVENOUS
Status: CANCELLED | OUTPATIENT
Start: 2023-05-24

## 2023-05-17 RX ORDER — SODIUM CHLORIDE 0.9 % (FLUSH) 0.9 %
5-40 SYRINGE (ML) INJECTION PRN
Status: DISCONTINUED | OUTPATIENT
Start: 2023-05-17 | End: 2023-05-18 | Stop reason: HOSPADM

## 2023-05-17 RX ADMIN — SODIUM CHLORIDE, PRESERVATIVE FREE 10 ML: 5 INJECTION INTRAVENOUS at 15:00

## 2023-05-17 RX ADMIN — SODIUM CHLORIDE 75 ML/HR: 9 INJECTION, SOLUTION INTRAVENOUS at 14:08

## 2023-05-17 RX ADMIN — FERUMOXYTOL 510 MG: 510 INJECTION INTRAVENOUS at 14:09

## 2023-05-17 ASSESSMENT — PAIN SCALES - GENERAL: PAINLEVEL_OUTOF10: 0

## 2023-05-17 NOTE — PROGRESS NOTES
Arrived to the [facility]. Airam completed. Patient tolerated well. Any issues or concerns during appointment: none. Patient instructed to call provider with temperature of 100.4 or greater or nausea/vomiting/ diarrhea or pain not controlled by medications. Discharged ambulatory.

## 2023-05-19 DIAGNOSIS — M79.7 FIBROMYALGIA: ICD-10-CM

## 2023-05-22 ENCOUNTER — OFFICE VISIT (OUTPATIENT)
Dept: ORTHOPEDIC SURGERY | Age: 43
End: 2023-05-22
Payer: COMMERCIAL

## 2023-05-22 ENCOUNTER — TELEPHONE (OUTPATIENT)
Dept: INTERNAL MEDICINE CLINIC | Facility: CLINIC | Age: 43
End: 2023-05-22

## 2023-05-22 DIAGNOSIS — M54.16 LUMBAR RADICULOPATHY: Primary | ICD-10-CM

## 2023-05-22 DIAGNOSIS — M79.7 FIBROMYALGIA: ICD-10-CM

## 2023-05-22 PROCEDURE — 99213 OFFICE O/P EST LOW 20 MIN: CPT | Performed by: PHYSICAL MEDICINE & REHABILITATION

## 2023-05-22 RX ORDER — PREGABALIN 150 MG/1
CAPSULE ORAL
Qty: 90 CAPSULE | Refills: 0 | Status: SHIPPED | OUTPATIENT
Start: 2023-05-22 | End: 2023-08-20

## 2023-05-22 NOTE — TELEPHONE ENCOUNTER
The patient is needing a pre-authorization for Pregabalin. She is completely out of it. She needs a 30 days supply. Please send the prescription to [facility] at [address] [place] [address] after being approved by her insurance company.

## 2023-05-22 NOTE — PROGRESS NOTES
Date:  05/22/23     HPI:  I am seeing Yeni Lucero in evalution/folowup. I last saw her in the office setting in October. She has had problems for about 2 years, lower lumbar spine and buttock pain, left greater than right. Worse with activities and better with rest.  MR imaging reveals moderate neural foraminal narrowing at the L5-S1 level and it has been felt that spine surgery could be an option if she does not respond to minimally invasive measures. She has had translaminar lumbar RONALD's which she has an 80% pain reduction for 3 months. These were performed 3-1/2 months ago and the pain has come back in a similar fashion. Her pain scale is 8/10. She continues lumbar spine stretches and exercises on a daily basis. She has been doing these for greater than a year. She has trouble bathing, cleaning, dressing, getting into or out of a car. ROS: As above. She has fibromyalgia for which she is on duloxetine, pregabalin and Celexa    PE: Alert and cooperative. She presents with a cane. Has reasonably good strength in lower extremities. No lateralizing sensory findings. She has tenderness in the lower lumbar paraspinal areas and buttocks. Good range of motion of the hips    Assessment/Plan:  PREDOMINANTLY MUSCULOSKELETAL LUMBOSACRAL  SPINE PAIN. Favorable responses to translaminar epidural steroid injections. Repeat at first availability once we have insurance approval.  We do not need to reimage. She will continue her other medications for her fibromyalgia.     Karen Morrell MD

## 2023-05-23 RX ORDER — PREGABALIN 150 MG/1
150 CAPSULE ORAL 3 TIMES DAILY
Qty: 90 CAPSULE | Refills: 0 | OUTPATIENT
Start: 2023-05-23 | End: 2023-06-22

## 2023-05-24 ENCOUNTER — HOSPITAL ENCOUNTER (OUTPATIENT)
Dept: INFUSION THERAPY | Age: 43
Discharge: HOME | End: 2023-05-24
Payer: COMMERCIAL

## 2023-05-24 VITALS
TEMPERATURE: 98.4 F | HEART RATE: 66 BPM | DIASTOLIC BLOOD PRESSURE: 86 MMHG | SYSTOLIC BLOOD PRESSURE: 133 MMHG | OXYGEN SATURATION: 97 % | RESPIRATION RATE: 18 BRPM

## 2023-05-24 DIAGNOSIS — D50.9 IRON DEFICIENCY ANEMIA, UNSPECIFIED IRON DEFICIENCY ANEMIA TYPE: Primary | ICD-10-CM

## 2023-05-24 PROCEDURE — 6360000002 HC RX W HCPCS: Performed by: NURSE PRACTITIONER

## 2023-05-24 PROCEDURE — 96365 THER/PROPH/DIAG IV INF INIT: CPT

## 2023-05-24 PROCEDURE — 2580000003 HC RX 258: Performed by: NURSE PRACTITIONER

## 2023-05-24 RX ORDER — ONDANSETRON 2 MG/ML
8 INJECTION INTRAMUSCULAR; INTRAVENOUS
OUTPATIENT
Start: 2023-05-24

## 2023-05-24 RX ORDER — SODIUM CHLORIDE 0.9 % (FLUSH) 0.9 %
5-40 SYRINGE (ML) INJECTION PRN
Status: DISCONTINUED | OUTPATIENT
Start: 2023-05-24 | End: 2023-05-25 | Stop reason: HOSPADM

## 2023-05-24 RX ORDER — ACETAMINOPHEN 325 MG/1
650 TABLET ORAL
OUTPATIENT
Start: 2023-05-24

## 2023-05-24 RX ORDER — DIPHENHYDRAMINE HYDROCHLORIDE 50 MG/ML
50 INJECTION INTRAMUSCULAR; INTRAVENOUS
OUTPATIENT
Start: 2023-05-24

## 2023-05-24 RX ORDER — SODIUM CHLORIDE 9 MG/ML
5-250 INJECTION, SOLUTION INTRAVENOUS PRN
OUTPATIENT
Start: 2023-05-24

## 2023-05-24 RX ORDER — ALBUTEROL SULFATE 90 UG/1
4 AEROSOL, METERED RESPIRATORY (INHALATION) PRN
OUTPATIENT
Start: 2023-05-24

## 2023-05-24 RX ORDER — HEPARIN SODIUM (PORCINE) LOCK FLUSH IV SOLN 100 UNIT/ML 100 UNIT/ML
500 SOLUTION INTRAVENOUS PRN
OUTPATIENT
Start: 2023-05-24

## 2023-05-24 RX ORDER — SODIUM CHLORIDE 9 MG/ML
5-250 INJECTION, SOLUTION INTRAVENOUS PRN
Status: CANCELLED | OUTPATIENT
Start: 2023-05-24

## 2023-05-24 RX ORDER — SODIUM CHLORIDE 9 MG/ML
5-250 INJECTION, SOLUTION INTRAVENOUS PRN
Status: DISCONTINUED | OUTPATIENT
Start: 2023-05-24 | End: 2023-05-25 | Stop reason: HOSPADM

## 2023-05-24 RX ORDER — SODIUM CHLORIDE 9 MG/ML
INJECTION, SOLUTION INTRAVENOUS CONTINUOUS
OUTPATIENT
Start: 2023-05-24

## 2023-05-24 RX ORDER — EPINEPHRINE 1 MG/ML
0.3 INJECTION, SOLUTION, CONCENTRATE INTRAVENOUS PRN
OUTPATIENT
Start: 2023-05-24

## 2023-05-24 RX ORDER — SODIUM CHLORIDE 0.9 % (FLUSH) 0.9 %
5-40 SYRINGE (ML) INJECTION PRN
OUTPATIENT
Start: 2023-05-24

## 2023-05-24 RX ADMIN — FERUMOXYTOL 510 MG: 510 INJECTION INTRAVENOUS at 12:20

## 2023-05-24 RX ADMIN — SODIUM CHLORIDE, PRESERVATIVE FREE 10 ML: 5 INJECTION INTRAVENOUS at 12:00

## 2023-05-24 NOTE — PROGRESS NOTES
Arrived to the [facility]. Feraheme infusion completed. Patient tolerated well. Any issues or concerns during appointment: no  Patient aware of next lab and [facility] office visit on 11/13/2023 (date) at 200 (time). Patient instructed to call provider with temperature of 100.4 or greater or nausea/vomiting/ diarrhea or pain not controlled by medications  Discharged ambulatory.

## 2023-06-09 ENCOUNTER — OFFICE VISIT (OUTPATIENT)
Dept: ORTHOPEDIC SURGERY | Age: 43
End: 2023-06-09

## 2023-06-09 DIAGNOSIS — M54.16 LUMBAR RADICULOPATHY: Primary | ICD-10-CM

## 2023-06-09 DIAGNOSIS — E03.9 HYPOTHYROIDISM, UNSPECIFIED TYPE: ICD-10-CM

## 2023-06-09 LAB — TSH, 3RD GENERATION: 1.35 UIU/ML (ref 0.36–3.74)

## 2023-06-09 RX ORDER — TRIAMCINOLONE ACETONIDE 40 MG/ML
100 INJECTION, SUSPENSION INTRA-ARTICULAR; INTRAMUSCULAR ONCE
Status: COMPLETED | OUTPATIENT
Start: 2023-06-09 | End: 2023-06-09

## 2023-06-09 RX ADMIN — TRIAMCINOLONE ACETONIDE 100 MG: 40 INJECTION, SUSPENSION INTRA-ARTICULAR; INTRAMUSCULAR at 10:09

## 2023-06-09 NOTE — PROGRESS NOTES
Date: 06/09/23   Name: Ibis Mcdonald    Pre-Procedural Diagnosis:    Diagnosis Orders   1. Lumbar radiculopathy  XR INJ SPINE THER SUBST LUM/SAC W IMG    triamcinolone acetonide (KENALOG-40) injection 100 mg          Procedure: Lumbar Epidural Steroid Injection (RONALD)    Precautions: [facility] Precautions spine injections: None. Patient denies any prior sensitivity to steroid, local anesthetic, contrast dye, iodine or shellfish. The procedure was discussed at length with the patient and informed consent was signed. The patient was placed in a prone position on the fluoroscopy table and the skin was prepped and draped in a routine sterile fashion. The area to be injected was anesthetized with approximately 5 cc of 1% Lidocaine. Initially a 22-gauge 5 inch spinal needle was carefully advanced under fluoroscopic guidance to the right L5-S1 interlaminar epidural space. At this time 0.25 cc of omnipaque administered. . Once proper placement was confirmed, 1.5 cc of sterile water and 100 mg of Kenalog were injected through the spinal needle. Fluoroscopic guidance was used intermittently over a 10-minute period to insure proper needle placement and patient safety. A hard copy of the fluoroscopic  images has been placed in the patient's chart. The patient was monitored after the procedure and discharged home without complication. A total of 2.5 cc of Kenalog were administered during this procedure.     Resume Meds:  N/A    Altagracia Riley MD  06/09/23

## 2023-06-22 DIAGNOSIS — M79.7 FIBROMYALGIA: ICD-10-CM

## 2023-06-22 RX ORDER — PREGABALIN 150 MG/1
150 CAPSULE ORAL 3 TIMES DAILY
Qty: 90 CAPSULE | Refills: 0 | Status: SHIPPED | OUTPATIENT
Start: 2023-06-22 | End: 2023-09-20

## 2023-06-22 NOTE — TELEPHONE ENCOUNTER
Pt needs a refill of pregabalin (LYRICA) 150 MG capsule to be sent to [facility]. Pt has 2 days left.

## 2023-06-28 ENCOUNTER — OFFICE VISIT (OUTPATIENT)
Dept: GYNECOLOGY | Age: 43
End: 2023-06-28
Payer: COMMERCIAL

## 2023-06-28 VITALS — DIASTOLIC BLOOD PRESSURE: 80 MMHG | WEIGHT: 264 LBS | SYSTOLIC BLOOD PRESSURE: 118 MMHG | BODY MASS INDEX: 45.32 KG/M2

## 2023-06-28 DIAGNOSIS — Z12.4 ENCOUNTER FOR PAPANICOLAOU SMEAR FOR CERVICAL CANCER SCREENING: ICD-10-CM

## 2023-06-28 DIAGNOSIS — R10.2 PELVIC PAIN: Primary | ICD-10-CM

## 2023-06-28 PROCEDURE — 99214 OFFICE O/P EST MOD 30 MIN: CPT | Performed by: OBSTETRICS & GYNECOLOGY

## 2023-06-28 PROCEDURE — 76830 TRANSVAGINAL US NON-OB: CPT | Performed by: OBSTETRICS & GYNECOLOGY

## 2023-07-04 LAB
CYTOLOGIST CVX/VAG CYTO: NORMAL
CYTOLOGY CVX/VAG DOC THIN PREP: NORMAL
HPV REFLEX: NORMAL
Lab: NORMAL
PATH REPORT.FINAL DX SPEC: NORMAL
STAT OF ADQ CVX/VAG CYTO-IMP: NORMAL

## 2023-07-11 SDOH — ECONOMIC STABILITY: FOOD INSECURITY: WITHIN THE PAST 12 MONTHS, THE FOOD YOU BOUGHT JUST DIDN'T LAST AND YOU DIDN'T HAVE MONEY TO GET MORE.: SOMETIMES TRUE

## 2023-07-11 SDOH — ECONOMIC STABILITY: TRANSPORTATION INSECURITY
IN THE PAST 12 MONTHS, HAS LACK OF TRANSPORTATION KEPT YOU FROM MEETINGS, WORK, OR FROM GETTING THINGS NEEDED FOR DAILY LIVING?: PATIENT DECLINED

## 2023-07-11 SDOH — ECONOMIC STABILITY: HOUSING INSECURITY
IN THE LAST 12 MONTHS, WAS THERE A TIME WHEN YOU DID NOT HAVE A STEADY PLACE TO SLEEP OR SLEPT IN A SHELTER (INCLUDING NOW)?: PATIENT REFUSED

## 2023-07-11 SDOH — ECONOMIC STABILITY: INCOME INSECURITY: HOW HARD IS IT FOR YOU TO PAY FOR THE VERY BASICS LIKE FOOD, HOUSING, MEDICAL CARE, AND HEATING?: HARD

## 2023-07-11 SDOH — ECONOMIC STABILITY: FOOD INSECURITY: WITHIN THE PAST 12 MONTHS, YOU WORRIED THAT YOUR FOOD WOULD RUN OUT BEFORE YOU GOT MONEY TO BUY MORE.: SOMETIMES TRUE

## 2023-07-13 ENCOUNTER — TELEMEDICINE (OUTPATIENT)
Dept: INTERNAL MEDICINE CLINIC | Facility: CLINIC | Age: 43
End: 2023-07-13
Payer: COMMERCIAL

## 2023-07-13 DIAGNOSIS — E53.8 VITAMIN B12 DEFICIENCY: ICD-10-CM

## 2023-07-13 DIAGNOSIS — M79.89 LEG SWELLING: ICD-10-CM

## 2023-07-13 DIAGNOSIS — M79.604 PAIN IN BOTH LOWER EXTREMITIES: ICD-10-CM

## 2023-07-13 DIAGNOSIS — I10 ESSENTIAL HYPERTENSION: Primary | ICD-10-CM

## 2023-07-13 DIAGNOSIS — M79.605 PAIN IN BOTH LOWER EXTREMITIES: ICD-10-CM

## 2023-07-13 DIAGNOSIS — G43.909 MIGRAINE SYNDROME: ICD-10-CM

## 2023-07-13 PROCEDURE — 99214 OFFICE O/P EST MOD 30 MIN: CPT | Performed by: INTERNAL MEDICINE

## 2023-07-13 RX ORDER — CYANOCOBALAMIN 1000 UG/ML
INJECTION, SOLUTION INTRAMUSCULAR; SUBCUTANEOUS
Qty: 1 ML | Refills: 5 | Status: SHIPPED | OUTPATIENT
Start: 2023-07-13

## 2023-07-13 RX ORDER — FUROSEMIDE 20 MG/1
20 TABLET ORAL DAILY PRN
Qty: 30 TABLET | Refills: 1 | Status: SHIPPED | OUTPATIENT
Start: 2023-07-13

## 2023-07-13 RX ORDER — FUROSEMIDE 20 MG/1
20 TABLET ORAL DAILY
COMMUNITY
Start: 2023-05-12

## 2023-07-13 ASSESSMENT — ENCOUNTER SYMPTOMS
ABDOMINAL PAIN: 0
SHORTNESS OF BREATH: 0
COUGH: 0

## 2023-07-13 NOTE — PROGRESS NOTES
Received a fax from pharmacy requesting a refill. Chart reviewed. Medication filled per standing orders.  
visible  [] Abnormal -    HENT:   [x] Normocephalic, atraumatic. [] Abnormal   [] Mouth/Throat: Mucous membranes are moist.     External Ears [] Normal  [] Abnormal-     Neck: [] No visualized mass     Pulmonary/Chest: [x] Respiratory effort normal.  [] No visualized signs of difficulty breathing or respiratory distress        [] Abnormal-      Musculoskeletal:   [] Normal gait with no signs of ataxia         [] Normal range of motion of neck        [] Abnormal-       Neurological:        [x][] No Facial Asymmetry (Cranial nerve 7 motor function) (limited exam to video visit)           No gaze palsy        [] Abnormal-         Skin:        [x] No significant exanthematous lesions or discoloration noted on facial skin         [] Abnormal-            Psychiatric:       [x] Normal Affect [] No Hallucinations        [] Abnormal-     Other pertinent observable physical exam findings-     ASSESSMENT/PLAN:  1. Essential hypertension    - Lipid Panel; Future    2. Leg swelling    - Protein / creatinine ratio, urine; Future  - furosemide (LASIX) 20 MG tablet; Take 1 tablet by mouth daily as needed (leg swelling)  Dispense: 30 tablet; Refill: 1    3. Vitamin B12 deficiency    - cyanocobalamin 1000 MCG/ML injection; Administer every 28 days. Dispense: 1 mL; Refill: 5  - Syringe, Disposable, 1 ML MISC; To be used with monthly vitamin b12 shots  Dispense: 1 each; Refill: 5  - Vascular duplex lower extremity venous bilateral; Future    4. Pain in both lower extremities    - Vascular duplex lower extremity venous bilateral; Future    No follow-ups on file. Goran Briones, was evaluated through a synchronous (real-time) audio-video encounter. The patient (or guardian if applicable) is aware that this is a billable service, which includes applicable co-pays. This Virtual Visit was conducted with patient's (and/or legal guardian's) consent. Patient identification was verified, and a caregiver was present when appropriate.

## 2023-07-17 ENCOUNTER — TELEPHONE (OUTPATIENT)
Dept: ORTHOPEDIC SURGERY | Age: 43
End: 2023-07-17

## 2023-07-17 DIAGNOSIS — M54.16 LUMBAR RADICULOPATHY: Primary | ICD-10-CM

## 2023-07-17 NOTE — TELEPHONE ENCOUNTER
l    Name: Xena Kuhn  YOB: 1980  Gender: female  MRN: [id number]      This patient has requested repeat RONALD . The most recent injection provided 80% pain reduction and improvement in functioning for at least 5 weeks    The patient's current pain scale is 5 weeks    As a result of the current recurrence of pain, the patient is having the following difficulties:  Difficulties with bathing and/or dressing  Difficulty sleeping at night  Difficulty transferring into or out of a car  Difficulty performing housework  Difficulty with sitting or performing work related activities    The patient has severe pain limiting function despite on-going, conservative, physician-guided treatment. The patient is currently regularly engaging in physician or PT directed lumbar spine exercies.       Arley Kim MD

## 2023-07-20 DIAGNOSIS — I10 ESSENTIAL HYPERTENSION: ICD-10-CM

## 2023-07-20 LAB
CHOLEST SERPL-MCNC: 184 MG/DL
HDLC SERPL-MCNC: 58 MG/DL (ref 40–60)
HDLC SERPL: 3.2
LDLC SERPL CALC-MCNC: 110 MG/DL
TRIGL SERPL-MCNC: 80 MG/DL (ref 35–150)
VLDLC SERPL CALC-MCNC: 16 MG/DL (ref 6–23)

## 2023-07-24 DIAGNOSIS — M79.7 FIBROMYALGIA: ICD-10-CM

## 2023-07-24 RX ORDER — PREGABALIN 150 MG/1
150 CAPSULE ORAL 3 TIMES DAILY
Qty: 90 CAPSULE | Refills: 0 | Status: SHIPPED | OUTPATIENT
Start: 2023-07-24 | End: 2023-08-24 | Stop reason: SDUPTHER

## 2023-08-02 ENCOUNTER — OFFICE VISIT (OUTPATIENT)
Dept: ORTHOPEDIC SURGERY | Age: 43
End: 2023-08-02

## 2023-08-02 ENCOUNTER — TELEPHONE (OUTPATIENT)
Dept: INTERNAL MEDICINE CLINIC | Facility: CLINIC | Age: 43
End: 2023-08-02

## 2023-08-02 DIAGNOSIS — M54.16 LUMBAR RADICULOPATHY: Primary | ICD-10-CM

## 2023-08-02 RX ORDER — TRIAMCINOLONE ACETONIDE 40 MG/ML
100 INJECTION, SUSPENSION INTRA-ARTICULAR; INTRAMUSCULAR ONCE
Status: COMPLETED | OUTPATIENT
Start: 2023-08-02 | End: 2023-08-02

## 2023-08-02 RX ADMIN — TRIAMCINOLONE ACETONIDE 100 MG: 40 INJECTION, SUSPENSION INTRA-ARTICULAR; INTRAMUSCULAR at 08:30

## 2023-08-02 NOTE — TELEPHONE ENCOUNTER
I called and spoke to [facility]( physicians substitute) and he stated that they do not contact physician's office. They give forms to pts. to take to their doctor's office if they find a issues with the pt.

## 2023-08-02 NOTE — PROGRESS NOTES
Date: 08/02/23   Name: Susy Ontiveros    Pre-Procedural Diagnosis:    Diagnosis Orders   1. Lumbar radiculopathy  XR INJ SPINE THER SUBST LUM/SAC W IMG    triamcinolone acetonide (KENALOG-40) injection 100 mg          Procedure: Lumbar Epidural Steroid Injection (RONALD)    Precautions: [facility] Precautions spine injections: None. Patient denies any prior sensitivity to steroid, local anesthetic, contrast dye, iodine or shellfish. The procedure was discussed at length with the patient and informed consent was signed. The patient was placed in a prone position on the fluoroscopy table and the skin was prepped and draped in a routine sterile fashion. The area to be injected was anesthetized with approximately 5 cc of 1% Lidocaine. Initially a 22-gauge 5 inch spinal needle was carefully advanced under fluoroscopic guidance to the right L5-S1 interlaminar epidural space. At this time 0.25 cc of omnipaque administered. . Once proper placement was confirmed, 1.5 cc of sterile water and 100 mg of Kenalog were injected through the spinal needle. Fluoroscopic guidance was used intermittently over a 10-minute period to insure proper needle placement and patient safety. A hard copy of the fluoroscopic  images has been placed in the patient's chart. The patient was monitored after the procedure and discharged home without complication. A total of 2.5 cc of Kenalog were administered during this procedure.     Resume Meds:  N/A    Fabio Brewster MD  08/02/23

## 2023-08-02 NOTE — TELEPHONE ENCOUNTER
Call from patient indicating [employer] has attempted to reach office with questions regarding form completed; patient asking if office can call [employer] at [phone]

## 2023-08-09 ENCOUNTER — OFFICE VISIT (OUTPATIENT)
Dept: NEUROLOGY | Age: 43
End: 2023-08-09

## 2023-08-09 VITALS
SYSTOLIC BLOOD PRESSURE: 134 MMHG | HEART RATE: 82 BPM | DIASTOLIC BLOOD PRESSURE: 88 MMHG | BODY MASS INDEX: 44.39 KG/M2 | HEIGHT: 64 IN | WEIGHT: 260 LBS | OXYGEN SATURATION: 97 %

## 2023-08-09 DIAGNOSIS — G43.109 MIGRAINE WITH AURA AND WITHOUT STATUS MIGRAINOSUS, NOT INTRACTABLE: Primary | ICD-10-CM

## 2023-08-09 DIAGNOSIS — M54.81 BILATERAL OCCIPITAL NEURALGIA: ICD-10-CM

## 2023-08-09 DIAGNOSIS — M54.2 NECK PAIN: ICD-10-CM

## 2023-08-09 RX ORDER — TOPIRAMATE 25 MG/1
25 TABLET ORAL NIGHTLY
Qty: 90 TABLET | Refills: 1 | Status: SHIPPED | OUTPATIENT
Start: 2023-08-09

## 2023-08-09 RX ORDER — RIMEGEPANT SULFATE 75 MG/75MG
75 TABLET, ORALLY DISINTEGRATING ORAL
Qty: 9 TABLET | Refills: 3 | Status: SHIPPED | OUTPATIENT
Start: 2023-08-09 | End: 2023-08-09

## 2023-08-09 ASSESSMENT — ENCOUNTER SYMPTOMS
ALLERGIC/IMMUNOLOGIC NEGATIVE: 1
EYE PAIN: 0
PHOTOPHOBIA: 1
NAUSEA: 1
EYE REDNESS: 0
VOMITING: 1

## 2023-08-09 NOTE — ASSESSMENT & PLAN NOTE
Discussed patient's frequency with more treatment for migraine prevention. Patient is actively working to lose weight. She has a history of gastric sleeve and morbid obesity. Discussed topiramate would be a beneficial option for migraine prevention as well as weight reduction. Start topiramate 25 mg, 1 tablet by mouth at bedtime for 2 weeks, then increase to 2 tablets at bedtime. Advised the desirable duration on this therapy would be a 90-day trial.  She will continue treatment with duloxetine and Lyrica. This is mostly prescribed for the treatment of her fibromyalgia but has also proven to be slightly beneficial in this patient's case for migraine prevention. She has tried and failed for triptans which would warrant intervention with Nurtec. Nurtec 75 mg at onset of migraine. No more than 1 tablet in 24 hours. Keep migraine diary. Avoid known triggers. She will continue to receive iron infusions as appropriate.   Advised that inadequately treated iron deficiency may negatively impact her migraines

## 2023-08-09 NOTE — PROGRESS NOTES
Freddy Avendano is a 37 y.o. female  with a history of fibromyalgia, iron deficiency anemia, anxiety, low serum B12, hypothyroidism, obesity who presents with headaches. CC: Headache      Referred by   Lisa Dean MD      HPI:        Previously saw Dr. Aleshia Gale in 8/2019. She describes three different types of headaches. First is \"tension\" located occipital region/neck or a \"fibro\" headache associated with upper neck tension. Her mild headaches are located to top of her head. A severe migraine is located unilateral, radiating holocephalic. They began In her teens. The current frequency of headaches: 6-8 severe migraines per month, 4 occipital headaches, and 2 tension headaches. Approx 12/month. Duration: hours if untreated. Migraines intensify over 30-45 minutes. Severity is varies between mild-severe. Quality of headaches are described as throbbing, pulsating. Associated symptoms: photophobia, phonophobia, nausea, dizziness, osmophobia, will see \"sparkles\"/ or \"squiggles\" on occasion but not always. The headaches are exacerbated by has not identified . Factors that relieve the headaches are Lying down in a dark room with a cold cloth, taking 800mg ibuprofen. She has been taking Ibuprofen 800mg multiple times per week for months. Most bothersome migraine symptoms Pain. Previous Imaging Yes, as below. She reports a history of fibromyalgia, treated with Duloxetine and Lyrica, which managed her fibromyalgia symptoms as well as her migraines. Migraines worsened in frequency and severity which prompted her initial work up with our office in 2019. She was prescribed Aimovig 70mg, took for 1 month, but noted 5 days after the injection that she experienced worsening severity of her migraines. Denies allergic reaction to Aimovig. She has been tested for NU, negative sleep study per patient. Has history of iron deficiency anemia, treated with IV iron 1-2x per year.        P O U

## 2023-08-09 NOTE — ASSESSMENT & PLAN NOTE
Referral to accelerated physical therapy-[facility] [place].   Consider occipital nerve block

## 2023-08-24 DIAGNOSIS — M79.7 FIBROMYALGIA: ICD-10-CM

## 2023-08-24 RX ORDER — PREGABALIN 150 MG/1
150 CAPSULE ORAL 3 TIMES DAILY
Qty: 90 CAPSULE | Refills: 0 | Status: SHIPPED | OUTPATIENT
Start: 2023-08-24 | End: 2023-09-23

## 2023-08-24 NOTE — TELEPHONE ENCOUNTER
Refill:  Pregabalin 150 mg        Send:  Siva Dumont  [id number] [address] [facility],[address]   [phone]

## 2023-08-28 ENCOUNTER — TELEPHONE (OUTPATIENT)
Dept: INTERNAL MEDICINE CLINIC | Facility: CLINIC | Age: 43
End: 2023-08-28

## 2023-08-28 NOTE — TELEPHONE ENCOUNTER
----- Message from Little Merino sent at 8/28/2023  1:44 PM EDT -----  Subject: Referral Request    Reason for referral request? Pt stated that lower back and Rt hip is still   hurting from fall on 8/19. Wants a x-ray   Provider patient wants to be referred to(if known):     Provider Phone Number(if known): Additional Information for Provider? Wants to be referred to the same   place Pt is getting her Ultrasound done on Aug 30th.  She wants them both   done same day.  ---------------------------------------------------------------------------  --------------  Mar Suresh INFO    [phone]; OK to leave message on voicemail  ---------------------------------------------------------------------------  --------------

## 2023-08-29 NOTE — TELEPHONE ENCOUNTER
Patient RTC from Gabriel Bella, I relayed the message from the Dr to patient, and informed her that she would need to be seen before and order for Xray could be sent, I offered patient several different available appointments from today through next Friday, patient refused all appointments offered, patient will call back after speaking with a family member to confirm a day she will be available, patient also states she only has phone services when connected to [employer] and preferred to be contacted Via [employer].

## 2023-09-12 ENCOUNTER — HOSPITAL ENCOUNTER (OUTPATIENT)
Dept: ULTRASOUND IMAGING | Age: 43
Discharge: HOME | End: 2023-09-15
Attending: INTERNAL MEDICINE
Payer: COMMERCIAL

## 2023-09-12 DIAGNOSIS — M79.604 PAIN IN BOTH LOWER EXTREMITIES: ICD-10-CM

## 2023-09-12 DIAGNOSIS — E53.8 VITAMIN B12 DEFICIENCY: ICD-10-CM

## 2023-09-12 DIAGNOSIS — M79.605 PAIN IN BOTH LOWER EXTREMITIES: ICD-10-CM

## 2023-09-12 PROCEDURE — 93970 EXTREMITY STUDY: CPT

## 2023-09-21 RX ORDER — RIMEGEPANT SULFATE 75 MG/75MG
75 TABLET, ORALLY DISINTEGRATING ORAL
Qty: 9 TABLET | Refills: 3 | Status: SHIPPED | OUTPATIENT
Start: 2023-09-21 | End: 2023-09-21

## 2023-09-25 DIAGNOSIS — M79.7 FIBROMYALGIA: ICD-10-CM

## 2023-09-25 RX ORDER — PREGABALIN 150 MG/1
150 CAPSULE ORAL 3 TIMES DAILY
Qty: 90 CAPSULE | Refills: 0 | Status: CANCELLED | OUTPATIENT
Start: 2023-09-25 | End: 2023-10-25

## 2023-09-26 RX ORDER — PREGABALIN 150 MG/1
CAPSULE ORAL
Qty: 90 CAPSULE | Refills: 0 | Status: SHIPPED | OUTPATIENT
Start: 2023-09-26 | End: 2023-10-26

## 2023-10-03 ENCOUNTER — TELEPHONE (OUTPATIENT)
Dept: NEUROLOGY | Age: 43
End: 2023-10-03

## 2023-10-04 ENCOUNTER — OFFICE VISIT (OUTPATIENT)
Dept: NEUROLOGY | Age: 43
End: 2023-10-04
Payer: COMMERCIAL

## 2023-10-04 ENCOUNTER — CLINICAL DOCUMENTATION (OUTPATIENT)
Dept: NEUROLOGY | Age: 43
End: 2023-10-04

## 2023-10-04 VITALS — HEIGHT: 64 IN | WEIGHT: 258 LBS | BODY MASS INDEX: 44.05 KG/M2

## 2023-10-04 DIAGNOSIS — M54.81 BILATERAL OCCIPITAL NEURALGIA: ICD-10-CM

## 2023-10-04 DIAGNOSIS — G43.109 MIGRAINE WITH AURA AND WITHOUT STATUS MIGRAINOSUS, NOT INTRACTABLE: ICD-10-CM

## 2023-10-04 PROCEDURE — 99214 OFFICE O/P EST MOD 30 MIN: CPT | Performed by: NURSE PRACTITIONER

## 2023-10-04 RX ORDER — TOPIRAMATE 25 MG/1
50 TABLET ORAL NIGHTLY
Qty: 180 TABLET | Refills: 1 | Status: SHIPPED | OUTPATIENT
Start: 2023-10-04 | End: 2024-10-03

## 2023-10-04 RX ORDER — ZINC GLUCONATE 50 MG
50 TABLET ORAL DAILY
COMMUNITY

## 2023-10-04 RX ORDER — RIMEGEPANT SULFATE 75 MG/75MG
75 TABLET, ORALLY DISINTEGRATING ORAL
Qty: 9 TABLET | Refills: 4 | Status: SHIPPED | OUTPATIENT
Start: 2023-10-04 | End: 2023-10-04

## 2023-10-04 ASSESSMENT — ENCOUNTER SYMPTOMS
ALLERGIC/IMMUNOLOGIC NEGATIVE: 1
EYE PAIN: 0
NAUSEA: 1
EYE REDNESS: 0
VOMITING: 1
PHOTOPHOBIA: 1

## 2023-10-04 NOTE — PROGRESS NOTES
Padmaja Mcclellan is a 37 y.o. female  with a history of fibromyalgia, iron deficiency anemia, anxiety, low serum B12, hypothyroidism, obesity who presents with headaches. CC: Headache      HPI:        Previously saw Dr. Silas Trotter in 8/2019. Since last visit, she has started Topirimate. She took 25mg at bedtime but took longer to increase to the 50mg than directed, she was confused by the directions. She has been on 50mg at bedtime for 2 weeks. Since adding this, she is no longer waking with daily headaches! She has seen a 50% reduction in severe migrianes per month. She did not get her Nurtec as there were delays in at the pharmacy. Approved as of yesterday, so she has been taking 800mg Ibuprofen at onset of severe headaches. She did trial the Nurtec samples which provided 75-80% reduction in pain freedom. Since last visit, she reports 3 mild headaches which are located to the top of her head. Her migraines are located retro-orbital, unilateral, radiating to holocephalic. She describes three different types of headaches. First is \"tension\" located occipital region/neck or a \"fibro\" headache associated with upper neck tension. They began In her teens. Duration: hours if untreated. Migraines intensify over 30-45 minutes. Severity is varies between mild-severe. Quality of headaches are described as throbbing, pulsating. Associated symptoms: photophobia, phonophobia, nausea, dizziness, osmophobia, will see \"sparkles\"/ or \"squiggles\" on occasion but not always. The headaches are exacerbated by has not identified . Factors that relieve the headaches are Lying down in a dark room with a cold cloth, taking 800mg ibuprofen. Most bothersome migraine symptoms Pain. Previous Imaging Yes, as below. Additionally, we sent a few PT referrals but her insurance was not accepted at one of these and the other didn't offer conducive hours. She would like to go to [facility] in [place] due to proximity to her home.

## 2023-10-04 NOTE — ASSESSMENT & PLAN NOTE
Continue topirimate 50mg at bedtime. She will continue with duloxetine and lyrica, which are mostly for her fibromyalgia but has proven to be slightly beneficial in patients migraine management as well. She has tried and failed multiple triptans. Nurtec approved, will proceed with this for rescue. No more than 1 tablet in 24 hours. D/C NSAID use for abortative treatment of migraines.      Continue iron infusions as appropriate through PCP

## 2023-10-23 ENCOUNTER — TELEPHONE (OUTPATIENT)
Dept: ORTHOPEDIC SURGERY | Age: 43
End: 2023-10-23

## 2023-10-23 DIAGNOSIS — M48.062 LUMBAR STENOSIS WITH NEUROGENIC CLAUDICATION: ICD-10-CM

## 2023-10-23 DIAGNOSIS — M54.16 LUMBAR RADICULOPATHY: Primary | ICD-10-CM

## 2023-10-23 NOTE — TELEPHONE ENCOUNTER
Appointment Request  (Newest Message First)  [address] Staff 4 hours ago (10:27 AM)       Appointment Request From: Bev Beavers     With Provider: Rosalino Ibarra MD [[facility] [id number] [facility] [facility]]     Preferred Date Range: Any     Preferred Times: Any Time     Reason for visit: Request an Appointment     Comments:  RONALD needs to be done. Please schedule around 8:30am or 3pm. Thank you.

## 2023-10-24 ENCOUNTER — TELEMEDICINE (OUTPATIENT)
Dept: INTERNAL MEDICINE CLINIC | Facility: CLINIC | Age: 43
End: 2023-10-24
Payer: COMMERCIAL

## 2023-10-24 DIAGNOSIS — M79.7 FIBROMYALGIA: ICD-10-CM

## 2023-10-24 DIAGNOSIS — E53.8 VITAMIN B12 DEFICIENCY: ICD-10-CM

## 2023-10-24 DIAGNOSIS — I10 ESSENTIAL HYPERTENSION: ICD-10-CM

## 2023-10-24 DIAGNOSIS — M25.552 BILATERAL HIP PAIN: Primary | ICD-10-CM

## 2023-10-24 DIAGNOSIS — E03.9 HYPOTHYROIDISM, UNSPECIFIED TYPE: ICD-10-CM

## 2023-10-24 DIAGNOSIS — M25.551 BILATERAL HIP PAIN: Primary | ICD-10-CM

## 2023-10-24 PROCEDURE — 99214 OFFICE O/P EST MOD 30 MIN: CPT | Performed by: INTERNAL MEDICINE

## 2023-10-24 RX ORDER — PREGABALIN 150 MG/1
CAPSULE ORAL
Qty: 90 CAPSULE | Refills: 0 | Status: SHIPPED | OUTPATIENT
Start: 2023-10-24 | End: 2023-11-30

## 2023-10-24 NOTE — PROGRESS NOTES
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     Fall 3 months ago when walking with dog, fell on the right hip, still has pain in right hip, worsening  Current Smoker - not ready to quit today; cut down to quarter a pack; strongly advised to quit smoking  Iron deficiency anemia - seeing Hematology; saw GI  Vitamin B 12 deficiency - on Injections and Folic acid  Vitamin D deficiency - on supplement  Lumbar radiculopathy - using cane for balance, saw Orthopaedics and getting RONALD  Hypothyroidism - on Levothyroxine  Morbid obesity s/p gastric sleeve 9-10 years ago, advised to lose 10-15 pounds by next visit; Advised low carb diet <125 grams a day/carbs  H/o Fibromyalgia, Anxiety - on Lyrica, Cymbalta, Celexa  H/o Axillary folliculitis - right side  Migraine syndrome - saw Neurology before  Family history of Diabetes - in Father  Sees Dr. Juarez Pop [facility]  Swelling legs - better on lasix as needed    Review of Systems    Prior to Visit Medications    Medication Sig Taking? Authorizing Provider   pregabalin (LYRICA) 150 MG capsule TAKE 1 CAPSULE BY MOUTH THREE TIMES DAILY FOR 30 DAYS Yes Dick Mahmood MD   zinc gluconate 50 MG tablet Take 1 tablet by mouth daily Yes Provider, MD Ebonie   topiramate (TOPAMAX) 25 MG tablet Take 2 tablets by mouth at bedtime Yes KRISSY Fernández - NP   cyanocobalamin 1000 MCG/ML injection Administer every 28 days.  Yes Dick Mahmood MD   Syringe, Disposable, 1 ML MISC To be used with monthly vitamin b12 shots Yes Dick Mahmood MD   furosemide (LASIX) 20 MG tablet Take 1 tablet by mouth daily as needed (leg swelling) Yes Jossy Garces MD   albuterol sulfate HFA (PROVENTIL;VENTOLIN;PROAIR) 108 (90 Base) MCG/ACT inhaler INHALE 1 TO 2 PUFFS EVERY 4 TO 6 HOURS

## 2023-10-24 NOTE — TELEPHONE ENCOUNTER
Called patient with injection appointment for 11/15 at 8:30 [address] with Dr Louis Hodge.

## 2023-10-26 NOTE — TELEPHONE ENCOUNTER
eric    Name: Darlyn Novoa  YOB: 1980  Gender: female  MRN: [id number]      This patient has requested repeat RONALD. The most recent injection provided 80% pain reduction and improvement in functioning for at least 2 months. The patient's current pain scale is 8/10. .    As a result of the current recurrence of pain, the patient is having the following difficulties:  Difficulties with bathing and/or dressing  Difficulty sleeping at night  Difficulty transferring into or out of a car  Difficulty performing housework  Difficulty with sitting or performing work related activities    The patient has severe pain limiting function despite on-going, conservative, physician-guided treatment. The patient is currently regularly engaging in physician or PT directed lumbar spine exercies.       Cally Light MD

## 2023-10-30 DIAGNOSIS — E53.8 VITAMIN B12 DEFICIENCY: ICD-10-CM

## 2023-10-30 DIAGNOSIS — E53.8 FOLIC ACID DEFICIENCY: ICD-10-CM

## 2023-10-30 DIAGNOSIS — D50.9 IRON DEFICIENCY ANEMIA, UNSPECIFIED IRON DEFICIENCY ANEMIA TYPE: Primary | ICD-10-CM

## 2023-11-01 ENCOUNTER — HOSPITAL ENCOUNTER (OUTPATIENT)
Dept: GENERAL RADIOLOGY | Age: 43
Discharge: HOME | End: 2023-11-04
Payer: COMMERCIAL

## 2023-11-01 ENCOUNTER — TELEPHONE (OUTPATIENT)
Dept: INTERNAL MEDICINE CLINIC | Facility: CLINIC | Age: 43
End: 2023-11-01

## 2023-11-01 DIAGNOSIS — M25.551 BILATERAL HIP PAIN: ICD-10-CM

## 2023-11-01 DIAGNOSIS — M25.552 BILATERAL HIP PAIN: ICD-10-CM

## 2023-11-01 PROCEDURE — 73523 X-RAY EXAM HIPS BI 5/> VIEWS: CPT

## 2023-11-01 NOTE — TELEPHONE ENCOUNTER
----- Message from Osmany Middleton MD sent at 11/1/2023  4:29 PM EDT -----  Xray hip normal - let her know.

## 2023-11-06 ENCOUNTER — HOSPITAL ENCOUNTER (OUTPATIENT)
Dept: LAB | Age: 43
Discharge: HOME | End: 2023-11-09
Payer: COMMERCIAL

## 2023-11-06 ENCOUNTER — OFFICE VISIT (OUTPATIENT)
Dept: ONCOLOGY | Age: 43
End: 2023-11-06
Payer: COMMERCIAL

## 2023-11-06 VITALS
BODY MASS INDEX: 43.8 KG/M2 | SYSTOLIC BLOOD PRESSURE: 122 MMHG | HEART RATE: 67 BPM | WEIGHT: 255.2 LBS | OXYGEN SATURATION: 99 % | RESPIRATION RATE: 18 BRPM | TEMPERATURE: 98.2 F | DIASTOLIC BLOOD PRESSURE: 93 MMHG

## 2023-11-06 DIAGNOSIS — E53.8 VITAMIN B12 DEFICIENCY: ICD-10-CM

## 2023-11-06 DIAGNOSIS — E53.8 FOLIC ACID DEFICIENCY: ICD-10-CM

## 2023-11-06 DIAGNOSIS — D50.9 IRON DEFICIENCY ANEMIA, UNSPECIFIED IRON DEFICIENCY ANEMIA TYPE: ICD-10-CM

## 2023-11-06 DIAGNOSIS — D50.9 IRON DEFICIENCY ANEMIA, UNSPECIFIED IRON DEFICIENCY ANEMIA TYPE: Primary | ICD-10-CM

## 2023-11-06 LAB
ALBUMIN SERPL-MCNC: 3.6 G/DL (ref 3.5–5)
ALBUMIN/GLOB SERPL: 1.1 (ref 0.4–1.6)
ALP SERPL-CCNC: 75 U/L (ref 50–136)
ALT SERPL-CCNC: 20 U/L (ref 12–65)
ANION GAP SERPL CALC-SCNC: 4 MMOL/L (ref 2–11)
AST SERPL-CCNC: 17 U/L (ref 15–37)
BASOPHILS # BLD: 0 K/UL (ref 0–0.2)
BASOPHILS NFR BLD: 0 % (ref 0–2)
BILIRUB SERPL-MCNC: 0.6 MG/DL (ref 0.2–1.1)
BUN SERPL-MCNC: 10 MG/DL (ref 6–23)
CALCIUM SERPL-MCNC: 9.1 MG/DL (ref 8.3–10.4)
CHLORIDE SERPL-SCNC: 111 MMOL/L (ref 101–110)
CO2 SERPL-SCNC: 26 MMOL/L (ref 21–32)
CREAT SERPL-MCNC: 0.8 MG/DL (ref 0.6–1)
DIFFERENTIAL METHOD BLD: NORMAL
EOSINOPHIL # BLD: 0.1 K/UL (ref 0–0.8)
EOSINOPHIL NFR BLD: 1 % (ref 0.5–7.8)
ERYTHROCYTE [DISTWIDTH] IN BLOOD BY AUTOMATED COUNT: 13.3 % (ref 11.9–14.6)
FERRITIN SERPL-MCNC: 426 NG/ML (ref 8–388)
FOLATE SERPL-MCNC: 24.6 NG/ML (ref 3.1–17.5)
GLOBULIN SER CALC-MCNC: 3.4 G/DL (ref 2.8–4.5)
GLUCOSE SERPL-MCNC: 83 MG/DL (ref 65–100)
HCT VFR BLD AUTO: 40 % (ref 35.8–46.3)
HGB BLD-MCNC: 13 G/DL (ref 11.7–15.4)
IMM GRANULOCYTES # BLD AUTO: 0 K/UL (ref 0–0.5)
IMM GRANULOCYTES NFR BLD AUTO: 0 % (ref 0–5)
IRON SATN MFR SERPL: 26 %
IRON SERPL-MCNC: 63 UG/DL (ref 35–150)
LYMPHOCYTES # BLD: 1.6 K/UL (ref 0.5–4.6)
LYMPHOCYTES NFR BLD: 22 % (ref 13–44)
MCH RBC QN AUTO: 30.2 PG (ref 26.1–32.9)
MCHC RBC AUTO-ENTMCNC: 32.5 G/DL (ref 31.4–35)
MCV RBC AUTO: 92.8 FL (ref 82–102)
MONOCYTES # BLD: 0.5 K/UL (ref 0.1–1.3)
MONOCYTES NFR BLD: 7 % (ref 4–12)
NEUTS SEG # BLD: 5 K/UL (ref 1.7–8.2)
NEUTS SEG NFR BLD: 70 % (ref 43–78)
NRBC # BLD: 0 K/UL (ref 0–0.2)
PLATELET # BLD AUTO: 171 K/UL (ref 150–450)
PMV BLD AUTO: 12.3 FL (ref 9.4–12.3)
POTASSIUM SERPL-SCNC: 3.7 MMOL/L (ref 3.5–5.1)
PROT SERPL-MCNC: 7 G/DL (ref 6.3–8.2)
RBC # BLD AUTO: 4.31 M/UL (ref 4.05–5.2)
SODIUM SERPL-SCNC: 141 MMOL/L (ref 133–143)
TIBC SERPL-MCNC: 246 UG/DL (ref 250–450)
VIT B12 SERPL-MCNC: 315 PG/ML (ref 193–986)
WBC # BLD AUTO: 7.2 K/UL (ref 4.3–11.1)

## 2023-11-06 PROCEDURE — 99213 OFFICE O/P EST LOW 20 MIN: CPT | Performed by: INTERNAL MEDICINE

## 2023-11-06 PROCEDURE — 80053 COMPREHEN METABOLIC PANEL: CPT

## 2023-11-06 PROCEDURE — 83540 ASSAY OF IRON: CPT

## 2023-11-06 PROCEDURE — 83550 IRON BINDING TEST: CPT

## 2023-11-06 PROCEDURE — 83090 ASSAY OF HOMOCYSTEINE: CPT

## 2023-11-06 PROCEDURE — 82746 ASSAY OF FOLIC ACID SERUM: CPT

## 2023-11-06 PROCEDURE — 82607 VITAMIN B-12: CPT

## 2023-11-06 PROCEDURE — 83921 ORGANIC ACID SINGLE QUANT: CPT

## 2023-11-06 PROCEDURE — 36415 COLL VENOUS BLD VENIPUNCTURE: CPT

## 2023-11-06 PROCEDURE — 85025 COMPLETE CBC W/AUTO DIFF WBC: CPT

## 2023-11-06 PROCEDURE — 82728 ASSAY OF FERRITIN: CPT

## 2023-11-06 NOTE — PROGRESS NOTES
replacement   - High globulin: SPEP/LC 2018 and 12/21 unremarkable.  ESR/CRP high in past    RTC in 6 months w labs, iron panel, B12, folate, MMA, HC        Yasmin Liz MD   [facility]  [address] [place] [address]  Office : [phone]  Fax : [phone]

## 2023-11-06 NOTE — PATIENT INSTRUCTIONS
Patient Instructions from Today's Visit    Reason for Visit:  Follow up    Plan:  CBC looks good. We will call you if the iron levels come back low. Follow Up:   Follow up in 6 months    Recent Lab Results:  Hospital Outpatient Visit on 11/06/2023   Component Date Value Ref Range Status    WBC 11/06/2023 7.2  4.3 - 11.1 K/uL Final    RBC 11/06/2023 4.31  4.05 - 5.2 M/uL Final    Hemoglobin 11/06/2023 13.0  11.7 - 15.4 g/dL Final    Hematocrit 11/06/2023 40.0  35.8 - 46.3 % Final    MCV 11/06/2023 92.8  82.0 - 102.0 FL Final    MCH 11/06/2023 30.2  26.1 - 32.9 PG Final    MCHC 11/06/2023 32.5  31.4 - 35.0 g/dL Final    RDW 11/06/2023 13.3  11.9 - 14.6 % Final    Platelets 72/70/5002 171  150 - 450 K/uL Final    MPV 11/06/2023 12.3  9.4 - 12.3 FL Final    nRBC 11/06/2023 0.00  0.0 - 0.2 K/uL Final    **Note: Absolute NRBC parameter is now reported with Hemogram**    Neutrophils % 11/06/2023 70  43 - 78 % Final    Lymphocytes % 11/06/2023 22  13 - 44 % Final    Monocytes % 11/06/2023 7  4.0 - 12.0 % Final    Eosinophils % 11/06/2023 1  0.5 - 7.8 % Final    Basophils % 11/06/2023 0  0.0 - 2.0 % Final    Immature Granulocytes 11/06/2023 0  0.0 - 5.0 % Final    Neutrophils Absolute 11/06/2023 5.0  1.7 - 8.2 K/UL Final    Lymphocytes Absolute 11/06/2023 1.6  0.5 - 4.6 K/UL Final    Monocytes Absolute 11/06/2023 0.5  0.1 - 1.3 K/UL Final    Eosinophils Absolute 11/06/2023 0.1  0.0 - 0.8 K/UL Final    Basophils Absolute 11/06/2023 0.0  0.0 - 0.2 K/UL Final    Absolute Immature Granulocyte 11/06/2023 0.0  0.0 - 0.5 K/UL Final    Differential Type 11/06/2023 AUTOMATED    Final        Treatment Summary has been discussed and given to patient: n/a        -------------------------------------------------------------------------------------------------------------------  Please call our office at [phone] if you have any  of the following symptoms:   Fever of 100.5 or greater  Chills  Shortness of breath  Swelling or

## 2023-11-07 LAB — HCYS SERPL-SCNC: 14.5 UMOL/L (ref 0–14.5)

## 2023-11-10 LAB — METHYLMALONATE SERPL-SCNC: 141 NMOL/L (ref 0–378)

## 2023-11-13 RX ORDER — CYANOCOBALAMIN 1000 UG/ML
INJECTION, SOLUTION INTRAMUSCULAR; SUBCUTANEOUS
Qty: 1 ML | Refills: 5 | OUTPATIENT
Start: 2023-11-13

## 2023-11-15 ENCOUNTER — OFFICE VISIT (OUTPATIENT)
Dept: ORTHOPEDIC SURGERY | Age: 43
End: 2023-11-15
Payer: COMMERCIAL

## 2023-11-15 DIAGNOSIS — M54.16 LUMBAR RADICULOPATHY: Primary | ICD-10-CM

## 2023-11-15 PROCEDURE — 62323 NJX INTERLAMINAR LMBR/SAC: CPT | Performed by: PHYSICAL MEDICINE & REHABILITATION

## 2023-11-15 RX ORDER — TRIAMCINOLONE ACETONIDE 40 MG/ML
100 INJECTION, SUSPENSION INTRA-ARTICULAR; INTRAMUSCULAR ONCE
Status: COMPLETED | OUTPATIENT
Start: 2023-11-15 | End: 2023-11-15

## 2023-11-15 RX ADMIN — TRIAMCINOLONE ACETONIDE 100 MG: 40 INJECTION, SUSPENSION INTRA-ARTICULAR; INTRAMUSCULAR at 08:56

## 2023-11-15 NOTE — PROGRESS NOTES
Date: 11/15/23   Name: Juan Harper    Pre-Procedural Diagnosis:    Diagnosis Orders   1. Lumbar radiculopathy  XR INJ SPINE THER SUBST LUM/SAC W IMG    triamcinolone acetonide (KENALOG-40) injection 100 mg          Procedure: Lumbar Epidural Steroid Injection (RONALD)    Precautions: [facility] Precautions spine injections: None. Patient denies any prior sensitivity to steroid, local anesthetic, contrast dye, iodine or shellfish. The procedure was discussed at length with the patient and informed consent was signed. The patient was placed in a prone position on the fluoroscopy table and the skin was prepped and draped in a routine sterile fashion. The area to be injected was anesthetized with approximately 5 cc of 1% Lidocaine. Initially a 22-gauge 5 inch spinal needle was carefully advanced under fluoroscopic guidance to the right L5-S1 interlaminar epidural space. At this time 0.25 cc of omnipaque administered. . Once proper placement was confirmed, 1.5 cc of sterile water and 100 mg of Kenalog were injected through the spinal needle. Fluoroscopic guidance was used intermittently over a 10-minute period to insure proper needle placement and patient safety. A hard copy of the fluoroscopic  images has been placed in the patient's chart. The patient was monitored after the procedure and discharged home without complication. A total of 2.5 cc of Kenalog were administered during this procedure.     Resume Meds:  N/A    Enio Cordova MD  11/15/23

## 2023-11-22 DIAGNOSIS — M79.7 FIBROMYALGIA: ICD-10-CM

## 2023-11-24 RX ORDER — PREGABALIN 150 MG/1
CAPSULE ORAL
Qty: 90 CAPSULE | Refills: 0 | Status: SHIPPED | OUTPATIENT
Start: 2023-11-24 | End: 2023-12-29

## 2023-12-06 RX ORDER — TOPIRAMATE 25 MG/1
50 TABLET ORAL NIGHTLY
Qty: 180 TABLET | Refills: 0 | Status: SHIPPED | OUTPATIENT
Start: 2023-12-06 | End: 2024-12-05

## 2023-12-07 ENCOUNTER — TELEPHONE (OUTPATIENT)
Dept: NEUROLOGY | Age: 43
End: 2023-12-07

## 2023-12-07 RX ORDER — RIMEGEPANT SULFATE 75 MG/75MG
75 TABLET, ORALLY DISINTEGRATING ORAL
Qty: 9 TABLET | Refills: 3 | Status: SHIPPED | OUTPATIENT
Start: 2023-12-07 | End: 2023-12-07

## 2024-01-05 ENCOUNTER — TELEPHONE (OUTPATIENT)
Dept: ORTHOPEDIC SURGERY | Age: 44
End: 2024-01-05

## 2024-01-05 DIAGNOSIS — M47.816 FACET ARTHROPATHY, LUMBAR: ICD-10-CM

## 2024-01-05 DIAGNOSIS — M54.17 LUMBOSACRAL RADICULOPATHY: ICD-10-CM

## 2024-01-05 DIAGNOSIS — M43.16 SPONDYLOLISTHESIS OF LUMBAR REGION: ICD-10-CM

## 2024-01-05 DIAGNOSIS — M51.16 LUMBAR DISC HERNIATION WITH RADICULOPATHY: ICD-10-CM

## 2024-01-05 DIAGNOSIS — M54.16 LUMBAR RADICULOPATHY: Primary | ICD-10-CM

## 2024-01-05 DIAGNOSIS — M48.062 LUMBAR STENOSIS WITH NEUROGENIC CLAUDICATION: ICD-10-CM

## 2024-01-05 DIAGNOSIS — M51.36 DDD (DEGENERATIVE DISC DISEASE), LUMBAR: ICD-10-CM

## 2024-01-05 NOTE — TELEPHONE ENCOUNTER
l    Name: Janet Mckinley  YOB: 1980  Gender: female  MRN: [id number]      This patient has requested repeat RONALD.    The most recent injection provided 80% pain reduction and improvement in functioning for at least 7 weeks    The patient's current pain scale is 8/10..    As a result of the current recurrence of pain, the patient is having the following difficulties:  Difficulties with bathing and/or dressing  Difficulty sleeping at night  Difficulty transferring into or out of a car  Difficulty performing housework  Difficulty with sitting or performing work related activities    The patient has severe pain limiting function despite on-going, conservative, physician-guided treatment.    The patient is currently regularly engaging in physician or PT directed lumbar spine exercies.      Radha Sutton MD

## 2024-01-05 NOTE — TELEPHONE ENCOUNTER
Appointment Request  (Newest Message First)  Janet Mckinley  [address] [facility]  Staff16 hours ago (5:36 PM)       Appointment Request From: Janet Mckinley     With Provider: SHARDA HOLBROOK JR, MD [[facility]]     Preferred Date Range: Any     Preferred Times: Monday Afternoon, Tuesday Afternoon, Wednesday Afternoon, Thursday Afternoon, Friday Afternoon     Reason for visit: Request an Appointment     Comments:  Time for RONALD. As close to 2:45p.m. as possible please.

## 2024-01-22 RX ORDER — FOLIC ACID 1 MG/1
TABLET ORAL
Qty: 30 TABLET | Refills: 11 | Status: SHIPPED | OUTPATIENT
Start: 2024-01-22

## 2024-01-26 DIAGNOSIS — M79.7 FIBROMYALGIA: ICD-10-CM

## 2024-01-29 ENCOUNTER — TELEPHONE (OUTPATIENT)
Dept: INTERNAL MEDICINE CLINIC | Facility: CLINIC | Age: 44
End: 2024-01-29

## 2024-01-29 DIAGNOSIS — M79.7 FIBROMYALGIA: ICD-10-CM

## 2024-01-29 RX ORDER — PREGABALIN 150 MG/1
CAPSULE ORAL
Qty: 90 CAPSULE | Refills: 0 | OUTPATIENT
Start: 2024-01-29 | End: 2024-03-01

## 2024-01-29 RX ORDER — PREGABALIN 150 MG/1
CAPSULE ORAL
Qty: 90 CAPSULE | Refills: 0 | OUTPATIENT
Start: 2024-01-29

## 2024-01-30 ENCOUNTER — TELEMEDICINE (OUTPATIENT)
Dept: INTERNAL MEDICINE CLINIC | Facility: CLINIC | Age: 44
End: 2024-01-30
Payer: COMMERCIAL

## 2024-01-30 DIAGNOSIS — M79.7 FIBROMYALGIA: ICD-10-CM

## 2024-01-30 PROCEDURE — 99213 OFFICE O/P EST LOW 20 MIN: CPT | Performed by: INTERNAL MEDICINE

## 2024-01-30 RX ORDER — PREGABALIN 150 MG/1
CAPSULE ORAL
Qty: 90 CAPSULE | Refills: 0 | Status: SHIPPED | OUTPATIENT
Start: 2024-01-30 | End: 2024-03-05

## 2024-01-30 ASSESSMENT — ENCOUNTER SYMPTOMS
SHORTNESS OF BREATH: 0
ABDOMINAL PAIN: 0
COUGH: 0

## 2024-01-30 NOTE — TELEPHONE ENCOUNTER
Miss Mckinley called to check the status of her request for Pregabalin--150 mg 3x times daily.  She ran out of it since last week.  Normally, she gets a 30 days supply.    Please send her request to Richie Conte.  The phone number is [phone].

## 2024-01-30 NOTE — PROGRESS NOTES
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       Current Smoker - not ready to quit today; cut down to quarter a pack; strongly advised to quit smoking  Iron deficiency anemia - seeing Hematology; saw GI  Vitamin B 12 deficiency - on Injections and Folic acid  Vitamin D deficiency - on supplement  Lumbar radiculopathy - using cane for balance, saw Orthopaedics and getting RONALD  Hypothyroidism - on Levothyroxine  Morbid obesity s/p gastric sleeve 9-10 years ago, advised to lose 10-15 pounds by next visit; Advised low carb diet <125 grams a day/carbs  H/o Fibromyalgia, Anxiety - on Lyrica, Cymbalta, Celexa  H/o Axillary folliculitis - right side  Migraine syndrome - saw Neurology before  Family history of Diabetes - in Father  Sees Dr. Chu Gyn  Swelling legs - better on lasix as needed     Review of Systems   Constitutional:  Negative for fever.   Respiratory:  Negative for cough and shortness of breath.    Cardiovascular:  Negative for chest pain and leg swelling.   Gastrointestinal:  Negative for abdominal pain.   Psychiatric/Behavioral:  Negative for behavioral problems and confusion.        Prior to Visit Medications    Medication Sig Taking? Authorizing Provider   pregabalin (LYRICA) 150 MG capsule TAKE 1 CAPSULE BY MOUTH THREE TIMES DAILY FOR 30 DAYS Yes Jossy Garces MD   folic acid (FOLVITE) 1 MG tablet TAKE 1 TABLET BY MOUTH ONCE DAILY Yes Kierra Daniels MD   topiramate (TOPAMAX) 25 MG tablet Take 2 tablets by mouth at bedtime Yes Thao Case APRN - NP   zinc gluconate 50 MG tablet Take 1 tablet by mouth daily Yes Provider, MD Ebonie   cyanocobalamin 1000 MCG/ML injection Administer every 28 days. Yes Jossy Garces MD   Syringe, Disposable, 1 ML MISC To be used with monthly vitamin

## 2024-01-31 RX ORDER — PREGABALIN 150 MG/1
CAPSULE ORAL
Qty: 90 CAPSULE | Refills: 0 | OUTPATIENT
Start: 2024-01-31 | End: 2024-03-05

## 2024-02-01 RX ORDER — DULOXETIN HYDROCHLORIDE 60 MG/1
CAPSULE, DELAYED RELEASE ORAL
Qty: 30 CAPSULE | Refills: 0 | OUTPATIENT
Start: 2024-02-01

## 2024-02-01 RX ORDER — DULOXETIN HYDROCHLORIDE 60 MG/1
CAPSULE, DELAYED RELEASE ORAL
Qty: 90 CAPSULE | Refills: 3 | OUTPATIENT
Start: 2024-02-01

## 2024-02-16 ENCOUNTER — TELEPHONE (OUTPATIENT)
Dept: ORTHOPEDIC SURGERY | Age: 44
End: 2024-02-16

## 2024-02-21 ENCOUNTER — OFFICE VISIT (OUTPATIENT)
Dept: NEUROLOGY | Age: 44
End: 2024-02-21
Payer: COMMERCIAL

## 2024-02-21 VITALS
SYSTOLIC BLOOD PRESSURE: 132 MMHG | OXYGEN SATURATION: 99 % | WEIGHT: 253 LBS | BODY MASS INDEX: 43.43 KG/M2 | DIASTOLIC BLOOD PRESSURE: 85 MMHG | HEART RATE: 77 BPM

## 2024-02-21 DIAGNOSIS — G43.109 MIGRAINE WITH AURA AND WITHOUT STATUS MIGRAINOSUS, NOT INTRACTABLE: Primary | ICD-10-CM

## 2024-02-21 PROCEDURE — 99214 OFFICE O/P EST MOD 30 MIN: CPT | Performed by: NURSE PRACTITIONER

## 2024-02-21 RX ORDER — TOPIRAMATE 25 MG/1
75 TABLET ORAL NIGHTLY
Qty: 270 TABLET | Refills: 2 | Status: SHIPPED | OUTPATIENT
Start: 2024-02-21 | End: 2025-02-20

## 2024-02-21 RX ORDER — RIMEGEPANT SULFATE 75 MG/75MG
75 TABLET, ORALLY DISINTEGRATING ORAL PRN
Qty: 9 TABLET | Refills: 11 | Status: SHIPPED | OUTPATIENT
Start: 2024-02-21

## 2024-02-21 RX ORDER — RIMEGEPANT SULFATE 75 MG/75MG
75 TABLET, ORALLY DISINTEGRATING ORAL PRN
COMMUNITY
End: 2024-02-21 | Stop reason: SDUPTHER

## 2024-02-21 ASSESSMENT — ENCOUNTER SYMPTOMS
ALLERGIC/IMMUNOLOGIC NEGATIVE: 1
EYE REDNESS: 0
NAUSEA: 1
PHOTOPHOBIA: 1
VOMITING: 1
EYE PAIN: 0

## 2024-02-21 NOTE — ASSESSMENT & PLAN NOTE
Increase topirimate to 75mg at night. Nurtec for rescue. Continue to track migraine frequency.     Continue iron infusions as appropriate though PCP   no

## 2024-02-21 NOTE — PROGRESS NOTES
that may cause headaches.          [ *NOTE: parts of this note were produced using artificial speech recognition software, which may have inadvertent errors in the produced wordings. ]      [facility]

## 2024-02-22 ENCOUNTER — OFFICE VISIT (OUTPATIENT)
Dept: ORTHOPEDIC SURGERY | Age: 44
End: 2024-02-22
Payer: COMMERCIAL

## 2024-02-22 VITALS — BODY MASS INDEX: 43.19 KG/M2 | WEIGHT: 253 LBS | HEIGHT: 64 IN

## 2024-02-22 DIAGNOSIS — M54.16 LUMBAR RADICULOPATHY: Primary | ICD-10-CM

## 2024-02-22 PROCEDURE — 62323 NJX INTERLAMINAR LMBR/SAC: CPT | Performed by: PHYSICAL MEDICINE & REHABILITATION

## 2024-02-22 RX ORDER — TRIAMCINOLONE ACETONIDE 40 MG/ML
100 INJECTION, SUSPENSION INTRA-ARTICULAR; INTRAMUSCULAR ONCE
Status: COMPLETED | OUTPATIENT
Start: 2024-02-22 | End: 2024-02-22

## 2024-02-22 RX ADMIN — TRIAMCINOLONE ACETONIDE 100 MG: 40 INJECTION, SUSPENSION INTRA-ARTICULAR; INTRAMUSCULAR at 15:17

## 2024-02-27 DIAGNOSIS — M79.7 FIBROMYALGIA: ICD-10-CM

## 2024-02-27 RX ORDER — PREGABALIN 150 MG/1
CAPSULE ORAL
Qty: 90 CAPSULE | Refills: 0 | Status: SHIPPED | OUTPATIENT
Start: 2024-02-27 | End: 2024-04-02

## 2024-02-27 NOTE — TELEPHONE ENCOUNTER
Pt last seen 1/30/24. Pt next appt is 4/17/24. Rx last written 1/30/24 for #90 with 0 refills. Rx pended.

## 2024-03-27 ENCOUNTER — TELEPHONE (OUTPATIENT)
Dept: ORTHOPEDIC SURGERY | Age: 44
End: 2024-03-27

## 2024-03-27 DIAGNOSIS — M51.36 DDD (DEGENERATIVE DISC DISEASE), LUMBAR: ICD-10-CM

## 2024-03-27 DIAGNOSIS — M43.16 SPONDYLOLISTHESIS OF LUMBAR REGION: ICD-10-CM

## 2024-03-27 DIAGNOSIS — M54.16 LUMBAR RADICULOPATHY: ICD-10-CM

## 2024-03-27 DIAGNOSIS — M51.16 LUMBAR DISC HERNIATION WITH RADICULOPATHY: ICD-10-CM

## 2024-03-27 DIAGNOSIS — M48.062 LUMBAR STENOSIS WITH NEUROGENIC CLAUDICATION: Primary | ICD-10-CM

## 2024-03-27 DIAGNOSIS — M47.816 FACET ARTHROPATHY, LUMBAR: ICD-10-CM

## 2024-03-27 NOTE — TELEPHONE ENCOUNTER
She is applying for food stamps and has some paperwork asking what she is able to do and not do. She wants to know if [facility] will fill this out for her. Please let her know.    She says the last shot wore off about two weeks ago.

## 2024-03-29 NOTE — TELEPHONE ENCOUNTER
Called and discussed options, she would like to try pain management so I will send a referral. She will drop off form for [facility] to fill out next week.

## 2024-03-31 DIAGNOSIS — M79.7 FIBROMYALGIA: ICD-10-CM

## 2024-04-01 NOTE — TELEPHONE ENCOUNTER
Rx pended for Dr. Garces. Last written 2/27/24 for #90. I tab TID with no refill. Next appt 4/17/24 with Dr. Garces.

## 2024-04-02 RX ORDER — PREGABALIN 150 MG/1
CAPSULE ORAL
Qty: 90 CAPSULE | Refills: 0 | Status: SHIPPED | OUTPATIENT
Start: 2024-04-02 | End: 2024-05-05

## 2024-04-17 DIAGNOSIS — E03.9 HYPOTHYROIDISM, UNSPECIFIED TYPE: ICD-10-CM

## 2024-04-17 DIAGNOSIS — E53.8 VITAMIN B12 DEFICIENCY: ICD-10-CM

## 2024-04-17 DIAGNOSIS — I10 ESSENTIAL HYPERTENSION: ICD-10-CM

## 2024-04-17 LAB
ALBUMIN SERPL-MCNC: 3.2 G/DL (ref 3.5–5)
ALBUMIN/GLOB SERPL: 0.9 (ref 0.4–1.6)
ALP SERPL-CCNC: 63 U/L (ref 50–136)
ALT SERPL-CCNC: 20 U/L (ref 12–65)
ANION GAP SERPL CALC-SCNC: 2 MMOL/L (ref 2–11)
AST SERPL-CCNC: 11 U/L (ref 15–37)
BASOPHILS # BLD: 0.1 K/UL (ref 0–0.2)
BASOPHILS NFR BLD: 1 % (ref 0–2)
BILIRUB SERPL-MCNC: 0.4 MG/DL (ref 0.2–1.1)
BUN SERPL-MCNC: 11 MG/DL (ref 6–23)
CALCIUM SERPL-MCNC: 9.1 MG/DL (ref 8.3–10.4)
CHLORIDE SERPL-SCNC: 111 MMOL/L (ref 103–113)
CHOLEST SERPL-MCNC: 173 MG/DL
CO2 SERPL-SCNC: 26 MMOL/L (ref 21–32)
CREAT SERPL-MCNC: 0.7 MG/DL (ref 0.6–1)
DIFFERENTIAL METHOD BLD: ABNORMAL
EOSINOPHIL # BLD: 0.1 K/UL (ref 0–0.8)
EOSINOPHIL NFR BLD: 2 % (ref 0.5–7.8)
ERYTHROCYTE [DISTWIDTH] IN BLOOD BY AUTOMATED COUNT: 14.6 % (ref 11.9–14.6)
GLOBULIN SER CALC-MCNC: 3.7 G/DL (ref 2.8–4.5)
GLUCOSE SERPL-MCNC: 79 MG/DL (ref 65–100)
HCT VFR BLD AUTO: 40.7 % (ref 35.8–46.3)
HDLC SERPL-MCNC: 59 MG/DL (ref 40–60)
HDLC SERPL: 2.9
HGB BLD-MCNC: 12.9 G/DL (ref 11.7–15.4)
IMM GRANULOCYTES # BLD AUTO: 0 K/UL (ref 0–0.5)
IMM GRANULOCYTES NFR BLD AUTO: 0 % (ref 0–5)
LDLC SERPL CALC-MCNC: 97.2 MG/DL
LYMPHOCYTES # BLD: 1.4 K/UL (ref 0.5–4.6)
LYMPHOCYTES NFR BLD: 21 % (ref 13–44)
MCH RBC QN AUTO: 29.7 PG (ref 26.1–32.9)
MCHC RBC AUTO-ENTMCNC: 31.7 G/DL (ref 31.4–35)
MCV RBC AUTO: 93.8 FL (ref 82–102)
MONOCYTES # BLD: 0.6 K/UL (ref 0.1–1.3)
MONOCYTES NFR BLD: 10 % (ref 4–12)
NEUTS SEG # BLD: 4.5 K/UL (ref 1.7–8.2)
NEUTS SEG NFR BLD: 66 % (ref 43–78)
NRBC # BLD: 0 K/UL (ref 0–0.2)
PLATELET # BLD AUTO: 193 K/UL (ref 150–450)
PMV BLD AUTO: 12.8 FL (ref 9.4–12.3)
POTASSIUM SERPL-SCNC: 3.9 MMOL/L (ref 3.5–5.1)
PROT SERPL-MCNC: 6.9 G/DL (ref 6.3–8.2)
RBC # BLD AUTO: 4.34 M/UL (ref 4.05–5.2)
SODIUM SERPL-SCNC: 139 MMOL/L (ref 136–146)
TRIGL SERPL-MCNC: 84 MG/DL (ref 35–150)
TSH, 3RD GENERATION: 2.14 UIU/ML (ref 0.36–3.74)
VIT B12 SERPL-MCNC: 313 PG/ML (ref 193–986)
VLDLC SERPL CALC-MCNC: 16.8 MG/DL (ref 6–23)
WBC # BLD AUTO: 6.7 K/UL (ref 4.3–11.1)

## 2024-04-26 ENCOUNTER — OFFICE VISIT (OUTPATIENT)
Dept: INTERNAL MEDICINE CLINIC | Facility: CLINIC | Age: 44
End: 2024-04-26
Payer: COMMERCIAL

## 2024-04-26 VITALS
BODY MASS INDEX: 43.15 KG/M2 | SYSTOLIC BLOOD PRESSURE: 132 MMHG | HEART RATE: 64 BPM | DIASTOLIC BLOOD PRESSURE: 72 MMHG | WEIGHT: 251.4 LBS | OXYGEN SATURATION: 98 %

## 2024-04-26 DIAGNOSIS — M79.7 FIBROMYALGIA: ICD-10-CM

## 2024-04-26 DIAGNOSIS — E53.8 VITAMIN B12 DEFICIENCY: ICD-10-CM

## 2024-04-26 PROCEDURE — 99214 OFFICE O/P EST MOD 30 MIN: CPT | Performed by: INTERNAL MEDICINE

## 2024-04-26 RX ORDER — LEVOTHYROXINE SODIUM 88 UG/1
TABLET ORAL
Qty: 90 TABLET | Refills: 1 | Status: SHIPPED | OUTPATIENT
Start: 2024-04-26

## 2024-04-26 RX ORDER — CITALOPRAM 40 MG/1
TABLET ORAL
Qty: 90 TABLET | Refills: 1 | Status: SHIPPED | OUTPATIENT
Start: 2024-04-26

## 2024-04-26 RX ORDER — CYANOCOBALAMIN 1000 UG/ML
INJECTION, SOLUTION INTRAMUSCULAR; SUBCUTANEOUS
Qty: 1 ML | Refills: 5 | Status: SHIPPED | OUTPATIENT
Start: 2024-04-26

## 2024-04-26 RX ORDER — DULOXETIN HYDROCHLORIDE 60 MG/1
CAPSULE, DELAYED RELEASE ORAL
Qty: 90 CAPSULE | Refills: 1 | Status: SHIPPED | OUTPATIENT
Start: 2024-04-26

## 2024-04-26 RX ORDER — PREGABALIN 150 MG/1
CAPSULE ORAL
Qty: 90 CAPSULE | Refills: 0 | Status: SHIPPED | OUTPATIENT
Start: 2024-04-26 | End: 2024-05-29

## 2024-04-26 ASSESSMENT — ENCOUNTER SYMPTOMS
COUGH: 0
ABDOMINAL PAIN: 0
SHORTNESS OF BREATH: 0

## 2024-04-26 NOTE — PROGRESS NOTES
SUBJECTIVE:   Janet Mckinley is a 44 y.o. 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     Current Smoker - not ready to quit today; cut down to quarter a pack; strongly advised to quit smoking  Iron deficiency anemia - seeing Hematology; saw GI  Vitamin B 12 deficiency - on Injections and Folic acid  Vitamin D deficiency - on supplement  Lumbar radiculopathy - using cane for balance, saw Orthopaedics and getting RONALD  Hypothyroidism - on Levothyroxine  Morbid obesity s/p gastric sleeve 9-10 years ago, advised to lose 10-15 pounds by next visit; Advised low carb diet <125 grams a day/carbs  H/o Fibromyalgia, Anxiety - on Lyrica, Cymbalta, Celexa; offered psychiatry referral - does not want to see them at this time  H/o Axillary folliculitis - right side  Migraine syndrome - saw Neurology before  Family history of Diabetes - in Father  Sees Dr. Chu Gyn  Swelling legs - better on lasix as needed     Past Medical History, Past Surgical History, Family history, Social History, and Medications were all reviewed with the patient today and updated as necessary.       Current Outpatient Medications   Medication Sig Dispense Refill    citalopram (CELEXA) 40 MG tablet TAKE 1 TABLET BY MOUTH ONCE DAILY 90 tablet 1    cyanocobalamin 1000 MCG/ML injection Administer every 28 days. 1 mL 5    DULoxetine (CYMBALTA) 60 MG extended release capsule TAKE 1 CAPSULE BY MOUTH ONCE DAILY 90 capsule 1    levothyroxine (SYNTHROID) 88 MCG tablet TAKE 1 TABLET BY MOUTH ONCE DAILY BEFORE BREAKFAST 90 tablet 1    pregabalin (LYRICA) 150 MG capsule TAKE 1 CAPSULE BY MOUTH THREE TIMES DAILY FOR 30 DAYS 90 capsule 0    topiramate (TOPAMAX) 25 MG tablet Take 3 tablets by mouth at bedtime 270 tablet 2    Rimegepant Sulfate (NURTEC) 75 MG TBDP Take 75 mg

## 2024-05-05 DIAGNOSIS — E53.8 VITAMIN B12 DEFICIENCY: ICD-10-CM

## 2024-05-06 ENCOUNTER — TELEPHONE (OUTPATIENT)
Dept: ONCOLOGY | Age: 44
End: 2024-05-06

## 2024-05-07 DIAGNOSIS — D50.9 IRON DEFICIENCY ANEMIA, UNSPECIFIED IRON DEFICIENCY ANEMIA TYPE: Primary | ICD-10-CM

## 2024-05-07 RX ORDER — RIMEGEPANT SULFATE 75 MG/75MG
75 TABLET, ORALLY DISINTEGRATING ORAL PRN
Qty: 9 TABLET | Refills: 11 | Status: SHIPPED | OUTPATIENT
Start: 2024-05-07

## 2024-05-09 ENCOUNTER — TELEPHONE (OUTPATIENT)
Dept: NEUROLOGY | Age: 44
End: 2024-05-09

## 2024-05-10 ENCOUNTER — CLINICAL DOCUMENTATION (OUTPATIENT)
Dept: ORTHOPEDIC SURGERY | Age: 44
End: 2024-05-10

## 2024-05-14 NOTE — PROGRESS NOTES
Socioeconomic History    Marital status: Single     Spouse name: Not on file    Number of children: Not on file    Years of education: Not on file    Highest education level: Not on file   Occupational History    Not on file   Tobacco Use    Smoking status: Every Day     Current packs/day: 0.00     Average packs/day: 0.3 packs/day for 5.0 years (1.3 ttl pk-yrs)     Types: Cigarettes     Start date: 2003     Last attempt to quit: 2008     Years since quittin.2    Smokeless tobacco: Never   Vaping Use    Vaping Use: Never used   Substance and Sexual Activity    Alcohol use: No    Drug use: No    Sexual activity: Not Currently     Partners: Male   Other Topics Concern    Not on file   Social History Narrative    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     Social Determinants of Health     Financial Resource Strain: Not on file   Food Insecurity: Not on file (2023)   Transportation Needs: Not on file   Physical Activity: Not on file   Stress: Not on file   Social Connections: Not on file   Intimate Partner Violence: Not on file   Housing Stability: Unknown (2023)    Housing Stability Vital Sign     Unable to Pay for Housing in the Last Year: Not on file     Number of Places Lived in the Last Year: Not on file     Unstable Housing in the Last Year: No            Allergies   Allergen Reactions    Bismuth Subsalicylate Nausea And Vomiting    Diphenhydramine-Acetaminophen Nausea And Vomiting           Outpatient Encounter Medications as of 2024   Medication Sig Dispense Refill    Syringe, Disposable, 1 ML MISC To be used with monthly vitamin b12 shots 1 each 5    rimegepant sulfate (NURTEC) 75 MG TBDP Take 75 mg by mouth as needed (migraine) Take 1 tablet orally at onset of migraine, do not take more than 1 in 24 hours. 9 tablet 11    citalopram (CELEXA) 40 MG tablet TAKE 1 TABLET BY MOUTH ONCE DAILY 90 tablet 1    cyanocobalamin 1000 MCG/ML injection Administer

## 2024-05-17 ENCOUNTER — OFFICE VISIT (OUTPATIENT)
Age: 44
End: 2024-05-17
Payer: COMMERCIAL

## 2024-05-17 DIAGNOSIS — M54.16 LUMBAR RADICULOPATHY: Primary | ICD-10-CM

## 2024-05-17 PROCEDURE — 99204 OFFICE O/P NEW MOD 45 MIN: CPT | Performed by: ANESTHESIOLOGY

## 2024-05-17 NOTE — PATIENT INSTRUCTIONS
Pain Management Pre-procedure Instructions     MRN: Janet Mckinley      : 1980    DATE: 2024  TIME: 10:00 am  PROVIDER: Jim Morillo MD   LOCATION: [address] [place] [address]    Prior to Procedure   If you develop infectious symptoms (fever, cough, etc.) or are placed on antibiotics, call office at [phone]. You will be transferred to the medical assistant who will discuss your symptoms with the physician. The physician will decide if the procedure needs to be rescheduled.   If you need to reschedule your procedure for any reason, called the office at [phone] at least 24 hours before your scheduled procedure time.   ? If this box is checked, you are on a blood thinner ( ).   You DO/DO NOT need to stop this medication ( days) before your procedure.   ? If this box is checked, we will require clearance by your cardiologist or primary care   before discontinuing your blood thinner ( ). Someone from our   office will call you with instructions on when to stop the medication. DO NOT stop the medication on your own.   ? If this box is checked, you must stop all anti-inflammatories 5 days before your   procedure. This includes Advil (ibuprofen), Motrin (ibuprofen), Aleve (naproxen), Mobic (meloxicam), Voltaren (diclofenac) and Relafen (nabumetone).     Day of Procedure   You must arrive AT LEAST 30 minutes prior to your scheduled procedure to allow for check-in/preparation. Failure to arrive 30 minutes early may cause your procedure to be rescheduled.   You can expect to be at the facility for about 1-1.5 hours, although the procedure itself is typically short.   You may shower, dress, and wear makeup as you normally would. Please remove fingernail polish from at least one index finger.   Take all prescribed medications other than medications you have been specifically told to stop. If you need to reschedule your procedure for any reason, called the office at [phone]

## 2024-05-30 ENCOUNTER — HOSPITAL ENCOUNTER (OUTPATIENT)
Dept: LAB | Age: 44
Discharge: HOME | End: 2024-05-30
Payer: COMMERCIAL

## 2024-05-30 ENCOUNTER — OFFICE VISIT (OUTPATIENT)
Dept: ONCOLOGY | Age: 44
End: 2024-05-30
Payer: COMMERCIAL

## 2024-05-30 VITALS
RESPIRATION RATE: 16 BRPM | DIASTOLIC BLOOD PRESSURE: 84 MMHG | WEIGHT: 250.4 LBS | SYSTOLIC BLOOD PRESSURE: 118 MMHG | HEIGHT: 64 IN | OXYGEN SATURATION: 99 % | HEART RATE: 81 BPM | TEMPERATURE: 98.2 F | BODY MASS INDEX: 42.75 KG/M2

## 2024-05-30 DIAGNOSIS — E53.8 FOLIC ACID DEFICIENCY: ICD-10-CM

## 2024-05-30 DIAGNOSIS — D50.9 IRON DEFICIENCY ANEMIA, UNSPECIFIED IRON DEFICIENCY ANEMIA TYPE: ICD-10-CM

## 2024-05-30 DIAGNOSIS — E53.8 VITAMIN B12 DEFICIENCY: ICD-10-CM

## 2024-05-30 DIAGNOSIS — D50.9 IRON DEFICIENCY ANEMIA, UNSPECIFIED IRON DEFICIENCY ANEMIA TYPE: Primary | ICD-10-CM

## 2024-05-30 LAB
ALBUMIN SERPL-MCNC: 3.7 G/DL (ref 3.5–5)
ALBUMIN/GLOB SERPL: 1.2 (ref 1–1.9)
ALP SERPL-CCNC: 82 U/L (ref 35–104)
ALT SERPL-CCNC: 16 U/L (ref 12–65)
ANION GAP SERPL CALC-SCNC: 9 MMOL/L (ref 9–18)
AST SERPL-CCNC: 20 U/L (ref 15–37)
BASOPHILS # BLD: 0 K/UL (ref 0–0.2)
BASOPHILS NFR BLD: 1 % (ref 0–2)
BILIRUB SERPL-MCNC: 0.3 MG/DL (ref 0–1.2)
BUN SERPL-MCNC: 11 MG/DL (ref 6–23)
CALCIUM SERPL-MCNC: 9.3 MG/DL (ref 8.8–10.2)
CHLORIDE SERPL-SCNC: 105 MMOL/L (ref 98–107)
CO2 SERPL-SCNC: 26 MMOL/L (ref 20–28)
CREAT SERPL-MCNC: 0.74 MG/DL (ref 0.6–1.1)
DIFFERENTIAL METHOD BLD: NORMAL
EOSINOPHIL # BLD: 0.1 K/UL (ref 0–0.8)
EOSINOPHIL NFR BLD: 2 % (ref 0.5–7.8)
ERYTHROCYTE [DISTWIDTH] IN BLOOD BY AUTOMATED COUNT: 13.9 % (ref 11.9–14.6)
FERRITIN SERPL-MCNC: 376 NG/ML (ref 8–388)
GLOBULIN SER CALC-MCNC: 3 G/DL (ref 2.3–3.5)
GLUCOSE SERPL-MCNC: 98 MG/DL (ref 70–99)
HCT VFR BLD AUTO: 41.4 % (ref 35.8–46.3)
HGB BLD-MCNC: 13.5 G/DL (ref 11.7–15.4)
IMM GRANULOCYTES # BLD AUTO: 0 K/UL (ref 0–0.5)
IMM GRANULOCYTES NFR BLD AUTO: 0 % (ref 0–5)
IRON SATN MFR SERPL: 23 % (ref 20–50)
IRON SERPL-MCNC: 62 UG/DL (ref 35–100)
LYMPHOCYTES # BLD: 1.7 K/UL (ref 0.5–4.6)
LYMPHOCYTES NFR BLD: 25 % (ref 13–44)
MCH RBC QN AUTO: 29.9 PG (ref 26.1–32.9)
MCHC RBC AUTO-ENTMCNC: 32.6 G/DL (ref 31.4–35)
MCV RBC AUTO: 91.6 FL (ref 82–102)
MONOCYTES # BLD: 0.4 K/UL (ref 0.1–1.3)
MONOCYTES NFR BLD: 7 % (ref 4–12)
NEUTS SEG # BLD: 4.2 K/UL (ref 1.7–8.2)
NEUTS SEG NFR BLD: 65 % (ref 43–78)
NRBC # BLD: 0 K/UL (ref 0–0.2)
PLATELET # BLD AUTO: 199 K/UL (ref 150–450)
PMV BLD AUTO: 11.8 FL (ref 9.4–12.3)
POTASSIUM SERPL-SCNC: 4.3 MMOL/L (ref 3.5–5.1)
PROT SERPL-MCNC: 6.7 G/DL (ref 6.3–8.2)
RBC # BLD AUTO: 4.52 M/UL (ref 4.05–5.2)
SODIUM SERPL-SCNC: 140 MMOL/L (ref 136–145)
TIBC SERPL-MCNC: 268 UG/DL (ref 240–450)
UIBC SERPL-MCNC: 206 UG/DL (ref 112–347)
WBC # BLD AUTO: 6.5 K/UL (ref 4.3–11.1)

## 2024-05-30 PROCEDURE — 99214 OFFICE O/P EST MOD 30 MIN: CPT | Performed by: NURSE PRACTITIONER

## 2024-05-30 PROCEDURE — 36415 COLL VENOUS BLD VENIPUNCTURE: CPT

## 2024-05-30 PROCEDURE — 80053 COMPREHEN METABOLIC PANEL: CPT

## 2024-05-30 PROCEDURE — 83550 IRON BINDING TEST: CPT

## 2024-05-30 PROCEDURE — 85025 COMPLETE CBC W/AUTO DIFF WBC: CPT

## 2024-05-30 PROCEDURE — 82728 ASSAY OF FERRITIN: CPT

## 2024-05-30 PROCEDURE — 83540 ASSAY OF IRON: CPT

## 2024-05-30 ASSESSMENT — PATIENT HEALTH QUESTIONNAIRE - PHQ9
SUM OF ALL RESPONSES TO PHQ9 QUESTIONS 1 & 2: 0
SUM OF ALL RESPONSES TO PHQ QUESTIONS 1-9: 0
SUM OF ALL RESPONSES TO PHQ QUESTIONS 1-9: 0
2. FEELING DOWN, DEPRESSED OR HOPELESS: NOT AT ALL
1. LITTLE INTEREST OR PLEASURE IN DOING THINGS: NOT AT ALL
SUM OF ALL RESPONSES TO PHQ QUESTIONS 1-9: 0
SUM OF ALL RESPONSES TO PHQ QUESTIONS 1-9: 0

## 2024-05-30 NOTE — PROGRESS NOTES
Data Source: Patient, Epic record.    05/30/24     Janet Mckinley [id number]    44 y.o.       Patient Encounter: [facility] Visit      Heme Diagnosis:  BARBER  Heme History (Copied from prior):   Janet Mckinley is a 38 y.o. female h/o migraines, chronic fatigue, fibromyalgia, neuropathy, being referred for anemia.  Ms. Mckinley presented to Dr. Siddiqui on 11/6/2018  to establish care.  Lab work completed in July by previous primary care was evaluated and revealed her hemoglobin was 9.4.  According to [facility] she was unable to be reached regarding her lab work.  There is mention of complaints of fatigue at that time.    She was diagnosed with fibromyalgia about 6 years ago, however has not seen a rheumatologist or neurologist. Has had multiple courses of PT including the fibromyalgia program. Has done water PT. Saw Dr. Ni who gave her multiple injections with no benefit. NCS apparently showed neuropathy. She is on Lyrica and Cymbalta.   .   States that her periods are sometimes heavy. Has been on iron supplements in the past but they cause constipation. Most recent hgb was 9.4.     ASSESSMENT:  Mixed nutritional deficiency with symptomatic iron deficiency and malabsorption  1) IV iron-se fully addressed  -iron stores normal, hgb stable  -repeat in 6 months     12/17/19 injectafer x 2; follow up 1 year     2) Oral folic acid 1mg and b12 1000mcg and repeat in 3mths  -non compliance, continue oral meds and rerefer to GI for scope     3) refer to GI  Will need further eval if does not respond to po as gastric sleeve explains BARBER but not folic acid and low normal b12  Further anemia work up pending-all work up negative  No improvement in fatigue, b12 low so start b12  RTC in 12 months     11/12/20  Not taking B12  Did not go to GI  States her tingling/m-s symptoms are worse  Sent here early b/o abnormal alb, globulin but normal TP and has had 3+ years of mild low alb and elevated

## 2024-06-08 DIAGNOSIS — M79.7 FIBROMYALGIA: ICD-10-CM

## 2024-06-10 RX ORDER — PREGABALIN 150 MG/1
CAPSULE ORAL
Qty: 90 CAPSULE | Refills: 0 | Status: SHIPPED | OUTPATIENT
Start: 2024-06-10 | End: 2024-07-11

## 2024-06-20 ENCOUNTER — TELEPHONE (OUTPATIENT)
Dept: INTERNAL MEDICINE CLINIC | Facility: CLINIC | Age: 44
End: 2024-06-20

## 2024-06-26 ENCOUNTER — TELEPHONE (OUTPATIENT)
Dept: INTERNAL MEDICINE CLINIC | Facility: CLINIC | Age: 44
End: 2024-06-26

## 2024-06-27 ENCOUNTER — OFFICE VISIT (OUTPATIENT)
Dept: INTERNAL MEDICINE CLINIC | Facility: CLINIC | Age: 44
End: 2024-06-27
Payer: COMMERCIAL

## 2024-06-27 VITALS
DIASTOLIC BLOOD PRESSURE: 74 MMHG | BODY MASS INDEX: 42.91 KG/M2 | SYSTOLIC BLOOD PRESSURE: 122 MMHG | HEART RATE: 91 BPM | OXYGEN SATURATION: 97 % | WEIGHT: 250 LBS

## 2024-06-27 DIAGNOSIS — E53.8 VITAMIN B12 DEFICIENCY: ICD-10-CM

## 2024-06-27 DIAGNOSIS — M79.7 FIBROMYALGIA: ICD-10-CM

## 2024-06-27 PROCEDURE — 99213 OFFICE O/P EST LOW 20 MIN: CPT | Performed by: STUDENT IN AN ORGANIZED HEALTH CARE EDUCATION/TRAINING PROGRAM

## 2024-06-27 RX ORDER — CYANOCOBALAMIN 1000 UG/ML
INJECTION, SOLUTION INTRAMUSCULAR; SUBCUTANEOUS
Qty: 1 ML | Refills: 5 | Status: SHIPPED | OUTPATIENT
Start: 2024-06-27

## 2024-06-27 RX ORDER — NEEDLES, FILTER 19GX1 1/2"
NEEDLE, DISPOSABLE MISCELLANEOUS
COMMUNITY
Start: 2024-05-07

## 2024-06-27 RX ORDER — DULOXETIN HYDROCHLORIDE 60 MG/1
CAPSULE, DELAYED RELEASE ORAL
Qty: 90 CAPSULE | Refills: 1 | Status: SHIPPED | OUTPATIENT
Start: 2024-06-27

## 2024-06-27 RX ORDER — PREGABALIN 150 MG/1
CAPSULE ORAL
Qty: 90 CAPSULE | Refills: 1 | Status: SHIPPED | OUTPATIENT
Start: 2024-07-28 | End: 2024-08-29

## 2024-06-27 RX ORDER — CITALOPRAM 40 MG/1
TABLET ORAL
Qty: 90 TABLET | Refills: 1 | Status: SHIPPED | OUTPATIENT
Start: 2024-06-27

## 2024-06-27 RX ORDER — LEVOTHYROXINE SODIUM 88 UG/1
TABLET ORAL
Qty: 90 TABLET | Refills: 1 | Status: SHIPPED | OUTPATIENT
Start: 2024-06-27

## 2024-06-27 ASSESSMENT — ENCOUNTER SYMPTOMS
NAUSEA: 0
COLOR CHANGE: 0
COUGH: 0
CHEST TIGHTNESS: 0
ABDOMINAL PAIN: 0
SORE THROAT: 0
VOMITING: 0
CONSTIPATION: 0
SHORTNESS OF BREATH: 0
BACK PAIN: 0
DIARRHEA: 0
SINUS PAIN: 0
BLOOD IN STOOL: 0
ABDOMINAL DISTENTION: 0
SINUS PRESSURE: 0

## 2024-06-27 NOTE — PROGRESS NOTES
HPI     Past Medical History, Past Surgical History, Family history, Social History, and Medications were all reviewed with the patient today and updated as necessary.       Patient here for refills for chronic conditions ROS negative      Current Outpatient Medications   Medication Sig Dispense Refill    BD INTEGRA SYRINGE 25G X 1\" 3 ML MISC TO BE USED WITH MONTHLY VITAMIN B12 SHOTS      pregabalin (LYRICA) 150 MG capsule TAKE 1 CAPSULE BY MOUTH THREE TIMES DAILY FOR 30 DAYS 90 capsule 0    Syringe, Disposable, 1 ML MISC To be used with monthly vitamin b12 shots 1 each 5    rimegepant sulfate (NURTEC) 75 MG TBDP Take 75 mg by mouth as needed (migraine) Take 1 tablet orally at onset of migraine, do not take more than 1 in 24 hours. 9 tablet 11    citalopram (CELEXA) 40 MG tablet TAKE 1 TABLET BY MOUTH ONCE DAILY 90 tablet 1    cyanocobalamin 1000 MCG/ML injection Administer every 28 days. 1 mL 5    DULoxetine (CYMBALTA) 60 MG extended release capsule TAKE 1 CAPSULE BY MOUTH ONCE DAILY 90 capsule 1    levothyroxine (SYNTHROID) 88 MCG tablet TAKE 1 TABLET BY MOUTH ONCE DAILY BEFORE BREAKFAST 90 tablet 1    topiramate (TOPAMAX) 25 MG tablet Take 3 tablets by mouth at bedtime 270 tablet 2    folic acid (FOLVITE) 1 MG tablet TAKE 1 TABLET BY MOUTH ONCE DAILY 30 tablet 11    albuterol sulfate HFA (PROVENTIL;VENTOLIN;PROAIR) 108 (90 Base) MCG/ACT inhaler INHALE 1 TO 2 PUFFS EVERY 4 TO 6 HOURS AS NEEDED FOR WHEEZE FOR UP TO 30 DAYS      valACYclovir (VALTREX) 1 g tablet as needed      Cholecalciferol (VITAMIN D3 PO) Take by mouth every evening      Multiple Vitamin (MULTIVITAMIN ADULT PO) Take by mouth daily      zinc gluconate 50 MG tablet Take 1 tablet by mouth daily (Patient not taking: Reported on 6/27/2024)      furosemide (LASIX) 20 MG tablet Take 1 tablet by mouth daily as needed (leg swelling) (Patient not taking: Reported on 6/27/2024) 30 tablet 1     No current facility-administered medications for this visit.

## 2024-07-03 DIAGNOSIS — E53.8 VITAMIN B12 DEFICIENCY: ICD-10-CM

## 2024-07-03 DIAGNOSIS — B00.9 HERPES: Primary | ICD-10-CM

## 2024-07-03 RX ORDER — VALACYCLOVIR HYDROCHLORIDE 500 MG/1
500 TABLET, FILM COATED ORAL 2 TIMES DAILY
Qty: 20 TABLET | Refills: 0 | Status: SHIPPED | OUTPATIENT
Start: 2024-07-03

## 2024-07-17 NOTE — PROGRESS NOTES
Procedure Date: 2024      Location: [facility]       Procedure: Left L4/5 Transforaminal RONALD       Time Out performed prior to start of the procedure:       Jim Morillo M.D.  performed the following reviews on Janet Mckinley 1980 prior to the start of the procedure:       patient was identified by name and     agreement on procedure being performed was verified   risks and benefits explained to patient by the provider  procedure site verified as Left  patient was positioned for comfort   consent signed and verified for procedure       Time:  8:46 AM        Procedure performed by:   Jim Morillo M.D.       Patient assisted by:   Erendira Flynn MA

## 2024-07-18 ENCOUNTER — OFFICE VISIT (OUTPATIENT)
Dept: ORTHOPEDIC SURGERY | Age: 44
End: 2024-07-18
Payer: COMMERCIAL

## 2024-07-18 DIAGNOSIS — M54.17 LUMBOSACRAL RADICULOPATHY: Primary | ICD-10-CM

## 2024-07-18 PROCEDURE — 64483 NJX AA&/STRD TFRM EPI L/S 1: CPT | Performed by: ANESTHESIOLOGY

## 2024-07-18 RX ORDER — DEXAMETHASONE SODIUM PHOSPHATE 10 MG/ML
10 INJECTION INTRAMUSCULAR; INTRAVENOUS ONCE
Status: COMPLETED | OUTPATIENT
Start: 2024-07-18 | End: 2024-07-18

## 2024-07-18 RX ADMIN — DEXAMETHASONE SODIUM PHOSPHATE 10 MG: 10 INJECTION INTRAMUSCULAR; INTRAVENOUS at 08:23

## 2024-07-18 NOTE — PROGRESS NOTES
NAME: Janet Mckinley  ID:[id number]   :1980    Location: [facility]    Procedure: left L4/5 Transforaminal Epidural Steroid Injection Under Fluoroscopic Imaging    Pre-op Diagnosis: 1. Lumbar Spinal Stenosis Neurogenic Claudication. 2. Lumbar Radicular Pain    Post-op Diagnosis: Same    Anesthesia: Local only     Complications: None    After confirming written and informed consent and discussing the risk, benefits and alternatives for the procedure, the patient had the correct site marked by the physician performing the procedure. The specific risks of bleeding and infection were discussed. The patient was taken to the fluoroscopy suite. A pulse oximeter was placed, and verbal and visual monitoring were maintained throughout the procedure.    The patient was then placed in the prone position. The skin overlying the lumbo-sacral spine was then prepped with chlorhexidine gluconate and a sterile drape was placed. Appropriate sterile attire was worn, including sterile gloves.. A time out was then performed involving the physician, radiation technologist and the patient.    Next, the anatomy of the lumbar spine was then identified using fluoroscopic guidance.    The left L4 pedicle was identified using winifred-posterior fluoroscopy. An oblique view was then obtained of the pedicle. The skin overlying the expected trajectory of the block needle was then anesthetized with 3 ml of 1% lidocaine. Next, a 22 G 5 inch Quincke needle was then advanced using a coaxial technique inferior to the 6 o'clock position on the cephalad pedicle, lateral to the approximate midpoint of the lamina. A lateral view was obtained, and intermittent fluoroscopy was utilized to advance the needle into the foramen.    Next, 2 ml of Omnipaque-240 was injected using real-time fluoroscopy to ensure non-vascular placement of the needle, and to confirm epidural spread of the contrast. Next, a 5ml solution containing dexamethasone 10 mg, 1ml of 1%

## 2024-07-25 ENCOUNTER — TELEPHONE (OUTPATIENT)
Age: 44
End: 2024-07-25

## 2024-07-25 ENCOUNTER — OFFICE VISIT (OUTPATIENT)
Dept: OBGYN CLINIC | Age: 44
End: 2024-07-25
Payer: COMMERCIAL

## 2024-07-25 ENCOUNTER — HOSPITAL ENCOUNTER (OUTPATIENT)
Dept: GENERAL RADIOLOGY | Age: 44
Discharge: HOME | End: 2024-07-25
Payer: COMMERCIAL

## 2024-07-25 ENCOUNTER — OFFICE VISIT (OUTPATIENT)
Age: 44
End: 2024-07-25
Payer: COMMERCIAL

## 2024-07-25 VITALS
SYSTOLIC BLOOD PRESSURE: 108 MMHG | DIASTOLIC BLOOD PRESSURE: 80 MMHG | WEIGHT: 251 LBS | HEIGHT: 64 IN | BODY MASS INDEX: 42.85 KG/M2

## 2024-07-25 DIAGNOSIS — S76.011D MUSCLE STRAIN OF RIGHT GLUTEAL REGION, SUBSEQUENT ENCOUNTER: ICD-10-CM

## 2024-07-25 DIAGNOSIS — M47.26 OTHER SPONDYLOSIS WITH RADICULOPATHY, LUMBAR REGION: ICD-10-CM

## 2024-07-25 DIAGNOSIS — Z12.4 SCREENING FOR MALIGNANT NEOPLASM OF CERVIX: ICD-10-CM

## 2024-07-25 DIAGNOSIS — B37.2 YEAST INFECTION OF THE SKIN: ICD-10-CM

## 2024-07-25 DIAGNOSIS — B00.9 HERPES: ICD-10-CM

## 2024-07-25 DIAGNOSIS — Z01.419 WELL WOMAN EXAM: Primary | ICD-10-CM

## 2024-07-25 PROCEDURE — 73502 X-RAY EXAM HIP UNI 2-3 VIEWS: CPT

## 2024-07-25 PROCEDURE — 99459 PELVIC EXAMINATION: CPT | Performed by: OBSTETRICS & GYNECOLOGY

## 2024-07-25 PROCEDURE — 99214 OFFICE O/P EST MOD 30 MIN: CPT | Performed by: PHYSICIAN ASSISTANT

## 2024-07-25 PROCEDURE — 99396 PREV VISIT EST AGE 40-64: CPT | Performed by: OBSTETRICS & GYNECOLOGY

## 2024-07-25 RX ORDER — FLUCONAZOLE 150 MG/1
TABLET ORAL
Qty: 3 TABLET | Refills: 5 | Status: SHIPPED | OUTPATIENT
Start: 2024-07-25

## 2024-07-25 RX ORDER — DICLOFENAC SODIUM 75 MG/1
75 TABLET, DELAYED RELEASE ORAL 2 TIMES DAILY PRN
Qty: 60 TABLET | Refills: 2 | Status: SHIPPED | OUTPATIENT
Start: 2024-07-25 | End: 2024-10-23

## 2024-07-25 RX ORDER — VALACYCLOVIR HYDROCHLORIDE 500 MG/1
500 TABLET, FILM COATED ORAL 2 TIMES DAILY
Qty: 90 TABLET | Refills: 3 | Status: SHIPPED | OUTPATIENT
Start: 2024-07-25

## 2024-07-25 ASSESSMENT — ENCOUNTER SYMPTOMS
ABDOMINAL PAIN: 0
SHORTNESS OF BREATH: 0
EYES NEGATIVE: 1
ALLERGIC/IMMUNOLOGIC NEGATIVE: 1

## 2024-07-25 NOTE — PROGRESS NOTES
RONALD)      Janet Mckinley is a 44 y.o. female [facility] for the following diagnoses:    Diagnosis:  1. Other spondylosis with radiculopathy, lumbar region    2. Muscle strain of right gluteal region, subsequent encounter          Subjective:      HPI:  Ms. Mckinley is seen in consultation at the request of No ref. provider found for evaluation and recommendations regarding the above diagnoses and the below HPI.    HPI on07/25/24: Ms Mckinley is a new patient follow and follow up to  RONALD L4/5 and L5/S1. She previously was getting IL ESIs with DR Sutton which worked for years. She says about 2.5 weeks ago, she was trying to walk quickly and since then, she has had extreme pain in her R side in the groin, gluteal and lateral hip. The R leg pain does not go to her foot. The L leg pain continues to go to her foot on the L side. She was walking on mostly level ground. She admits she is still smoking but has lost over 50 lbs. She has not had PT on her back recently. She did it prior to her MRI in 2022 but it did not help then. She did ask for something for pain. She says her FMS related meds are not helping her leg. We talked about radicular pain responding only to medicine for neuropathic pain. For her R hip/gluteal strain, I will provide Diclofenac orally to trial for short period of time. This is not to be a daily medicine and is to be used PRN with food.     5/17/24: 44-year-old female presents for evaluation treatment of low back and left leg pain.  Patient reports pain has been present for several years now without specific inciting event.  Patient points to history of fibromyalgia degenerative disc disease without inciting event.  Pain although present has been gradually increasing over the last several months.  Describes pain left-sided predominant low back rating into the left buttock left hip lateral leg down the level of the knee and then eventually coursing over the anterior

## 2024-07-25 NOTE — PATIENT INSTRUCTIONS
knee to the starting position.  Repeat 2 to 4 times.  Switch legs and repeat steps 1 through 5, even if only one hip is sore.  To get more stretch, put your other leg flat on the floor while pulling your knee to your chest.  Clamshell    Lie on your side, with your affected hip on top. Keep your feet and knees together and your knees bent.  Raise your top knee, but keep your feet together. Do not let your hips roll back. Your legs should open up like a clamshell.  Hold for 6 seconds.  Slowly lower your knee back down. Rest for 10 seconds.  Repeat 8 to 12 times.  Switch legs and repeat steps 1 through 5, even if only one hip is sore.  Follow-up care is a key part of your treatment and safety. Be sure to make and go to all appointments, and call your doctor if you are having problems. It's also a good idea to know your test results and keep a list of the medicines you take.  Current as of: July 17, 2023  Content Version: 14.1  © 2006-2024 [employer].   Care instructions adapted under license by [employer]. If you have questions about a medical condition or this instruction, always ask your healthcare professional. [employer] disclaims any warranty or liability for your use of this information.

## 2024-07-25 NOTE — PROGRESS NOTES
HPI    Janet Mckinley is a 44 y.o. female seen for annual GYN exam.   She has a rash that is very itchy involving the entire vulvar area and groin    Past Medical History, Past Surgical History, Family history, Social History, and Medications were all reviewed with the patient today and updated as necessary.     Current Outpatient Medications   Medication Sig    valACYclovir (VALTREX) 500 MG tablet Take 1 tablet by mouth 2 times daily    fluconazole (DIFLUCAN) 150 MG tablet Take one tablet every 3 days    nystatin-triamcinolone (MYCOLOG II) 256886-7.1 UNIT/GM-% cream Apply topically 2 times daily.    Syringe, Disposable, 1 ML MISC To be used with monthly vitamin b12 shots    BD INTEGRA SYRINGE 25G X 1\" 3 ML MISC TO BE USED WITH MONTHLY VITAMIN B12 SHOTS    citalopram (CELEXA) 40 MG tablet TAKE 1 TABLET BY MOUTH ONCE DAILY    cyanocobalamin 1000 MCG/ML injection Administer every 28 days.    DULoxetine (CYMBALTA) 60 MG extended release capsule TAKE 1 CAPSULE BY MOUTH ONCE DAILY    levothyroxine (SYNTHROID) 88 MCG tablet TAKE 1 TABLET BY MOUTH ONCE DAILY BEFORE BREAKFAST    [START ON 7/28/2024] pregabalin (LYRICA) 150 MG capsule TAKE 1 CAPSULE BY MOUTH THREE TIMES DAILY FOR 30 DAYS    rimegepant sulfate (NURTEC) 75 MG TBDP Take 75 mg by mouth as needed (migraine) Take 1 tablet orally at onset of migraine, do not take more than 1 in 24 hours.    topiramate (TOPAMAX) 25 MG tablet Take 3 tablets by mouth at bedtime    folic acid (FOLVITE) 1 MG tablet TAKE 1 TABLET BY MOUTH ONCE DAILY    albuterol sulfate HFA (PROVENTIL;VENTOLIN;PROAIR) 108 (90 Base) MCG/ACT inhaler INHALE 1 TO 2 PUFFS EVERY 4 TO 6 HOURS AS NEEDED FOR WHEEZE FOR UP TO 30 DAYS    Multiple Vitamin (MULTIVITAMIN ADULT PO) Take by mouth daily    furosemide (LASIX) 20 MG tablet Take 1 tablet by mouth daily as needed (leg swelling) (Patient not taking: Reported on 7/25/2024)    Cholecalciferol (VITAMIN D3 PO) Take by mouth every evening (Patient not

## 2024-07-25 NOTE — TELEPHONE ENCOUNTER
Please let patient know xray looks good. No obvious issues that are bone related. Joint space looks good. We can refer to PT if pain is persisting and not responsive to the Diclofenac.

## 2024-08-01 LAB
COLLECTION METHOD: NORMAL
CYTOLOGIST CVX/VAG CYTO: NORMAL
CYTOLOGY CVX/VAG DOC THIN PREP: NORMAL
DATE OF LMP: NORMAL
HPV REFLEX: NORMAL
Lab: NORMAL
PAP SOURCE: NORMAL
PATH REPORT.FINAL DX SPEC: NORMAL
STAT OF ADQ CVX/VAG CYTO-IMP: NORMAL

## 2024-08-06 ENCOUNTER — HOSPITAL ENCOUNTER (OUTPATIENT)
Dept: MRI IMAGING | Age: 44
Discharge: HOME | End: 2024-08-09
Payer: COMMERCIAL

## 2024-08-06 ENCOUNTER — TELEPHONE (OUTPATIENT)
Age: 44
End: 2024-08-06

## 2024-08-06 DIAGNOSIS — M47.26 OTHER SPONDYLOSIS WITH RADICULOPATHY, LUMBAR REGION: ICD-10-CM

## 2024-08-06 DIAGNOSIS — M47.26 OTHER SPONDYLOSIS WITH RADICULOPATHY, LUMBAR REGION: Primary | ICD-10-CM

## 2024-08-06 PROCEDURE — 72148 MRI LUMBAR SPINE W/O DYE: CPT

## 2024-08-06 NOTE — TELEPHONE ENCOUNTER
Recommend referral to ortho spine with Dr Katz or Daniela to discuss surgical options for LLE pain. Updated MRI shows worsening stenosis at L5/S1 - failed RONALD. Thanks!

## 2024-08-14 ENCOUNTER — OFFICE VISIT (OUTPATIENT)
Age: 44
End: 2024-08-14
Payer: COMMERCIAL

## 2024-08-14 VITALS — HEIGHT: 65 IN | WEIGHT: 254.4 LBS | BODY MASS INDEX: 42.38 KG/M2

## 2024-08-14 DIAGNOSIS — M54.50 LOW BACK PAIN, UNSPECIFIED BACK PAIN LATERALITY, UNSPECIFIED CHRONICITY, UNSPECIFIED WHETHER SCIATICA PRESENT: Primary | ICD-10-CM

## 2024-08-14 DIAGNOSIS — M54.16 LUMBAR RADICULOPATHY: ICD-10-CM

## 2024-08-14 PROCEDURE — 99204 OFFICE O/P NEW MOD 45 MIN: CPT | Performed by: ORTHOPAEDIC SURGERY

## 2024-08-14 NOTE — PROGRESS NOTES
Name: Janet Mckinley  YOB: 1980  Gender: female  MRN: [id number]  Age: 44 y.o.      Chief Complaint: Back pain and left leg pain    History of Present Illness:      This is a very pleasant 44 y.o. female who presents with a acute on chronic history of low back pain with radiation to left leg.  States pain that radiates down her left leg radiates from her buttock to the lateral thigh and calf to the dorsum of her foot.  She has had injections in the past which provided some relief.  She also did physical therapy in the past for her low back but nothing recently.  She was seen by pain management who felt that they did not have much to offer her.  She has been dealing this for at least 2 years and is worsened.      Medications:       Current Outpatient Medications:     valACYclovir (VALTREX) 500 MG tablet, Take 1 tablet by mouth 2 times daily, Disp: 90 tablet, Rfl: 3    fluconazole (DIFLUCAN) 150 MG tablet, Take one tablet every 3 days, Disp: 3 tablet, Rfl: 5    nystatin-triamcinolone (MYCOLOG II) 989080-2.1 UNIT/GM-% cream, Apply topically 2 times daily., Disp: 60 g, Rfl: 5    diclofenac (VOLTAREN) 75 MG EC tablet, Take 1 tablet by mouth 2 times daily as needed for Pain TAKE WITH FOOD, Disp: 60 tablet, Rfl: 2    Syringe, Disposable, 1 ML MISC, To be used with monthly vitamin b12 shots, Disp: 12 each, Rfl: 1    BD INTEGRA SYRINGE 25G X 1\" 3 ML MISC, TO BE USED WITH MONTHLY VITAMIN B12 SHOTS, Disp: , Rfl:     citalopram (CELEXA) 40 MG tablet, TAKE 1 TABLET BY MOUTH ONCE DAILY, Disp: 90 tablet, Rfl: 1    cyanocobalamin 1000 MCG/ML injection, Administer every 28 days., Disp: 1 mL, Rfl: 5    DULoxetine (CYMBALTA) 60 MG extended release capsule, TAKE 1 CAPSULE BY MOUTH ONCE DAILY, Disp: 90 capsule, Rfl: 1    levothyroxine (SYNTHROID) 88 MCG tablet, TAKE 1 TABLET BY MOUTH ONCE DAILY BEFORE BREAKFAST, Disp: 90 tablet, Rfl: 1    pregabalin (LYRICA) 150 MG capsule, TAKE 1 CAPSULE BY MOUTH THREE TIMES

## 2024-08-15 ENCOUNTER — PATIENT MESSAGE (OUTPATIENT)
Age: 44
End: 2024-08-15

## 2024-08-15 ENCOUNTER — TELEPHONE (OUTPATIENT)
Age: 44
End: 2024-08-15

## 2024-08-15 DIAGNOSIS — S76.011D MUSCLE STRAIN OF RIGHT GLUTEAL REGION, SUBSEQUENT ENCOUNTER: Primary | ICD-10-CM

## 2024-08-16 RX ORDER — METHOCARBAMOL 500 MG/1
500 TABLET, FILM COATED ORAL 2 TIMES DAILY PRN
Qty: 30 TABLET | Refills: 0 | Status: SHIPPED | OUTPATIENT
Start: 2024-08-16 | End: 2024-08-31

## 2024-08-21 ENCOUNTER — OFFICE VISIT (OUTPATIENT)
Age: 44
End: 2024-08-21
Payer: COMMERCIAL

## 2024-08-21 DIAGNOSIS — S76.011D MUSCLE STRAIN OF RIGHT GLUTEAL REGION, SUBSEQUENT ENCOUNTER: Primary | ICD-10-CM

## 2024-08-21 PROCEDURE — 99214 OFFICE O/P EST MOD 30 MIN: CPT | Performed by: PHYSICIAN ASSISTANT

## 2024-08-21 RX ORDER — TIZANIDINE 2 MG/1
2-4 TABLET ORAL 3 TIMES DAILY PRN
Qty: 90 TABLET | Refills: 1 | Status: SHIPPED | OUTPATIENT
Start: 2024-08-21 | End: 2024-09-20

## 2024-08-21 ASSESSMENT — ENCOUNTER SYMPTOMS
ABDOMINAL PAIN: 0
ALLERGIC/IMMUNOLOGIC NEGATIVE: 1
SHORTNESS OF BREATH: 0
EYES NEGATIVE: 1

## 2024-08-21 NOTE — PROGRESS NOTES
Ms Mckinley is a f/u for lumbar  MRI which was updated to check for worsening stenosis. It showed similar Grade 1 retrolisthesis of L4 on L5 with a large central left paracentral disc extrusion. At L5/S1 that was increasing severe L foraminal stenosis which correlates precisely with her gluteal pain complaints. She met back with Dr Suarez who recommended PT and smoking cessation.  She has since called back and asked for a muscle relaxer for her back pain.

## 2024-08-21 NOTE — PROGRESS NOTES
Chronic Pain Consult Note      Plan:     A comprehensive pain management plan may consist of the following: Testing, Therapy, Medications, Interventions, Consults, and Follow up.    Lumbar radiculopathy  S/p left L4-5 and L5-S1 T RONALD with 0% pain relief, therefore no repeat.  S/p repeat lumbar MRI with worsening of L sided L5/S1 stenosis. She has met with surgery who required PT along with smoking cessation.    Gluteal muscle strain, R side  D/c Diclofenac due to ineffectiveness   D/c Robaxin - caused hot flashes   Xray R hip reviewed and negative for OA  Encouraged PT as already discussed with Dr Suarez. Consider MRI of R hip if not improving.   Trial Tizanidine 2mg 1-2 tablets PO QHS.   Lumbar degenerative disc disease  Plan as above  Chronic  pain syndrome  Encourage continuation active lifestyle  Spent considerable time discussing fibromyalgia condition and treatment  Patient currently on optimal medication regimen including Lyrica, Celexa, Cymbalta    General Recommendations: The pain condition that the patient suffers from is best treated with a multidisciplinary approach that involves an increase in physical activity to prevent de-conditioning and worsening of the pain cycle, as well as psychological counseling (formal and/or informal) to address the co morbid psychological effects of pain. Treatment will often involve judicious use of pain medications and interventional procedures to decrease the pain, allowing the patient to participate in the physical activity that will ultimately produce long-lasting pain reductions. The goal of the multidisciplinary approach is to return the patient to a higher level of overall function and to restore their ability to perform activities of daily living.      Referring Provider: No ref. provider found  Assessment:      Chief Complaint: Follow-up (MRI f/u)      Janet Mckinley is a 44 y.o. female [facility] for the following

## 2024-09-06 ENCOUNTER — APPOINTMENT (OUTPATIENT)
Dept: PHYSICAL THERAPY | Age: 44
End: 2024-09-06
Attending: ORTHOPAEDIC SURGERY
Payer: COMMERCIAL

## 2024-09-11 ENCOUNTER — HOSPITAL ENCOUNTER (OUTPATIENT)
Dept: PHYSICAL THERAPY | Age: 44
Setting detail: RECURRING SERIES
Discharge: HOME | End: 2024-09-14
Attending: ORTHOPAEDIC SURGERY
Payer: COMMERCIAL

## 2024-09-11 DIAGNOSIS — M54.16 RADICULOPATHY, LUMBAR REGION: Primary | ICD-10-CM

## 2024-09-11 DIAGNOSIS — M62.81 MUSCLE WEAKNESS (GENERALIZED): ICD-10-CM

## 2024-09-11 DIAGNOSIS — R26.2 DIFFICULTY IN WALKING, NOT ELSEWHERE CLASSIFIED: ICD-10-CM

## 2024-09-11 PROCEDURE — 97110 THERAPEUTIC EXERCISES: CPT

## 2024-09-11 PROCEDURE — 97161 PT EVAL LOW COMPLEX 20 MIN: CPT

## 2024-09-11 ASSESSMENT — PAIN SCALES - GENERAL: PAINLEVEL_OUTOF10: 6

## 2024-09-18 ENCOUNTER — HOSPITAL ENCOUNTER (OUTPATIENT)
Dept: PHYSICAL THERAPY | Age: 44
Setting detail: RECURRING SERIES
Discharge: HOME | End: 2024-09-21
Attending: ORTHOPAEDIC SURGERY
Payer: COMMERCIAL

## 2024-09-18 PROCEDURE — 97110 THERAPEUTIC EXERCISES: CPT

## 2024-09-18 ASSESSMENT — PAIN SCALES - GENERAL: PAINLEVEL_OUTOF10: 10

## 2024-09-25 ENCOUNTER — APPOINTMENT (OUTPATIENT)
Dept: PHYSICAL THERAPY | Age: 44
End: 2024-09-25
Attending: ORTHOPAEDIC SURGERY
Payer: COMMERCIAL

## 2024-10-01 DIAGNOSIS — M47.26 OTHER SPONDYLOSIS WITH RADICULOPATHY, LUMBAR REGION: Primary | ICD-10-CM

## 2024-10-01 NOTE — TELEPHONE ENCOUNTER
Pt lvm stating that she no longer has a pcp.  She is wanting to know if you will take over this medication for her.  Next appointment on 10/17/24.  Last filled on 7/27/24 - lyrica 150mg TID.  Please advise

## 2024-10-01 NOTE — TELEPHONE ENCOUNTER
I am okay to write Lyrica 150mg TID for patient - she should find a new PCP though as soon as she can.

## 2024-10-02 ENCOUNTER — APPOINTMENT (OUTPATIENT)
Dept: PHYSICAL THERAPY | Age: 44
End: 2024-10-02
Attending: ORTHOPAEDIC SURGERY
Payer: COMMERCIAL

## 2024-10-03 RX ORDER — PREGABALIN 150 MG/1
150 CAPSULE ORAL 3 TIMES DAILY
Qty: 90 CAPSULE | Refills: 2 | Status: SHIPPED | OUTPATIENT
Start: 2024-10-03 | End: 2025-01-01

## 2024-10-07 ENCOUNTER — TELEPHONE (OUTPATIENT)
Dept: INTERNAL MEDICINE CLINIC | Facility: CLINIC | Age: 44
End: 2024-10-07

## 2024-10-07 NOTE — TELEPHONE ENCOUNTER
----- Message from Mala NAVA sent at 10/7/2024 11:39 AM EDT -----  Regarding: [facility] Appointment Request  [facility] Appointment Request    Patient needs appointment for [facility] Appointment Type: New to Provider.    Patient Requested Dates(s): any available for this month  Patient Requested Time: any time  Provider Name: Jackson Mclain   Or Michele Moore  Reason for Appointment Request: New Patient - No appointments available during search  --------------------------------------------------------------------------------------------------------------------------    Relationship to Patient: Self     Call Back Information: OK to leave message on voicemail  Preferred Call Back Number: Phone   [phone]

## 2024-10-07 NOTE — TELEPHONE ENCOUNTER
Please contact pt to schedule new pt appt with either Dr. Head or Dr. Moore as Dr. Mclain is not accepting new pt's. Thank you.

## 2024-10-10 ENCOUNTER — HOSPITAL ENCOUNTER (OUTPATIENT)
Dept: PHYSICAL THERAPY | Age: 44
Setting detail: RECURRING SERIES
Discharge: HOME | End: 2024-10-13
Attending: ORTHOPAEDIC SURGERY
Payer: COMMERCIAL

## 2024-10-10 PROCEDURE — 97110 THERAPEUTIC EXERCISES: CPT

## 2024-10-10 ASSESSMENT — PAIN SCALES - GENERAL: PAINLEVEL_OUTOF10: 10

## 2024-10-10 NOTE — PROGRESS NOTES
Janet Acaciayonatan Mckinley  : 1980  Primary: [facility] (Medicaid Managed)  Secondary:  [facility]  [address]  [address]  [facility] [address]  Phone: [phone]  Fax: [phone] Plan Frequency: 2 x week for 6 weeks    Plan of Care/Certification Expiration Date: 11/10/24        Plan of Care/Certification Expiration Date:  Plan of Care/Certification Expiration Date: 11/10/24    Frequency/Duration:   Plan Frequency: 2 x week for 6 weeks      Time In/Out:   Time In: 1405  Time Out: 1430      PT Visit Info:    Total # of Visits Approved: 27  Total # of Visits to Date: 3      Visit Count:  3    OUTPATIENT PHYSICAL THERAPY:   Treatment Note 10/10/2024       Episode  (Low back pain)               Treatment Diagnosis:    Radiculopathy, lumbar region  Muscle weakness (generalized)  Difficulty in walking, not elsewhere classified  Medical/Referring Diagnosis:    Low back pain, unspecified back pain laterality, unspecified chronicity, unspecified whether sciatica present [M54.50]  Lumbar radiculopathy [M54.16]      Referring Physician:  Rashard Suarez MD MD Orders:  PT Eval and Treat   Return MD Appt:  2024   Date of Onset:  Onset Date: 23     Allergies:   Bismuth subsalicylate and Diphenhydramine-acetaminophen  Restrictions/Precautions:   None      Interventions Planned (Treatment may consist of any combination of the following):     See Assessment Note    Subjective Comments:   Patient with complaints ot pain in her upper back due to power steering going out in her car.  Low back and leg pain remains the same.  Arrived late for appointment  Initial Pain Level::     1010  Post Session Pain Level:     10   /10  Medications Last Reviewed:  10/10/2024  Updated Objective Findings:  None Today  Treatment   THERAPEUTIC EXERCISE: (23 minutes):    Exercises per grid below to improve mobility.  Required minimal verbal cues to promote proper body alignment and promote proper body

## 2024-10-17 ENCOUNTER — OFFICE VISIT (OUTPATIENT)
Age: 44
End: 2024-10-17
Payer: COMMERCIAL

## 2024-10-17 DIAGNOSIS — M47.26 OTHER SPONDYLOSIS WITH RADICULOPATHY, LUMBAR REGION: ICD-10-CM

## 2024-10-17 DIAGNOSIS — M79.7 FIBROMYALGIA: Primary | ICD-10-CM

## 2024-10-17 PROCEDURE — 99214 OFFICE O/P EST MOD 30 MIN: CPT | Performed by: PHYSICIAN ASSISTANT

## 2024-10-17 RX ORDER — PREGABALIN 150 MG/1
150 CAPSULE ORAL 3 TIMES DAILY
Qty: 90 CAPSULE | Refills: 0 | Status: SHIPPED | OUTPATIENT
Start: 2025-01-01 | End: 2025-04-01

## 2024-10-17 RX ORDER — DULOXETIN HYDROCHLORIDE 60 MG/1
CAPSULE, DELAYED RELEASE ORAL
Qty: 30 CAPSULE | Refills: 1 | Status: SHIPPED | OUTPATIENT
Start: 2024-10-17

## 2024-10-17 ASSESSMENT — ENCOUNTER SYMPTOMS
ALLERGIC/IMMUNOLOGIC NEGATIVE: 1
SHORTNESS OF BREATH: 0
ABDOMINAL PAIN: 0
EYES NEGATIVE: 1

## 2024-10-17 NOTE — PROGRESS NOTES
Chronic Pain Consult Note      Plan:     A comprehensive pain management plan may consist of the following: Testing, Therapy, Medications, Interventions, Consults, and Follow up.    Lumbar radiculopathy  S/p left L4-5 and L5-S1 T RONALD with 0% pain relief, therefore no repeat.  S/p repeat lumbar MRI with worsening of L sided L5/S1 stenosis. She has met with surgery who required PT along with smoking cessation.    PT is not helping her LLE symptoms.   Gluteal muscle strain, R side  D/c Diclofenac due to ineffectiveness   D/c Robaxin - caused hot flashes   Xray R hip reviewed and negative for OA  Hip has improved.    Continue prn Tizanidine. She has plenty of this left but would like a refill on file at the pharmacy.    Lumbar degenerative disc disease  Plan as above  Chronic  pain syndrome  Encourage continuation active lifestyle  Spent considerable time discussing fibromyalgia condition and treatment  I am OK to write Lyrica and Cymbalta until she can find internists office who may take those over.   Patient currently on optimal medication regimen including Lyrica, Celexa, Cymbalta  Tobacco Abuse  I am also willing to write her Nicotine lollipops through [facility] if she would like to use those to help stop smoking. She will consider it after the new year.     General Recommendations: The pain condition that the patient suffers from is best treated with a multidisciplinary approach that involves an increase in physical activity to prevent de-conditioning and worsening of the pain cycle, as well as psychological counseling (formal and/or informal) to address the co morbid psychological effects of pain. Treatment will often involve judicious use of pain medications and interventional procedures to decrease the pain, allowing the patient to participate in the physical activity that will ultimately produce long-lasting pain reductions. The goal of the multidisciplinary approach is to return the patient to a

## 2024-10-27 SDOH — HEALTH STABILITY: PHYSICAL HEALTH: ON AVERAGE, HOW MANY DAYS PER WEEK DO YOU ENGAGE IN MODERATE TO STRENUOUS EXERCISE (LIKE A BRISK WALK)?: 2 DAYS

## 2024-10-27 SDOH — HEALTH STABILITY: PHYSICAL HEALTH: ON AVERAGE, HOW MANY MINUTES DO YOU ENGAGE IN EXERCISE AT THIS LEVEL?: 30 MIN

## 2024-10-28 DIAGNOSIS — E53.8 VITAMIN B12 DEFICIENCY: ICD-10-CM

## 2024-10-30 ENCOUNTER — OFFICE VISIT (OUTPATIENT)
Dept: FAMILY MEDICINE CLINIC | Facility: CLINIC | Age: 44
End: 2024-10-30
Payer: COMMERCIAL

## 2024-10-30 VITALS
TEMPERATURE: 97.5 F | BODY MASS INDEX: 41.79 KG/M2 | OXYGEN SATURATION: 98 % | HEIGHT: 65 IN | WEIGHT: 250.8 LBS | RESPIRATION RATE: 16 BRPM | HEART RATE: 60 BPM | DIASTOLIC BLOOD PRESSURE: 67 MMHG | SYSTOLIC BLOOD PRESSURE: 111 MMHG

## 2024-10-30 DIAGNOSIS — F17.200 TOBACCO DEPENDENCE: ICD-10-CM

## 2024-10-30 DIAGNOSIS — Z98.84 H/O GASTRIC BYPASS: ICD-10-CM

## 2024-10-30 DIAGNOSIS — E53.8 VITAMIN B12 DEFICIENCY: ICD-10-CM

## 2024-10-30 DIAGNOSIS — D50.9 IRON DEFICIENCY ANEMIA, UNSPECIFIED IRON DEFICIENCY ANEMIA TYPE: ICD-10-CM

## 2024-10-30 DIAGNOSIS — N91.5 OLIGOMENORRHEA, UNSPECIFIED TYPE: ICD-10-CM

## 2024-10-30 DIAGNOSIS — M79.7 FIBROMYALGIA: Primary | ICD-10-CM

## 2024-10-30 PROCEDURE — 99203 OFFICE O/P NEW LOW 30 MIN: CPT | Performed by: FAMILY MEDICINE

## 2024-10-30 RX ORDER — CITALOPRAM HYDROBROMIDE 40 MG/1
TABLET ORAL
Qty: 90 TABLET | Refills: 1 | Status: SHIPPED | OUTPATIENT
Start: 2024-10-30

## 2024-10-30 RX ORDER — LEVOTHYROXINE SODIUM 88 UG/1
TABLET ORAL
Qty: 90 TABLET | Refills: 1 | Status: SHIPPED | OUTPATIENT
Start: 2024-10-30

## 2024-10-30 RX ORDER — CYANOCOBALAMIN 1000 UG/ML
INJECTION, SOLUTION INTRAMUSCULAR; SUBCUTANEOUS
Qty: 1 ML | Refills: 5 | Status: SHIPPED | OUTPATIENT
Start: 2024-10-30

## 2024-10-30 NOTE — PROGRESS NOTES
Subjective:   Janet Mckinley is a 44 y.o. female presents today with medical problems and to obtain refills if necessary, and they are also meeting me as their new physician for the first time as their initial visit today for really just trying to get into a PCP where she can establish.  She has no current complaints.  Needs a couple refills but labs are up-to-date.  She smokes about a half a pack of cigarettes a day.  For the past 8 years.  Had stopped for a while but prior to that it also smoked in her late 20s.    Does not use any alcohol except rarely during the holiday or special occasion.    She currently lives at home but takes care of her mother and another relative which does not leave a lot of time for her hobbies which had been reading and crafts.  She has a 20-year-old daughter.  So currently she is unemployed otherwise.    Allergies   Allergen Reactions    Bismuth Subsalicylate Nausea And Vomiting    Excedrin Extra Strength [Asa-Apap-Caff Buffered] Nausea And Vomiting     PMH, MEDS reviewed     Objective:   Blood pressure 111/67, pulse 60, temperature 97.5 °F (36.4 °C), resp. rate 16, height 1.638 m (5' 4.5\"), weight 113.8 kg (250 lb 12.8 oz), last menstrual period 04/20/2024, SpO2 98%.  General- Pleasant and no distress  Psych- alert and oriented to person, place and time  Mood and affect are appropriate to situation  Musculoskeletal - Gait and station examination reveals mid-position with no abnormalities.  Neurological- grossly intact.   rrr s mrg.   bcta    Assessment/Plan:     1. Fibromyalgia    2. Vitamin B12 deficiency    3. Iron deficiency anemia, unspecified iron deficiency anemia type    4. H/O gastric bypass    5. Oligomenorrhea, unspecified type    6. Tobacco dependence         1. Fibromyalgia  2. Vitamin B12 deficiency  -     Syringe, Disposable, 1 ML MISC; Disp-12 each, R-1, NormalTo be used with monthly vitamin b12 shots  -     cyanocobalamin 1000 MCG/ML injection; Administer

## 2024-11-20 ENCOUNTER — HOSPITAL ENCOUNTER (OUTPATIENT)
Dept: LAB | Age: 44
Discharge: HOME | End: 2024-11-20
Payer: COMMERCIAL

## 2024-11-20 ENCOUNTER — OFFICE VISIT (OUTPATIENT)
Dept: ONCOLOGY | Age: 44
End: 2024-11-20
Payer: COMMERCIAL

## 2024-11-20 VITALS
DIASTOLIC BLOOD PRESSURE: 85 MMHG | HEART RATE: 81 BPM | WEIGHT: 253.9 LBS | RESPIRATION RATE: 16 BRPM | TEMPERATURE: 97.7 F | OXYGEN SATURATION: 99 % | BODY MASS INDEX: 43.35 KG/M2 | HEIGHT: 64 IN | SYSTOLIC BLOOD PRESSURE: 138 MMHG

## 2024-11-20 DIAGNOSIS — Z86.39 HISTORY OF FOLIC ACID DEFICIENCY (NON-ANEMIC): ICD-10-CM

## 2024-11-20 DIAGNOSIS — D50.9 IRON DEFICIENCY ANEMIA, UNSPECIFIED IRON DEFICIENCY ANEMIA TYPE: ICD-10-CM

## 2024-11-20 DIAGNOSIS — E53.8 FOLIC ACID DEFICIENCY: ICD-10-CM

## 2024-11-20 DIAGNOSIS — R53.83 FATIGUE, UNSPECIFIED TYPE: Primary | ICD-10-CM

## 2024-11-20 DIAGNOSIS — E53.8 VITAMIN B12 DEFICIENCY: ICD-10-CM

## 2024-11-20 DIAGNOSIS — Z86.2 HISTORY OF ANEMIA DUE TO VITAMIN B12 DEFICIENCY: ICD-10-CM

## 2024-11-20 DIAGNOSIS — Z86.2 HISTORY OF IRON DEFICIENCY ANEMIA: ICD-10-CM

## 2024-11-20 LAB
ALBUMIN SERPL-MCNC: 3.5 G/DL (ref 3.5–5)
ALBUMIN/GLOB SERPL: 1 (ref 1–1.9)
ALP SERPL-CCNC: 78 U/L (ref 35–104)
ALT SERPL-CCNC: 9 U/L (ref 8–45)
ANION GAP SERPL CALC-SCNC: 7 MMOL/L (ref 7–16)
AST SERPL-CCNC: 18 U/L (ref 15–37)
BASOPHILS # BLD: 0 K/UL (ref 0–0.2)
BASOPHILS NFR BLD: 1 % (ref 0–2)
BILIRUB SERPL-MCNC: 0.3 MG/DL (ref 0–1.2)
BUN SERPL-MCNC: 9 MG/DL (ref 6–23)
CALCIUM SERPL-MCNC: 9.3 MG/DL (ref 8.8–10.2)
CHLORIDE SERPL-SCNC: 107 MMOL/L (ref 98–107)
CO2 SERPL-SCNC: 25 MMOL/L (ref 20–29)
CREAT SERPL-MCNC: 0.77 MG/DL (ref 0.6–1.1)
DIFFERENTIAL METHOD BLD: NORMAL
EOSINOPHIL # BLD: 0.1 K/UL (ref 0–0.8)
EOSINOPHIL NFR BLD: 2 % (ref 0.5–7.8)
ERYTHROCYTE [DISTWIDTH] IN BLOOD BY AUTOMATED COUNT: 13.9 % (ref 11.9–14.6)
FERRITIN SERPL-MCNC: 280 NG/ML (ref 8–388)
FOLATE SERPL-MCNC: 18.6 NG/ML (ref 3.1–17.5)
GLOBULIN SER CALC-MCNC: 3.4 G/DL (ref 2.3–3.5)
GLUCOSE SERPL-MCNC: 97 MG/DL (ref 70–99)
HCT VFR BLD AUTO: 41.6 % (ref 35.8–46.3)
HGB BLD-MCNC: 13.5 G/DL (ref 11.7–15.4)
IMM GRANULOCYTES # BLD AUTO: 0 K/UL (ref 0–0.5)
IMM GRANULOCYTES NFR BLD AUTO: 0 % (ref 0–5)
IRON SATN MFR SERPL: 20 % (ref 20–50)
IRON SERPL-MCNC: 46 UG/DL (ref 35–100)
LYMPHOCYTES # BLD: 2 K/UL (ref 0.5–4.6)
LYMPHOCYTES NFR BLD: 28 % (ref 13–44)
MCH RBC QN AUTO: 29.3 PG (ref 26.1–32.9)
MCHC RBC AUTO-ENTMCNC: 32.5 G/DL (ref 31.4–35)
MCV RBC AUTO: 90.4 FL (ref 82–102)
MONOCYTES # BLD: 0.5 K/UL (ref 0.1–1.3)
MONOCYTES NFR BLD: 6 % (ref 4–12)
NEUTS SEG # BLD: 4.5 K/UL (ref 1.7–8.2)
NEUTS SEG NFR BLD: 63 % (ref 43–78)
NRBC # BLD: 0 K/UL (ref 0–0.2)
PLATELET # BLD AUTO: 198 K/UL (ref 150–450)
PMV BLD AUTO: 11.5 FL (ref 9.4–12.3)
POTASSIUM SERPL-SCNC: 4.5 MMOL/L (ref 3.5–5.1)
PROT SERPL-MCNC: 6.9 G/DL (ref 6.3–8.2)
RBC # BLD AUTO: 4.6 M/UL (ref 4.05–5.2)
SODIUM SERPL-SCNC: 139 MMOL/L (ref 136–145)
TIBC SERPL-MCNC: 234 UG/DL (ref 240–450)
UIBC SERPL-MCNC: 188 UG/DL (ref 112–347)
VIT B12 SERPL-MCNC: 408 PG/ML (ref 193–986)
WBC # BLD AUTO: 7.1 K/UL (ref 4.3–11.1)

## 2024-11-20 PROCEDURE — 83550 IRON BINDING TEST: CPT

## 2024-11-20 PROCEDURE — 99214 OFFICE O/P EST MOD 30 MIN: CPT | Performed by: NURSE PRACTITIONER

## 2024-11-20 PROCEDURE — 80053 COMPREHEN METABOLIC PANEL: CPT

## 2024-11-20 PROCEDURE — 85025 COMPLETE CBC W/AUTO DIFF WBC: CPT

## 2024-11-20 PROCEDURE — 83540 ASSAY OF IRON: CPT

## 2024-11-20 PROCEDURE — 82728 ASSAY OF FERRITIN: CPT

## 2024-11-20 PROCEDURE — 36415 COLL VENOUS BLD VENIPUNCTURE: CPT

## 2024-11-20 PROCEDURE — 82607 VITAMIN B-12: CPT

## 2024-11-20 PROCEDURE — 82746 ASSAY OF FOLIC ACID SERUM: CPT

## 2024-11-20 ASSESSMENT — PATIENT HEALTH QUESTIONNAIRE - PHQ9
SUM OF ALL RESPONSES TO PHQ QUESTIONS 1-9: 0
1. LITTLE INTEREST OR PLEASURE IN DOING THINGS: NOT AT ALL
2. FEELING DOWN, DEPRESSED OR HOPELESS: NOT AT ALL
SUM OF ALL RESPONSES TO PHQ QUESTIONS 1-9: 0
SUM OF ALL RESPONSES TO PHQ9 QUESTIONS 1 & 2: 0

## 2024-11-20 NOTE — PROGRESS NOTES
Yes only.  Fatigue somewhat worse of late.  Blood work today without anemia, however iron stores following: Will repeat IV iron x2.  We will see her back in 6 months time.    5/3/2023: She is here for follow up for her iron, folate, and B12 deficiencies.  Menses are now irregular, only some spotting in February since November 2022.  Fatigue has increased the past few months.  Denies any ice cravings/PICA.  She continue to take folic acid daily as well as monthly B12 injections.  Labs reviewed, Hgb 13.3, TSAT 14%.  She would like to proceed with repeat Feraheme x 2.      11/6/2023: She returns today for follow-up.  Since last seen, she has been stable.  She has been having a bit more difficulty with migraines and as a result has had some of her medications changed.  She continues to menstruate but somewhat more erratically.  Occasionally these menses are quite light but on occasion they are also heavy.  When last seen, she was infused parenteral iron.  She is not certain that she feels as though she needs at this time.  Certainly her hemoglobin is quite reasonable today.  She has had no pica, restless legs or other iron deficiency related side effects.  She states she is quite diligent in her self injection of vitamin B12.    5/30/2024: She returns today for follow-up. She has been okay since last seen. She continues to deal with her chronic health issues like fibromyalgia, back pain, and migraines.  She continues monthly vitamin B12 injections and oral folic acid replacement. She has chronic fatigue which is unchanged as well as restless legs. She denies having any dark stools or bright red bleeding. Labs reviewed and excellent with normal Hgb and iron stores. Last month her B-12 was 313 - continue injections. Continue oral folic acid. Follow up in 6 months or sooner if needed.     11/20/2024: She returns today for follow-up regarding her history of folate, vitamin B12 and iron deficiency. She has been okay overall

## 2025-01-17 DIAGNOSIS — M47.26 OTHER SPONDYLOSIS WITH RADICULOPATHY, LUMBAR REGION: ICD-10-CM

## 2025-01-20 RX ORDER — PREGABALIN 150 MG/1
150 CAPSULE ORAL 3 TIMES DAILY
Qty: 90 CAPSULE | Refills: 2 | Status: SHIPPED | OUTPATIENT
Start: 2025-01-20 | End: 2025-04-20

## 2025-02-01 DIAGNOSIS — M79.7 FIBROMYALGIA: ICD-10-CM

## 2025-02-03 RX ORDER — DULOXETIN HYDROCHLORIDE 60 MG/1
CAPSULE, DELAYED RELEASE ORAL
Qty: 30 CAPSULE | Refills: 1 | Status: SHIPPED | OUTPATIENT
Start: 2025-02-03

## 2025-02-11 ENCOUNTER — OFFICE VISIT (OUTPATIENT)
Age: 45
End: 2025-02-11
Payer: COMMERCIAL

## 2025-02-11 DIAGNOSIS — M79.7 FIBROMYALGIA: Primary | ICD-10-CM

## 2025-02-11 DIAGNOSIS — M47.26 OTHER SPONDYLOSIS WITH RADICULOPATHY, LUMBAR REGION: ICD-10-CM

## 2025-02-11 PROCEDURE — 99214 OFFICE O/P EST MOD 30 MIN: CPT | Performed by: PHYSICIAN ASSISTANT

## 2025-02-11 ASSESSMENT — ENCOUNTER SYMPTOMS
EYES NEGATIVE: 1
ALLERGIC/IMMUNOLOGIC NEGATIVE: 1
SHORTNESS OF BREATH: 0
ABDOMINAL PAIN: 0

## 2025-02-11 NOTE — PROGRESS NOTES
Ms Mckinley is a 3 month f/u for chronic low back pain with lumbar radiculopathy. We are currently writing her Lyrica and Cymbalta for her FMS related symptoms. She is a surgical candidate but must quit smoking and complete PT. PT unfortunately has not helped her leg symptoms.   
Reported on 7/25/2024)      Multiple Vitamin (MULTIVITAMIN ADULT PO) Take by mouth daily       No facility-administered encounter medications on file as of 2/11/2025.          Review of Systems   Constitutional:  Negative for chills, fatigue, fever and unexpected weight change.   HENT:  Negative for congestion and ear pain.    Eyes: Negative.    Respiratory:  Negative for shortness of breath.    Cardiovascular:  Negative for chest pain.   Gastrointestinal:  Negative for abdominal pain.   Endocrine: Negative.    Genitourinary: Negative.    Skin:  Negative for rash.   Allergic/Immunologic: Negative.    Neurological:  Negative for dizziness.   Hematological: Negative.    Psychiatric/Behavioral: Negative.           All 11 systems reviewed and were negative.          The [place] database for controlled substance prescription monitoring was reviewed.    Date: February 11, 2025  Patient Name: Janet Mckinley  MRN:[id number]  PCP: No, Pcp    I personally performed the HPI, exam, and assessment/plan, verified the documentation and approve it is updated, accurate, and complete. Parts or the entirety of this document were transcribed utilizing voice recognition software. Transcription errors may be present.    Sarah Lau PA-C

## 2025-02-12 ASSESSMENT — ENCOUNTER SYMPTOMS
ALLERGIC/IMMUNOLOGIC NEGATIVE: 1
VOMITING: 1
EYE REDNESS: 0
NAUSEA: 1
PHOTOPHOBIA: 1
EYE PAIN: 0

## 2025-02-13 ENCOUNTER — TELEMEDICINE (OUTPATIENT)
Dept: NEUROLOGY | Age: 45
End: 2025-02-13
Payer: COMMERCIAL

## 2025-02-13 DIAGNOSIS — G43.109 MIGRAINE WITH AURA AND WITHOUT STATUS MIGRAINOSUS, NOT INTRACTABLE: Primary | ICD-10-CM

## 2025-02-13 PROCEDURE — 99214 OFFICE O/P EST MOD 30 MIN: CPT | Performed by: NURSE PRACTITIONER

## 2025-02-13 RX ORDER — ATOGEPANT 60 MG/1
60 TABLET ORAL DAILY
Qty: 30 TABLET | Refills: 2 | Status: SHIPPED | OUTPATIENT
Start: 2025-02-13 | End: 2025-03-15

## 2025-02-13 RX ORDER — RIMEGEPANT SULFATE 75 MG/75MG
TABLET, ORALLY DISINTEGRATING ORAL
Qty: 9 TABLET | Refills: 11 | Status: SHIPPED | OUTPATIENT
Start: 2025-02-13

## 2025-02-13 ASSESSMENT — PATIENT HEALTH QUESTIONNAIRE - PHQ9
DEPRESSION UNABLE TO ASSESS: FUNCTIONAL CAPACITY MOTIVATION LIMITS ACCURACY
SUM OF ALL RESPONSES TO PHQ QUESTIONS 1-9: 0
2. FEELING DOWN, DEPRESSED OR HOPELESS: NOT AT ALL
1. LITTLE INTEREST OR PLEASURE IN DOING THINGS: NOT AT ALL
SUM OF ALL RESPONSES TO PHQ QUESTIONS 1-9: 0
SUM OF ALL RESPONSES TO PHQ9 QUESTIONS 1 & 2: 0

## 2025-02-13 NOTE — PROGRESS NOTES
Janet Mckinley is a 45 y.o. female  with a history of fibromyalgia, iron deficiency anemia, anxiety, low serum B12, hypothyroidism, obesity who presents with headaches.    Janet Mckinley was evaluated through a synchronous (real-time) audio-video encounter. Patient identification was verified at the start of the visit. She (or guardian if applicable) is aware that this is a billable service, which includes applicable co-pays. This visit was conducted with the patient's (and/or legal guardian's) verbal consent.   The patient was located at Home: [address] [place], [place] [id number].  The provider was located at Other: [place] .     CC: Headache      HPI:        Since last visit,  topamax was increased from 50 to 75mg.  She had trouble tolerating and returned to 50mg after taking for ~ 2 months. She did not notify the office of the difficulty tolerating.. She reports  approx. 1-2 headaches/ week. Using nurtec for rescue. She will occasionally take 800mg ibuprofen. She does report \"world salad\" with topamax. Would like to consider alternative prevention.         She did keep migraine log. She does report times that she does not always intervene initially with nurtec.       Her migraines are located retro-orbital, unilateral, radiating to holocephalic. She describes three different types of headaches. First is \"tension\" located occipital region/neck or a \"fibro\" headache associated with upper neck tension.  They began In her teens.  Duration: hours if untreated.  Migraines intensify over 30-45 minutes. Severity is varies between mild-severe. Quality of headaches are described as throbbing, pulsating.  Associated symptoms: photophobia, phonophobia, nausea, dizziness, osmophobia, will see \"sparkles\"/ or \"squiggles\" on occasion but not always.  The headaches are exacerbated by has not identified .   Factors that relieve the headaches are Lying down in a dark room with a cold cloth, nurtec.   Most bothersome

## 2025-02-13 NOTE — ASSESSMENT & PLAN NOTE
Intolerances to topamax in general but this has worked to reduce some headache burden. Will add qulipta for prevention and then eliminate topamax when I see her back. Continue nurtec for rescue. Take 1 tablet by mouth at onset of migraine, do not take more than 1 tablet in 24 hours.        I encouraged early intervention with migraines as some of the migraines she has had could have been alleviated if she took nurtec instead of NSAIDs

## 2025-02-17 ENCOUNTER — PATIENT MESSAGE (OUTPATIENT)
Age: 45
End: 2025-02-17

## 2025-02-17 DIAGNOSIS — M47.26 OTHER SPONDYLOSIS WITH RADICULOPATHY, LUMBAR REGION: ICD-10-CM

## 2025-02-17 RX ORDER — TIZANIDINE 2 MG/1
2-4 TABLET ORAL EVERY 8 HOURS PRN
Qty: 90 TABLET | Refills: 1 | Status: SHIPPED | OUTPATIENT
Start: 2025-02-17 | End: 2025-03-19

## 2025-02-17 RX ORDER — PREGABALIN 150 MG/1
150 CAPSULE ORAL 3 TIMES DAILY
Qty: 90 CAPSULE | Refills: 2 | Status: CANCELLED | OUTPATIENT
Start: 2025-02-17 | End: 2025-05-18

## 2025-03-06 ENCOUNTER — TELEPHONE (OUTPATIENT)
Dept: NEUROLOGY | Age: 45
End: 2025-03-06

## 2025-03-06 NOTE — TELEPHONE ENCOUNTER
After carefully reviewing the information provided, a [facility] Clinical Pharmacist has determined the request for QULIPTA 60MG Tablet for Janet Mckinley does not meet our criteria for medical necessity.  Our Antimigraine - CGRP Antagonists Medications Criteria is not met. To meet our criteria, your doctor must provide the following information:  Your doctor used guidelines called the International Headache Society Classification of Headache Disorders to confirm your condition  You have had a pregnancy test  You tried and failed one drug from one of the following classes:  Antidepressants (drugs like amitriptyline, venlafaxine)  Beta-blockers (drugs like metoprolol, timolol, atenolol)  Angiotensin-converting enzyme (ACE) inhibitors or Angiotensin II receptor blockers (ARBs) (drugs like lisinopril, candesartan)    [employer], [employer]  [facility]

## 2025-03-10 ENCOUNTER — TELEPHONE (OUTPATIENT)
Dept: NEUROLOGY | Age: 45
End: 2025-03-10

## 2025-03-17 RX ORDER — FOLIC ACID 1 MG/1
TABLET ORAL
Qty: 30 TABLET | Refills: 11 | Status: SHIPPED | OUTPATIENT
Start: 2025-03-17

## 2025-03-21 DIAGNOSIS — M47.26 OTHER SPONDYLOSIS WITH RADICULOPATHY, LUMBAR REGION: ICD-10-CM

## 2025-03-24 RX ORDER — PREGABALIN 150 MG/1
150 CAPSULE ORAL 3 TIMES DAILY
Qty: 90 CAPSULE | Refills: 2 | OUTPATIENT
Start: 2025-03-24 | End: 2025-06-22

## 2025-03-25 RX ORDER — TOPIRAMATE 25 MG/1
75 TABLET, FILM COATED ORAL NIGHTLY
Qty: 270 TABLET | Refills: 2 | Status: SHIPPED | OUTPATIENT
Start: 2025-03-25 | End: 2026-03-25

## 2025-03-31 ENCOUNTER — CLINICAL DOCUMENTATION (OUTPATIENT)
Dept: NEUROLOGY | Age: 45
End: 2025-03-31

## 2025-03-31 DIAGNOSIS — Z32.00 ENCOUNTER FOR PREGNANCY TEST, RESULT UNKNOWN: Primary | ICD-10-CM

## 2025-04-01 NOTE — PROGRESS NOTES
Insurance notes that qulipta is covered if she has tried and failed one drug from one of the following classes: antidepressants. She has failed an SNRI and therefore other drugs like SSRI or TCAs are contraindicated and therefore cannot be taken together. A pregnancy test is ordered. Otherwise, this should suffice criteria for coverage.

## 2025-04-03 NOTE — PROGRESS NOTES
Ms. Mckinley is a follow-up for chronic lumbar radiculopathy.  She is a surgical candidate but must quit smoking prior to being considered for surgery.  She also suffers from fibromyalgia for which she is treated with Lyrica and Cymbalta.

## 2025-04-04 ENCOUNTER — OFFICE VISIT (OUTPATIENT)
Age: 45
End: 2025-04-04
Payer: COMMERCIAL

## 2025-04-04 DIAGNOSIS — M47.26 OTHER SPONDYLOSIS WITH RADICULOPATHY, LUMBAR REGION: ICD-10-CM

## 2025-04-04 DIAGNOSIS — M79.7 FIBROMYALGIA: ICD-10-CM

## 2025-04-04 PROCEDURE — 99214 OFFICE O/P EST MOD 30 MIN: CPT | Performed by: PHYSICIAN ASSISTANT

## 2025-04-04 RX ORDER — PREGABALIN 150 MG/1
150 CAPSULE ORAL 3 TIMES DAILY
Qty: 90 CAPSULE | Refills: 2 | Status: SHIPPED | OUTPATIENT
Start: 2025-04-04 | End: 2025-07-03

## 2025-04-04 RX ORDER — DULOXETIN HYDROCHLORIDE 60 MG/1
CAPSULE, DELAYED RELEASE ORAL
Qty: 30 CAPSULE | Refills: 2 | Status: SHIPPED | OUTPATIENT
Start: 2025-04-04

## 2025-04-04 ASSESSMENT — ENCOUNTER SYMPTOMS
ALLERGIC/IMMUNOLOGIC NEGATIVE: 1
EYES NEGATIVE: 1
ABDOMINAL PAIN: 0
SHORTNESS OF BREATH: 0

## 2025-04-04 NOTE — PROGRESS NOTES
Chronic Pain Consult Note      Plan:     A comprehensive pain management plan may consist of the following: Testing, Therapy, Medications, Interventions, Consults, and Follow up.    Lumbar radiculopathy  S/p left L4-5 and L5-S1 T RONALD with 0% pain relief, therefore no repeat.  S/p repeat lumbar MRI with worsening of L sided L5/S1 stenosis. She has met with surgery who required PT along with smoking cessation.    PT is not helping her LLE symptoms.   Gluteal muscle strain, R side  D/c Diclofenac due to ineffectiveness   D/c Robaxin - caused hot flashes   Xray R hip reviewed and negative for OA  Hip has improved.    Continue prn Tizanidine. She has plenty of this left but would like a refill on file at the pharmacy.    Lumbar degenerative disc disease  Plan as above  Fibromyalgia  Encourage continuation active lifestyle  Spent considerable time discussing fibromyalgia condition and treatment  Continue Lyrica and Cymbalta as prescribed.   Patient currently on optimal medication regimen including Lyrica, Celexa, Cymbalta  Tobacco Abuse  I am also willing to write her Nicotine lollipops through [facility] if she would like to use those to help stop smoking. She would need 16mg nicotine lollipops to start with based on current smoking patterns. She will call when she is ready to start those. She would like to follow up with Dr Suarez to find out a plan for what surgery would entail.   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 She has been out of work for several years. She was unable to keep her attendance due to her FMS flares. She does also have a chronic L5/S1 radiculopathy but must quit smoking in order to qualify for surgery which is a surgical issue.     General Recommendations: The pain condition that the patient suffers from is best treated with a multidisciplinary approach that involves an increase in physical activity to prevent de-conditioning and

## 2025-04-11 ENCOUNTER — OFFICE VISIT (OUTPATIENT)
Age: 45
End: 2025-04-11
Payer: COMMERCIAL

## 2025-04-11 VITALS — BODY MASS INDEX: 43.37 KG/M2 | WEIGHT: 260.3 LBS | HEIGHT: 65 IN

## 2025-04-11 DIAGNOSIS — M54.16 LUMBAR RADICULOPATHY: Primary | ICD-10-CM

## 2025-04-11 DIAGNOSIS — M51.369 DEGENERATION OF INTERVERTEBRAL DISC OF LUMBAR REGION, UNSPECIFIED WHETHER PAIN PRESENT: ICD-10-CM

## 2025-04-11 DIAGNOSIS — M43.16 SPONDYLOLISTHESIS, LUMBAR REGION: ICD-10-CM

## 2025-04-11 PROCEDURE — 99213 OFFICE O/P EST LOW 20 MIN: CPT | Performed by: ORTHOPAEDIC SURGERY

## 2025-04-11 NOTE — PROGRESS NOTES
Name: Janet Mckinley  YOB: 1980  Gender: female  MRN: [id number]  Age: 45 y.o.      Chief Complaint: Back pain and bilateral leg pain    History of Present Illness:      This is a very pleasant 45 y.o. female who presents with a 2-year history of back pain and bilateral leg pain.  The left leg is affected more than right leg.  She states the pain radiates down the posterior lateral aspect of her spine laterally.  She has tried multiple injections without sustained relief.  She has done physical therapy without much relief.  She has taken a variety of different medications to help with this without significant relief.  Her pain is 7/10 in severity and affects her quality of life and her ability to perform her activities of daily living.    Medications:       Current Outpatient Medications:     DULoxetine (CYMBALTA) 60 MG extended release capsule, TAKE 1 CAPSULE BY MOUTH ONCE DAILY, Disp: 30 capsule, Rfl: 2    pregabalin (LYRICA) 150 MG capsule, Take 1 capsule by mouth in the morning, at noon, and at bedtime for 90 days. Max Daily Amount: 450 mg, Disp: 90 capsule, Rfl: 2    topiramate (TOPAMAX) 25 MG tablet, Take 3 tablets by mouth at bedtime, Disp: 270 tablet, Rfl: 2    folic acid (FOLVITE) 1 MG tablet, TAKE 1 TABLET BY MOUTH ONCE DAILY, Disp: 30 tablet, Rfl: 11    rimegepant sulfate (NURTEC) 75 MG TBDP, Take 1 tablet orally at onset of migraine, do not take more than 1 in 24 hours., Disp: 9 tablet, Rfl: 11    Syringe, Disposable, 1 ML MISC, To be used with monthly vitamin b12 shots, Disp: 12 each, Rfl: 1    citalopram (CELEXA) 40 MG tablet, TAKE 1 TABLET BY MOUTH ONCE DAILY, Disp: 90 tablet, Rfl: 1    cyanocobalamin 1000 MCG/ML injection, Administer every 28 days., Disp: 1 mL, Rfl: 5    levothyroxine (SYNTHROID) 88 MCG tablet, TAKE 1 TABLET BY MOUTH ONCE DAILY BEFORE BREAKFAST, Disp: 90 tablet, Rfl: 1    valACYclovir (VALTREX) 500 MG tablet, Take 1 tablet by mouth 2 times daily, Disp: 90

## 2025-04-24 ENCOUNTER — HOSPITAL ENCOUNTER (OUTPATIENT)
Dept: MRI IMAGING | Age: 45
Discharge: HOME | End: 2025-04-27
Attending: ORTHOPAEDIC SURGERY
Payer: COMMERCIAL

## 2025-04-24 DIAGNOSIS — M54.16 LUMBAR RADICULOPATHY: ICD-10-CM

## 2025-04-24 DIAGNOSIS — M51.369 DEGENERATION OF INTERVERTEBRAL DISC OF LUMBAR REGION, UNSPECIFIED WHETHER PAIN PRESENT: ICD-10-CM

## 2025-04-24 DIAGNOSIS — M43.16 SPONDYLOLISTHESIS, LUMBAR REGION: ICD-10-CM

## 2025-04-24 PROCEDURE — 72148 MRI LUMBAR SPINE W/O DYE: CPT

## 2025-05-04 DIAGNOSIS — M79.7 FIBROMYALGIA: ICD-10-CM

## 2025-05-05 RX ORDER — DULOXETIN HYDROCHLORIDE 60 MG/1
CAPSULE, DELAYED RELEASE ORAL
Qty: 30 CAPSULE | Refills: 2 | OUTPATIENT
Start: 2025-05-05

## 2025-05-05 RX ORDER — LEVOTHYROXINE SODIUM 88 UG/1
TABLET ORAL
Qty: 90 TABLET | Refills: 1 | OUTPATIENT
Start: 2025-05-05

## 2025-05-13 ENCOUNTER — OFFICE VISIT (OUTPATIENT)
Dept: NEUROLOGY | Age: 45
End: 2025-05-13
Payer: COMMERCIAL

## 2025-05-13 ENCOUNTER — TELEPHONE (OUTPATIENT)
Dept: NEUROLOGY | Age: 45
End: 2025-05-13

## 2025-05-13 VITALS
BODY MASS INDEX: 44.29 KG/M2 | HEART RATE: 64 BPM | WEIGHT: 258 LBS | SYSTOLIC BLOOD PRESSURE: 113 MMHG | DIASTOLIC BLOOD PRESSURE: 79 MMHG | OXYGEN SATURATION: 97 %

## 2025-05-13 DIAGNOSIS — G43.109 MIGRAINE WITH AURA AND WITHOUT STATUS MIGRAINOSUS, NOT INTRACTABLE: Primary | ICD-10-CM

## 2025-05-13 PROCEDURE — 99214 OFFICE O/P EST MOD 30 MIN: CPT | Performed by: NURSE PRACTITIONER

## 2025-05-13 RX ORDER — RIMEGEPANT SULFATE 75 MG/75MG
TABLET, ORALLY DISINTEGRATING ORAL
Qty: 9 TABLET | Refills: 11 | Status: SHIPPED | OUTPATIENT
Start: 2025-05-13

## 2025-05-13 RX ORDER — ATOGEPANT 60 MG/1
TABLET ORAL
Qty: 30 TABLET | Refills: 11 | Status: SHIPPED | OUTPATIENT
Start: 2025-05-13

## 2025-05-13 ASSESSMENT — ENCOUNTER SYMPTOMS
NAUSEA: 1
EYE REDNESS: 0
EYE PAIN: 0
PHOTOPHOBIA: 1
ALLERGIC/IMMUNOLOGIC NEGATIVE: 1
VOMITING: 1

## 2025-05-13 NOTE — PROGRESS NOTES
Janet Mckinley is a 45 y.o. female  with a history of fibromyalgia, iron deficiency anemia, anxiety, low serum B12, hypothyroidism, obesity who presents with headaches.      CC: Headache        History of Present Illness  The patient presents for evaluation of headaches.    Over the past week, she has experienced a significant number of headaches, with 3 to 4 episodes occurring upon awakening, one of which was a migraine. For the past 1 to 1.5 months, she has not been on any prophylactic treatment for her headaches. She has not yet started Qulipta due to insurance issues. She continues to use Nurtec as a rescue medication, which she reports as effective.       SOCIAL HISTORY  Sleep: Wakes up with headaches three or four mornings a week, one of which is a migraine.           Her migraines are located retro-orbital, unilateral, radiating to holocephalic. She describes three different types of headaches. First is \"tension\" located occipital region/neck or a \"fibro\" headache associated with upper neck tension.  They began In her teens.  Duration: hours if untreated.  Migraines intensify over 30-45 minutes. Severity is varies between mild-severe. Quality of headaches are described as throbbing, pulsating.  Associated symptoms: photophobia, phonophobia, nausea, dizziness, osmophobia, will see \"sparkles\"/ or \"squiggles\" on occasion but not always.  The headaches are exacerbated by has not identified .   Factors that relieve the headaches are Lying down in a dark room with a cold cloth, nurtec.   Most bothersome migraine symptoms Pain. Previous Imaging Yes, as below.       Migraines worsened in frequency and severity which prompted her initial work up with our office in 2019. She was prescribed Aimovig 70mg, took for 1 month, but noted 5 days after the injection that she experienced worsening severity of her migraines. Denies allergic reaction to Aimovig.       She has been tested for NU, negative sleep study per

## 2025-05-13 NOTE — ASSESSMENT & PLAN NOTE
Patient with trial and failure, multiple first-line preventative therapies. Agree with continuing off topamax due to intolerances. Will start Qulipta 60mg. We did call her insurance company while she was in office today and they should have a decision by tomorrow. Nurtec is approved. Continue nurtec for rescue. Take 1 tablet by mouth at onset of migraine, do not take more than 1 tablet in 24 hours.      Continue with early migraine intervention

## 2025-05-20 ENCOUNTER — OFFICE VISIT (OUTPATIENT)
Dept: ONCOLOGY | Age: 45
End: 2025-05-20

## 2025-05-20 ENCOUNTER — HOSPITAL ENCOUNTER (OUTPATIENT)
Dept: LAB | Age: 45
Discharge: HOME | End: 2025-05-20
Payer: COMMERCIAL

## 2025-05-20 VITALS
HEART RATE: 77 BPM | HEIGHT: 64 IN | BODY MASS INDEX: 43.87 KG/M2 | OXYGEN SATURATION: 99 % | TEMPERATURE: 98.5 F | RESPIRATION RATE: 17 BRPM | WEIGHT: 257 LBS | DIASTOLIC BLOOD PRESSURE: 72 MMHG | SYSTOLIC BLOOD PRESSURE: 103 MMHG

## 2025-05-20 DIAGNOSIS — Z86.39 HISTORY OF FOLIC ACID DEFICIENCY (NON-ANEMIC): ICD-10-CM

## 2025-05-20 DIAGNOSIS — E53.8 VITAMIN B12 DEFICIENCY: ICD-10-CM

## 2025-05-20 DIAGNOSIS — Z86.2 HISTORY OF IRON DEFICIENCY ANEMIA: ICD-10-CM

## 2025-05-20 DIAGNOSIS — Z86.2 HISTORY OF ANEMIA DUE TO VITAMIN B12 DEFICIENCY: ICD-10-CM

## 2025-05-20 DIAGNOSIS — M79.7 FIBROMYALGIA: ICD-10-CM

## 2025-05-20 DIAGNOSIS — E53.8 FOLIC ACID DEFICIENCY: ICD-10-CM

## 2025-05-20 DIAGNOSIS — D50.9 IRON DEFICIENCY ANEMIA, UNSPECIFIED IRON DEFICIENCY ANEMIA TYPE: Primary | ICD-10-CM

## 2025-05-20 LAB
ALBUMIN SERPL-MCNC: 3.4 G/DL (ref 3.5–5)
ALBUMIN/GLOB SERPL: 1 (ref 1–1.9)
ALP SERPL-CCNC: 68 U/L (ref 35–104)
ALT SERPL-CCNC: 9 U/L (ref 8–45)
ANION GAP SERPL CALC-SCNC: 11 MMOL/L (ref 7–16)
AST SERPL-CCNC: 17 U/L (ref 15–37)
BASOPHILS # BLD: 0.03 K/UL (ref 0–0.2)
BASOPHILS NFR BLD: 0.4 % (ref 0–2)
BILIRUB SERPL-MCNC: 0.6 MG/DL (ref 0–1.2)
BUN SERPL-MCNC: 11 MG/DL (ref 6–23)
CALCIUM SERPL-MCNC: 9.3 MG/DL (ref 8.8–10.2)
CHLORIDE SERPL-SCNC: 106 MMOL/L (ref 98–107)
CO2 SERPL-SCNC: 23 MMOL/L (ref 20–29)
CREAT SERPL-MCNC: 0.78 MG/DL (ref 0.6–1.1)
DIFFERENTIAL METHOD BLD: NORMAL
EOSINOPHIL # BLD: 0.12 K/UL (ref 0–0.8)
EOSINOPHIL NFR BLD: 1.7 % (ref 0.5–7.8)
ERYTHROCYTE [DISTWIDTH] IN BLOOD BY AUTOMATED COUNT: 13.9 % (ref 11.9–14.6)
FERRITIN SERPL-MCNC: 244 NG/ML (ref 8–388)
FOLATE SERPL-MCNC: 22.8 NG/ML (ref 3.1–17.5)
GLOBULIN SER CALC-MCNC: 3.5 G/DL (ref 2.3–3.5)
GLUCOSE SERPL-MCNC: 99 MG/DL (ref 70–99)
HCT VFR BLD AUTO: 41.3 % (ref 35.8–46.3)
HGB BLD-MCNC: 13.5 G/DL (ref 11.7–15.4)
IMM GRANULOCYTES # BLD AUTO: 0.01 K/UL (ref 0–0.5)
IMM GRANULOCYTES NFR BLD AUTO: 0.1 % (ref 0–5)
IRON SATN MFR SERPL: 35 % (ref 20–50)
IRON SERPL-MCNC: 81 UG/DL (ref 35–100)
LYMPHOCYTES # BLD: 1.49 K/UL (ref 0.5–4.6)
LYMPHOCYTES NFR BLD: 21.2 % (ref 13–44)
MCH RBC QN AUTO: 29.9 PG (ref 26.1–32.9)
MCHC RBC AUTO-ENTMCNC: 32.7 G/DL (ref 31.4–35)
MCV RBC AUTO: 91.4 FL (ref 82–102)
MONOCYTES # BLD: 0.55 K/UL (ref 0.1–1.3)
MONOCYTES NFR BLD: 7.8 % (ref 4–12)
NEUTS SEG # BLD: 4.82 K/UL (ref 1.7–8.2)
NEUTS SEG NFR BLD: 68.8 % (ref 43–78)
NRBC # BLD: 0 K/UL (ref 0–0.2)
PLATELET # BLD AUTO: 194 K/UL (ref 150–450)
PMV BLD AUTO: 11.8 FL (ref 9.4–12.3)
POTASSIUM SERPL-SCNC: 4.2 MMOL/L (ref 3.5–5.1)
PROT SERPL-MCNC: 6.9 G/DL (ref 6.3–8.2)
RBC # BLD AUTO: 4.52 M/UL (ref 4.05–5.2)
SODIUM SERPL-SCNC: 140 MMOL/L (ref 136–145)
TIBC SERPL-MCNC: 232 UG/DL (ref 240–450)
UIBC SERPL-MCNC: 151 UG/DL (ref 112–347)
WBC # BLD AUTO: 7 K/UL (ref 4.3–11.1)

## 2025-05-20 PROCEDURE — 83550 IRON BINDING TEST: CPT

## 2025-05-20 PROCEDURE — 36415 COLL VENOUS BLD VENIPUNCTURE: CPT

## 2025-05-20 PROCEDURE — 85025 COMPLETE CBC W/AUTO DIFF WBC: CPT

## 2025-05-20 PROCEDURE — 82746 ASSAY OF FOLIC ACID SERUM: CPT

## 2025-05-20 PROCEDURE — 83540 ASSAY OF IRON: CPT

## 2025-05-20 PROCEDURE — 80053 COMPREHEN METABOLIC PANEL: CPT

## 2025-05-20 PROCEDURE — 82728 ASSAY OF FERRITIN: CPT

## 2025-05-20 RX ORDER — CYANOCOBALAMIN 1000 UG/ML
INJECTION, SOLUTION INTRAMUSCULAR; SUBCUTANEOUS
Qty: 3 EACH | Refills: 3 | Status: SHIPPED | OUTPATIENT
Start: 2025-05-20

## 2025-05-20 ASSESSMENT — PATIENT HEALTH QUESTIONNAIRE - PHQ9
SUM OF ALL RESPONSES TO PHQ QUESTIONS 1-9: 0
2. FEELING DOWN, DEPRESSED OR HOPELESS: NOT AT ALL
1. LITTLE INTEREST OR PLEASURE IN DOING THINGS: NOT AT ALL
SUM OF ALL RESPONSES TO PHQ QUESTIONS 1-9: 0

## 2025-05-20 NOTE — PATIENT INSTRUCTIONS
scheduled appointment time.If you have any questions regarding your upcoming schedule please reach out to your care team through [facility] or call [phone].     Please notify your assigned Nurse Navigator of any unplanned hospital admissions or Emergency Room visits within 24 hours of discharge.    -------------------------------------------------------------------------------------------------------------------  Please call our office at [phone] if you have any  of the following symptoms:   Fever of 100.5 or greater  Chills  Shortness of breath  Swelling or pain in one leg    After office hours an answering service is available and will contact a provider for emergencies or if you are experiencing any of the above symptoms.        Jessenia Velasco, RN

## 2025-05-20 NOTE — PROGRESS NOTES
Data Source: Patient, [facility] record.    5/20/2025    11:48 AM    Janet Mckinley [id number]    45 y.o.      Patient Encounter: [facility] Visit    Heme Diagnosis:  Anemia  Heme History (Copied from prior):   6/15/2021: Seen for her initial office visit with me after graduating from [facility] program under Dr. Medel care. Reports doing well today.  Denies any overt bleeding.  Ongoing fatigue, as well as some numbness of her hands and feet.  Recent bilateral mammogram and left sided ultrasound 4/21 with probable intramammary lymph node, and recommendation for quick interval left breast ultrasound in 6 months (10/21).  She has essentially held off on most of her care while awaiting Medicaid approval.  Lab work today with out anemia however low iron stores: For now she would prefer to hold off on IV iron till Medicaid kicks in and this is reasonable.  She has yet to see GI in relation to this also.  Also wants to see neurology down the line.  Interval History:  5/20/2025: Patient reports doing well.  Denies any bleeding.  Regular with her prescription vitamin B12 injections and folic acid.  Blood work today without anemia, iron stores pending however stable in past.   REVIEW OF SYSTEMS:  As mentioned above, all other systems were reviewed in full and are negative.    Past Medical History:   Diagnosis Date    Anemia     Anxiety     medication    COVID-19 2021    Fibromyalgia     managed with medication    GERD (gastroesophageal reflux disease) 3/25/2014    H/O seasonal allergies     HSV (herpes simplex virus) infection     Hypothyroidism 3/25/2014    managed with medication    Migraine 3/25/2014    managed with medication    Morbid obesity (HCC) 3/25/2014    Oligomenorrhea 3/25/2014    Ovarian cyst 3/25/2014    PCOS (polycystic ovarian syndrome)     managed with metformin    Trichomonosis     Vitamin D deficiency     managed with medication       Past Surgical History:   Procedure

## 2025-07-03 ENCOUNTER — OFFICE VISIT (OUTPATIENT)
Age: 45
End: 2025-07-03
Payer: COMMERCIAL

## 2025-07-03 DIAGNOSIS — M47.26 OTHER SPONDYLOSIS WITH RADICULOPATHY, LUMBAR REGION: ICD-10-CM

## 2025-07-03 DIAGNOSIS — M79.7 FIBROMYALGIA: ICD-10-CM

## 2025-07-03 PROCEDURE — 99214 OFFICE O/P EST MOD 30 MIN: CPT | Performed by: PHYSICIAN ASSISTANT

## 2025-07-03 RX ORDER — PREGABALIN 150 MG/1
150 CAPSULE ORAL 3 TIMES DAILY
Qty: 90 CAPSULE | Refills: 2 | Status: SHIPPED | OUTPATIENT
Start: 2025-07-03 | End: 2025-10-01

## 2025-07-03 RX ORDER — DULOXETIN HYDROCHLORIDE 60 MG/1
CAPSULE, DELAYED RELEASE ORAL
Qty: 30 CAPSULE | Refills: 2 | Status: SHIPPED | OUTPATIENT
Start: 2025-07-03

## 2025-07-03 ASSESSMENT — ENCOUNTER SYMPTOMS
ABDOMINAL PAIN: 0
SHORTNESS OF BREATH: 0
ALLERGIC/IMMUNOLOGIC NEGATIVE: 1
EYES NEGATIVE: 1

## 2025-07-03 NOTE — PROGRESS NOTES
Janet is a 3-month follow-up for chronic lumbar radiculopathy as well as fibromyalgia.  She is a surgical candidate for her lumbar radiculopathy but has been working on smoking cessation.

## 2025-07-03 NOTE — PROGRESS NOTES
Chronic Pain Consult Note      Plan:     A comprehensive pain management plan may consist of the following: Testing, Therapy, Medications, Interventions, Consults, and Follow up.    Lumbar radiculopathy  S/p left L4-5 and L5-S1 T RONALD with 0% pain relief, therefore no repeat.  S/p repeat lumbar MRI with worsening of L sided L5/S1 stenosis. She has met with surgery who required PT along with smoking cessation.    PT is not helping her LLE symptoms.   Gluteal muscle strain, R side  D/c Diclofenac due to ineffectiveness   D/c Robaxin - caused hot flashes   Xray R hip reviewed and negative for OA  Hip has improved.    Continue prn Tizanidine. She has plenty of this left but would like a refill on file at the pharmacy.    Lumbar degenerative disc disease  Plan as above  Fibromyalgia  Encourage continuation active lifestyle  Spent considerable time discussing fibromyalgia condition and treatment  Refill Lyrica and Cymbalta 60mg QD.   Patient currently on optimal medication regimen including Lyrica, Celexa, Cymbalta  Tobacco Abuse  Working on cessation with nicotine lozenges.   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 She has been out of work for several years. She was unable to keep her attendance due to her FMS flares. She does also have a chronic L5/S1 radiculopathy but must quit smoking in order to qualify for surgery which is a surgical issue.     General Recommendations: The pain condition that the patient suffers from is best treated with a multidisciplinary approach that involves an increase in physical activity to prevent de-conditioning and worsening of the pain cycle, as well as psychological counseling (formal and/or informal) to address the co morbid psychological effects of pain. Treatment will often involve judicious use of pain medications and interventional procedures to decrease the pain, allowing the patient to participate in the physical activity

## 2025-07-10 ENCOUNTER — TELEPHONE (OUTPATIENT)
Dept: FAMILY MEDICINE CLINIC | Facility: CLINIC | Age: 45
End: 2025-07-10

## 2025-07-10 DIAGNOSIS — E03.8 OTHER SPECIFIED HYPOTHYROIDISM: Primary | ICD-10-CM

## 2025-07-10 RX ORDER — LEVOTHYROXINE SODIUM 88 UG/1
TABLET ORAL
Qty: 90 TABLET | Refills: 1 | Status: SHIPPED | OUTPATIENT
Start: 2025-07-10

## 2025-07-10 NOTE — TELEPHONE ENCOUNTER
Patient called requesting a refill for:     (BRIDGE)    Name of Medication(s) :     levothyroxine (SYNTHROID) 88 MCG tablet     Preferred Pharmacy:   [facility] - [address] [phone] - F [phone]      LOV: 10/30/2024    NOV: 7/16/2025        Additional  Info:  Patient was advised that Dr Miguel was out of office, states that she has been out since 7/8 and needs a refill before PCP returns. Appt schedule for 7/16       Please advise

## 2025-07-11 ENCOUNTER — TELEPHONE (OUTPATIENT)
Dept: FAMILY MEDICINE CLINIC | Facility: CLINIC | Age: 45
End: 2025-07-11

## 2025-07-11 NOTE — TELEPHONE ENCOUNTER
----- Message from Donald RODRIGUEZ sent at 7/10/2025  3:31 PM EDT -----  Regarding: [facility] Appointment Request  [facility] Appointment Request    Patient needs appointment for [facility] Appointment Type: New Patient.    Patient Requested Dates(s):soonest  Patient Requested Time:anytime  Provider Name:any    Reason for Appointment Request: New Patient - No appointments available during search  --------------------------------------------------------------------------------------------------------------------------    Relationship to Patient: Self     Call Back Information: OK to leave message on voicemail  Preferred Call Back Number: Phone [phone]

## 2025-07-21 ENCOUNTER — TELEPHONE (OUTPATIENT)
Dept: FAMILY MEDICINE CLINIC | Facility: CLINIC | Age: 45
End: 2025-07-21

## 2025-07-21 NOTE — TELEPHONE ENCOUNTER
----- Message from Lita ELIZABETH sent at 7/21/2025  1:42 PM EDT -----  Regarding: [facility] Appointment Request  [facility] Appointment Request    Patient needs appointment for [facility] Appointment Type: New Patient.    Patient Requested Dates(s): any day   Patient Requested Time: between 10AM or 4PM   Provider Name:Tereza Murray DO or Tereza Murray DO    Reason for Appointment Request: New Patient - Requested Provider unavailable, pt would like to get establish care with the said provider including the time and date above.     --------------------------------------------------------------------------------------------------------------------------    Relationship to Patient: Self     Call Back Information: OK to leave message on voicemail  Preferred Call Back Number: Phone [phone]

## 2025-07-22 ENCOUNTER — PATIENT MESSAGE (OUTPATIENT)
Age: 45
End: 2025-07-22

## 2025-07-23 ENCOUNTER — TELEMEDICINE (OUTPATIENT)
Dept: FAMILY MEDICINE CLINIC | Facility: CLINIC | Age: 45
End: 2025-07-23
Payer: COMMERCIAL

## 2025-07-23 DIAGNOSIS — F41.9 ANXIETY DISORDER, UNSPECIFIED TYPE: Primary | ICD-10-CM

## 2025-07-23 PROCEDURE — 99213 OFFICE O/P EST LOW 20 MIN: CPT | Performed by: FAMILY MEDICINE

## 2025-07-23 RX ORDER — CITALOPRAM HYDROBROMIDE 40 MG/1
TABLET ORAL
Qty: 90 TABLET | Refills: 1 | Status: SHIPPED | OUTPATIENT
Start: 2025-07-23

## 2025-07-23 NOTE — PROGRESS NOTES
Sclera  [x]  Normal    [] Abnormal -          Discharge [x]  None visible      HENT: [x] Normocephalic, atraumatic    [x] Mouth/Throat: Mucous membranes are moist    External Ears [x] Normal  [] Abnormal -                        Hearing is normal    Neck: [x] No visualized mass [] Abnormal -     Pulmonary/Chest: [x] Respiratory effort normal   [x] No visualized signs of difficulty breathing or respiratory distress         Musculoskeletal:   [x] Normal gait with no signs of ataxia         [x] Normal range of motion of neck            Neurological:        [x] No Facial Asymmetry (Cranial nerve 7 motor function) (limited exam due to video visit)          [x] No gaze palsy                Skin:        [x] No significant exanthematous lesions or discoloration noted on facial skin                  Psychiatric:       [x] Normal Affect        [x] No Hallucinations  Assessment:  1. Anxiety disorder, unspecified type       Plan:   Other medical questions have been addressed/discussed as needed.  Medications adjusted as needed.  1. Anxiety disorder, unspecified type  -     citalopram (CELEXA) 40 MG tablet; TAKE 1 TABLET BY MOUTH ONCE DAILY, Disp-90 tablet, R-1Normal    Reminded her to schedule lab late summer early fall for overdue labs    Followup:  According to instructions given today  No follow-ups on file.

## (undated) DEVICE — GAUZE,SPONGE,4"X4",12PLY,WOVEN,NS,LF: Brand: MEDLINE

## (undated) DEVICE — SINGLE PORT MANIFOLD: Brand: NEPTUNE 2

## (undated) DEVICE — BLOCK BITE AD 60FR W/ VELC STRP ADDRESSES MOST PT AND

## (undated) DEVICE — SYRINGE, LUER SLIP, STERILE, 60ML: Brand: MEDLINE

## (undated) DEVICE — KENDALL RADIOLUCENT FOAM MONITORING ELECTRODE RECTANGULAR SHAPE: Brand: KENDALL

## (undated) DEVICE — AIRLIFE™ OXYGEN TUBING 7 FEET (2.1 M) CRUSH RESISTANT OXYGEN TUBING, VINYL TIPPED: Brand: AIRLIFE™

## (undated) DEVICE — NEEDLE SYR 18GA L1.5IN RED PLAS HUB S STL BLNT FILL W/O

## (undated) DEVICE — SYRINGE MED 3ML CLR PLAS STD N CTRL LUERLOCK TIP DISP

## (undated) DEVICE — YANKAUER,BULB TIP,W/O VENT,RIGID,STERILE: Brand: MEDLINE

## (undated) DEVICE — THE TORRENT IRRIGATION TUBING IS INTENDED TO PROVIDE IRRIGATION VIA IRRIGATION FLUIDS, SUCH AS STERILE WATER, DURING GASTROINTESTINAL ENDOSCOPIC PROCEDURES WHEN USED IN CONJUNCTION WITH AN IRRIGATION PUMP OR ELECTROSURGICAL UNIT.: Brand: TORRENT

## (undated) DEVICE — LUBE JELLY FOIL PACK 1.4 OZ: Brand: MEDLINE INDUSTRIES, INC.

## (undated) DEVICE — CONNECTOR TBNG OD5-7MM O2 END DISP

## (undated) DEVICE — CANNULA NSL ORAL AD FOR CAPNOFLEX CO2 O2 AIRLFE

## (undated) DEVICE — SYRINGE MED 10ML LUERLOCK TIP W/O SFTY DISP